# Patient Record
Sex: MALE | Race: WHITE | NOT HISPANIC OR LATINO | Employment: OTHER | URBAN - METROPOLITAN AREA
[De-identification: names, ages, dates, MRNs, and addresses within clinical notes are randomized per-mention and may not be internally consistent; named-entity substitution may affect disease eponyms.]

---

## 2017-02-04 ENCOUNTER — HOSPITAL ENCOUNTER (EMERGENCY)
Facility: HOSPITAL | Age: 47
Discharge: HOME | End: 2017-02-04
Attending: EMERGENCY MEDICINE
Payer: MEDICARE

## 2017-02-04 VITALS
SYSTOLIC BLOOD PRESSURE: 145 MMHG | BODY MASS INDEX: 23.91 KG/M2 | HEART RATE: 97 BPM | HEIGHT: 70 IN | WEIGHT: 167 LBS | TEMPERATURE: 99 F | OXYGEN SATURATION: 99 % | RESPIRATION RATE: 18 BRPM | DIASTOLIC BLOOD PRESSURE: 84 MMHG

## 2017-02-04 DIAGNOSIS — R06.02 SHORTNESS OF BREATH: Primary | ICD-10-CM

## 2017-02-04 DIAGNOSIS — R05.9 COUGH: ICD-10-CM

## 2017-02-04 LAB
ALBUMIN SERPL BCP-MCNC: 4.3 G/DL
ALP SERPL-CCNC: 92 U/L
ALT SERPL W/O P-5'-P-CCNC: 56 U/L
ANION GAP SERPL CALC-SCNC: 11 MMOL/L
AST SERPL-CCNC: 37 U/L
BASOPHILS # BLD AUTO: 0.03 K/UL
BASOPHILS NFR BLD: 0.3 %
BILIRUB SERPL-MCNC: 0.5 MG/DL
BILIRUB UR QL STRIP: NEGATIVE
BUN SERPL-MCNC: 14 MG/DL
CALCIUM SERPL-MCNC: 9.7 MG/DL
CHLORIDE SERPL-SCNC: 106 MMOL/L
CLARITY UR REFRACT.AUTO: CLEAR
CO2 SERPL-SCNC: 21 MMOL/L
COLOR UR AUTO: YELLOW
CREAT SERPL-MCNC: 0.9 MG/DL
DIFFERENTIAL METHOD: ABNORMAL
EOSINOPHIL # BLD AUTO: 0.3 K/UL
EOSINOPHIL NFR BLD: 3.1 %
ERYTHROCYTE [DISTWIDTH] IN BLOOD BY AUTOMATED COUNT: 13 %
EST. GFR  (AFRICAN AMERICAN): >60 ML/MIN/1.73 M^2
EST. GFR  (NON AFRICAN AMERICAN): >60 ML/MIN/1.73 M^2
GLUCOSE SERPL-MCNC: 154 MG/DL
GLUCOSE UR QL STRIP: NEGATIVE
HCT VFR BLD AUTO: 42 %
HGB BLD-MCNC: 13.6 G/DL
HGB UR QL STRIP: NEGATIVE
KETONES UR QL STRIP: NEGATIVE
LEUKOCYTE ESTERASE UR QL STRIP: NEGATIVE
LIPASE SERPL-CCNC: 64 U/L
LYMPHOCYTES # BLD AUTO: 1.4 K/UL
LYMPHOCYTES NFR BLD: 14.9 %
MCH RBC QN AUTO: 29.6 PG
MCHC RBC AUTO-ENTMCNC: 32.4 %
MCV RBC AUTO: 91 FL
MONOCYTES # BLD AUTO: 0.5 K/UL
MONOCYTES NFR BLD: 5.7 %
NEUTROPHILS # BLD AUTO: 6.9 K/UL
NEUTROPHILS NFR BLD: 75.7 %
NITRITE UR QL STRIP: NEGATIVE
PH UR STRIP: 5 [PH] (ref 5–8)
PLATELET # BLD AUTO: 257 K/UL
PMV BLD AUTO: 10.7 FL
POTASSIUM SERPL-SCNC: 4.6 MMOL/L
PROT SERPL-MCNC: 8.1 G/DL
PROT UR QL STRIP: NEGATIVE
RBC # BLD AUTO: 4.6 M/UL
SODIUM SERPL-SCNC: 138 MMOL/L
SP GR UR STRIP: 1.01 (ref 1–1.03)
URN SPEC COLLECT METH UR: NORMAL
UROBILINOGEN UR STRIP-ACNC: NEGATIVE EU/DL
WBC # BLD AUTO: 9.07 K/UL

## 2017-02-04 PROCEDURE — 80053 COMPREHEN METABOLIC PANEL: CPT

## 2017-02-04 PROCEDURE — 93010 ELECTROCARDIOGRAM REPORT: CPT | Mod: ,,, | Performed by: INTERNAL MEDICINE

## 2017-02-04 PROCEDURE — 85025 COMPLETE CBC W/AUTO DIFF WBC: CPT

## 2017-02-04 PROCEDURE — 99284 EMERGENCY DEPT VISIT MOD MDM: CPT | Mod: 25

## 2017-02-04 PROCEDURE — 93005 ELECTROCARDIOGRAM TRACING: CPT

## 2017-02-04 PROCEDURE — 81003 URINALYSIS AUTO W/O SCOPE: CPT

## 2017-02-04 PROCEDURE — 99285 EMERGENCY DEPT VISIT HI MDM: CPT | Mod: GC,,, | Performed by: EMERGENCY MEDICINE

## 2017-02-04 PROCEDURE — 83690 ASSAY OF LIPASE: CPT

## 2017-02-04 NOTE — ED TRIAGE NOTES
Pt to ed from [facility] with caretaker who states she noted sob and pt holding l side, pt is non-verbal @ baseline

## 2017-02-04 NOTE — ED AVS SNAPSHOT
[facility]  [address] [address]               Bernard Olvera   2017  9:59 AM   ED    Description:  Male : 1970   Department:  [facility]           Your Care was Coordinated By:     Provider Role From To    Nehemias Graves MD Attending Provider 17 1002 --    Elisabeth Kim MD Resident 17 1001 --      Reason for Visit     Shortness of Breath           Diagnoses this Visit        Comments    Shortness of breath    -  Primary     Cough           ED Disposition     ED Disposition Condition Comment    Discharge             To Do List           Follow-up Information     Call Elaina Patterson MD.    Specialty:  Internal Medicine    Contact information:    [address]  [place] [address]  [phone]          Follow up with [facility].    Specialty:  Emergency Medicine    Why:  ASAP for new or worsening symptoms    Contact information:    [address] [facility] [id number] [phone]      [facility]     [facility] Care [facility] -  Assistance  Registered nurses in the [facility] provide clinical advisement, health education, appointment booking, and other advisory services.  Call for this free service at [phone].             Medications           Message regarding Medications     Verify the changes and/or additions to your medication regime listed below are the same as discussed with your clinician today.  If any of these changes or additions are incorrect, please notify your healthcare provider.             Verify that the below list of medications is an accurate representation of the medications you are currently taking.  If none reported, the list may be blank. If incorrect, please contact your healthcare provider. Carry this list with you in case of emergency.           Current Medications     atorvastatin (LIPITOR) 10 MG tablet  "Take 1 tablet by mouth once daily.    diazepam (VALIUM) 10 MG Tab     hydrOXYzine HCl (ATARAX) 25 MG tablet Take 1 tablet (25 mg total) by mouth every 6 (six) hours as needed for Itching or Anxiety.    INTUNIV ER 3 mg Tb24 Take 1 tablet by mouth every morning.    ketoconazole (NIZORAL) 2 % shampoo Apply topically twice a week.    lamotrigine (LAMICTAL) 100 MG tablet Take 1 tablet (100 mg total) by mouth 2 (two) times daily.    levothyroxine (SYNTHROID) 50 MCG tablet Take 50 mcg by mouth Daily. 1 Tablet Oral Every day    lorazepam (ATIVAN) 1 MG tablet 1 tablet po every 6 hours as needed for agitation; may give with Seroquel 50 mg prn    metformin (GLUCOPHAGE) 500 MG tablet 500 mg twice daily    omega-3 fatty acids-vitamin E (FISH OIL) 1,000 mg Cap     paliperidone (INVEGA) 9 MG TR24 TAKE 1 TABLET BY MOUTH daily at 8am    propranolol (INDERAL LA) 60 MG 24 hr capsule Take 1 capsule by mouth once daily.    quetiapine (SEROQUEL) 200 MG Tab Take 1 tablet (200 mg total) by mouth nightly.    quetiapine (SEROQUEL) 50 MG tablet Take 1 tablet (50 mg total) by mouth 3 (three) times daily.    quetiapine (SEROQUEL) 50 MG tablet Take 1 tablet (50 mg total) by mouth every 6 (six) hours as needed.    ranitidine (ZANTAC) 300 MG tablet Take 300 mg by mouth once daily.     triamcinolone acetonide 0.1% (KENALOG) 0.1 % cream Apply topically 2 (two) times daily.    vitamin D 185 MG Tab Take 1 tablet by mouth Daily.           Clinical Reference Information           Your Vitals Were     BP Pulse Temp Resp Height Weight    151/88 (BP Location: Right arm, Patient Position: Sitting) 90 98.9 °F (37.2 °C) (Oral) 16 5' 10" (1.778 m) 75.8 kg (167 lb)    SpO2 BMI             99% 23.96 kg/m2         Allergies as of 2/4/2017     No Known Allergies      Immunizations Administered on Date of Encounter - 2/4/2017     None      ED Micro, Lab, POCT     Start Ordered       Status Ordering Provider    02/04/17 1016 02/04/17 1016  CBC auto differential  " STAT      Final result     02/04/17 1016 02/04/17 1016  Comprehensive metabolic panel  STAT      Final result     02/04/17 1016 02/04/17 1016  Lipase  STAT      Final result     02/04/17 1016 02/04/17 1016  Urinalysis  STAT      Final result       ED Imaging Orders     Start Ordered       Status Ordering Provider    02/04/17 1016 02/04/17 1016  X-Ray Chest PA And Lateral  1 time imaging      Final result       Discharge References/Attachments     SHORTNESS OF BREATH (DYSPNEA) (ENGLISH)       [facility] complies with applicable Federal civil rights laws and does not discriminate on the basis of race, color, national origin, age, disability, or sex.        Language Assistance Services     ATTENTION: Language assistance services are available, free of charge. Please call [phone].      ATENCIÓN: Si habla sebas, tiene a palafox disposición servicios gratuitos de asistencia lingüística. Llame al [phone].     CHÚ Ý: N?u b?n nói Ti?ng Vi?t, có các d?ch v? h? tr? ngôn ng? mi?n phí dành cho b?n. G?i s? [phone].

## 2017-02-04 NOTE — ED PROVIDER NOTES
Encounter Date: 2/4/2017    SCRIBE #1 NOTE: I, Christiano Ann, am scribing for, and in the presence of,  Dr. Graves. I have scribed the following portions of the note - the Resident attestation. Other sections scribed: CXR.       History     Chief Complaint   Patient presents with    Shortness of Breath     Review of patient's allergies indicates:  No Known Allergies  HPI Comments: 46 y.o. M with severe developmental delay, seizure disorder, HLD who presents with caregiver for respiratory difficulty. Patient presents today with his caregiver, who has known Bernard for five years. She was concerned this morning because he appeared to be breathing heavier and noisier than usual. He was also holding his left lower anterior chest wall. When asked to point to his pain, pt grabs his mid abdomen and then his genital area. No further information obtained. Per his caregiver, he now looks back to his baseline but she is concerned as she has never seen these symptoms before. Pt nonverbal and unable to provide any further history.     The history is provided by a caregiver. The history is limited by a developmental delay.     Past Medical History   Diagnosis Date    Behavioral problem     Glucose intolerance (impaired glucose tolerance) 11/20/2013    History of psychiatric care     Hyperlipidemia 11/28/2012    Hypothyroidism 11/28/2012    Insulin resistance 3/27/2014    Liver disease      fatty infiltration, prior Hepatitis B exposure    Mental retardation     Moderate mental retardation 11/28/2012    Personal history of seizure disorder     Psychiatric problem     Seizure disorder 11/28/2012    Self-injurious behavior     Therapy      Past Medical History Pertinent Negatives   Diagnosis Date Noted    Yasmeen 7/21/2012    Suicide attempt 7/21/2012     History reviewed. No pertinent past surgical history.  History reviewed. No pertinent family history.  Social History   Substance Use Topics    Smoking status: Never  Smoker    Smokeless tobacco: Never Used    Alcohol use No     Review of Systems   Unable to perform ROS: Patient nonverbal       Physical Exam   Initial Vitals   BP Pulse Resp Temp SpO2   02/04/17 0957 02/04/17 0957 02/04/17 0957 02/04/17 0957 02/04/17 0957   151/88 90 16 98.9 °F (37.2 °C) 99 %     Physical Exam    Nursing note and vitals reviewed.  Constitutional: He appears well-developed and well-nourished. He is not diaphoretic. No distress.   HENT:   Head: Normocephalic and atraumatic.   Eyes: Conjunctivae and EOM are normal.   Neck: Normal range of motion. Neck supple.   Cardiovascular: Normal rate, regular rhythm, normal heart sounds and intact distal pulses. Exam reveals no friction rub.    No murmur heard.  Pulmonary/Chest: No respiratory distress. He has no wheezes. He has no rhonchi. He has no rales.   Normal RR, not in respiratory distress   Abdominal: Soft. Bowel sounds are normal. He exhibits no distension. There is no tenderness. There is no rebound and no guarding.   Genitourinary:   Genitourinary Comments: Normal male genitalia without tenderness, redness, or swelling. No testicular masses or tenderness palpated. Caregiver present to chaperone  exam   Musculoskeletal: Normal range of motion. He exhibits no edema.   Neurological: He is alert. He has normal strength.   Skin: Skin is warm and dry. No rash noted.         ED Course   Procedures  Labs Reviewed   CBC W/ AUTO DIFFERENTIAL - Abnormal; Notable for the following:        Result Value    Hemoglobin 13.6 (*)     Gran% 75.7 (*)     Lymph% 14.9 (*)     All other components within normal limits   COMPREHENSIVE METABOLIC PANEL - Abnormal; Notable for the following:     CO2 21 (*)     Glucose 154 (*)     ALT 56 (*)     All other components within normal limits   LIPASE - Abnormal; Notable for the following:     Lipase 64 (*)     All other components within normal limits   URINALYSIS     EKG Readings: (Independently Interpreted)   Initial Reading:  No STEMI. Rhythm: Normal Sinus Rhythm. Ectopy: No Ectopy. Conduction: Normal.   NSR at 92       X-Rays:   Independently Interpreted Readings:   Chest X-Ray: No acute processes.            APC / Resident Notes:   46 y.o. Nonverbal M brought in by caregiver for episode of breathing difficulty, now resolved.  Afebrile, normal vitals here, and pt very well appearing on exam.  Ddx pneumonia, early viral syndrome, UTI. Abdomen soft and nontender on my exam, making acute abdominal pathology such as pancreatitis, colitis, appendicitis, cholecystitis unlikely.  Plan for labs, lipase, UA, EKG and CXR.   Dispo pending.  Elisabeth Kim MD    Labs reviewed and reassuring - no leukocytosis, normal lytes,UA not infected. CXR without any suspicious findings concerning for PNA. EKG NSR without acute ischemic changes .Pt continues to be well appearing without any obvious respiratory difficulty.  Results explained to Bernard's caregiver, and return precautions for recurrent or worsening symptoms were discussed. Discharged in stable condition.  Elisabeth Kim MD  11:48 AM         Scribe Attestation:   Scribe #1: I performed the above scribed service and the documentation accurately describes the services I performed. I attest to the accuracy of the note.    Attending Attestation:   Physician Attestation Statement for Resident:  As the supervising MD   Physician Attestation Statement: I have personally seen and examined this patient.   I agree with the above history. -: 45 yo male presents with brief episodic shortness of breath. Caregiver describes symptoms as very short periods of heaving breathing which lasts less than a minute and resolved spontaneously. No persistence of these symptoms. I reviewed his labs, imaging, and EKG and all are unremarkable.    As the supervising MD I agree with the above PE.   -: Non-toxic, well-appearing, and asymptomatic on reassessment.    As the supervising MD I agree with the above treatment, course, plan,  and disposition.   -: I feel the patient is stable for discharge.          Physician Attestation for Scribe:  Physician Attestation Statement for Scribe #1: I, Dr. Graves, reviewed documentation, as scribed by Christiano Ann in my presence, and it is both accurate and complete.                 ED Course     Clinical Impression:   The primary encounter diagnosis was Shortness of breath. A diagnosis of Cough was also pertinent to this visit.          Elisabeth Kim MD  Resident  02/04/17 1141

## 2017-02-04 NOTE — ED NOTES
Two patient identifiers checked and confirmed.    APPEARANCE: Resting comfortably in no acute distress. Patient has clean hair, skin and nails. Clothing is appropriate and properly fastened.  NEURO: Pt is nonverbal @ baseline per caretaker, pt is alert to baseline, able to follow instructions  HEENT: Head symmetrical. Bilateral eyes without redness or drainage.  CARDIAC: Regular rate and rhythm.  RESPIRATORY: Airway is open and patent. Respirations are spontaneous on room air. Normal respiratory effort and rate noted.  GI/: Abdomen soft and non-distended. Patient is reported to void and stool appropriately for age.  NEUROVASCULAR: All extremities are warm and pink with +2 pulses and capillary refill less than 3 seconds.  MUSCULOSKELETAL: Moves all extremities well; no obvious deformities noted.  SKIN: Warm and dry, adequate turgor, mucus membranes moist and pink

## 2017-06-06 DIAGNOSIS — F29 PSYCHOSIS, UNSPECIFIED PSYCHOSIS TYPE: ICD-10-CM

## 2017-06-06 DIAGNOSIS — F63.9 IMPULSE CONTROL DISORDER: ICD-10-CM

## 2017-06-06 RX ORDER — LAMOTRIGINE 100 MG/1
TABLET ORAL
Qty: 60 TABLET | Refills: 0 | Status: SHIPPED | OUTPATIENT
Start: 2017-06-06 | End: 2017-06-13 | Stop reason: SDUPTHER

## 2017-06-13 ENCOUNTER — OFFICE VISIT (OUTPATIENT)
Dept: PSYCHIATRY | Facility: CLINIC | Age: 47
End: 2017-06-13
Payer: MEDICARE

## 2017-06-13 VITALS
DIASTOLIC BLOOD PRESSURE: 83 MMHG | SYSTOLIC BLOOD PRESSURE: 123 MMHG | WEIGHT: 159.81 LBS | BODY MASS INDEX: 22.88 KG/M2 | HEIGHT: 70 IN | HEART RATE: 84 BPM

## 2017-06-13 DIAGNOSIS — F29 PSYCHOSIS, UNSPECIFIED PSYCHOSIS TYPE: Primary | ICD-10-CM

## 2017-06-13 DIAGNOSIS — R74.8 ELEVATED LIPASE: ICD-10-CM

## 2017-06-13 DIAGNOSIS — F41.1 ANXIETY STATE, UNSPECIFIED: ICD-10-CM

## 2017-06-13 DIAGNOSIS — F63.9 IMPULSE CONTROL DISORDER: ICD-10-CM

## 2017-06-13 DIAGNOSIS — R25.1 TREMOR: ICD-10-CM

## 2017-06-13 DIAGNOSIS — Z79.899 MEDICATION MANAGEMENT: ICD-10-CM

## 2017-06-13 PROCEDURE — 99213 OFFICE O/P EST LOW 20 MIN: CPT | Mod: PBBFAC | Performed by: PSYCHIATRY & NEUROLOGY

## 2017-06-13 PROCEDURE — 99214 OFFICE O/P EST MOD 30 MIN: CPT | Mod: S$PBB,,, | Performed by: PSYCHIATRY & NEUROLOGY

## 2017-06-13 PROCEDURE — 99999 PR PBB SHADOW E&M-EST. PATIENT-LVL III: CPT | Mod: PBBFAC,,, | Performed by: PSYCHIATRY & NEUROLOGY

## 2017-06-13 RX ORDER — QUETIAPINE FUMARATE 25 MG/1
50 TABLET, FILM COATED ORAL EVERY 6 HOURS PRN
Qty: 60 TABLET | Refills: 6 | Status: SHIPPED | OUTPATIENT
Start: 2017-06-13 | End: 2018-03-08 | Stop reason: SDUPTHER

## 2017-06-13 RX ORDER — PALIPERIDONE 9 MG/1
TABLET, EXTENDED RELEASE ORAL
Qty: 30 TABLET | Refills: 6 | Status: SHIPPED | OUTPATIENT
Start: 2017-06-13 | End: 2018-01-02 | Stop reason: SDUPTHER

## 2017-06-13 RX ORDER — QUETIAPINE FUMARATE 200 MG/1
200 TABLET, FILM COATED ORAL NIGHTLY
Qty: 30 TABLET | Refills: 6 | Status: SHIPPED | OUTPATIENT
Start: 2017-06-13 | End: 2018-01-02 | Stop reason: SDUPTHER

## 2017-06-13 RX ORDER — HYDROXYZINE HYDROCHLORIDE 25 MG/1
25 TABLET, FILM COATED ORAL EVERY 6 HOURS PRN
Qty: 120 TABLET | Refills: 6 | Status: SHIPPED | OUTPATIENT
Start: 2017-06-13 | End: 2018-03-08 | Stop reason: SDUPTHER

## 2017-06-13 RX ORDER — LORAZEPAM 1 MG/1
TABLET ORAL
Qty: 60 TABLET | Refills: 5 | Status: SHIPPED | OUTPATIENT
Start: 2017-06-13 | End: 2018-03-08 | Stop reason: SDUPTHER

## 2017-06-13 RX ORDER — LAMOTRIGINE 100 MG/1
TABLET ORAL
Qty: 60 TABLET | Refills: 6 | Status: SHIPPED | OUTPATIENT
Start: 2017-06-13 | End: 2018-01-03 | Stop reason: SDUPTHER

## 2017-06-13 RX ORDER — PROPRANOLOL HYDROCHLORIDE 60 MG/1
60 CAPSULE, EXTENDED RELEASE ORAL DAILY
Qty: 30 CAPSULE | Refills: 6 | Status: SHIPPED | OUTPATIENT
Start: 2017-06-13 | End: 2018-01-02 | Stop reason: SDUPTHER

## 2017-06-13 RX ORDER — QUETIAPINE FUMARATE 50 MG/1
50 TABLET, FILM COATED ORAL 3 TIMES DAILY
Qty: 90 TABLET | Refills: 6 | Status: SHIPPED | OUTPATIENT
Start: 2017-06-13 | End: 2018-01-02 | Stop reason: SDUPTHER

## 2017-06-13 RX ORDER — GUANFACINE 3 MG/1
1 TABLET, EXTENDED RELEASE ORAL EVERY MORNING
Qty: 30 TABLET | Refills: 6 | Status: SHIPPED | OUTPATIENT
Start: 2017-06-13 | End: 2018-01-02 | Stop reason: SDUPTHER

## 2017-06-13 NOTE — PATIENT INSTRUCTIONS
Continue all current medications with refills as noted.  Get fasting labs done as ordered: CMP, Lipid panel, lipase, amylase  Return to clinic in 6 months for follow up.

## 2017-06-13 NOTE — PROGRESS NOTES
"Outpatient Psychiatry Follow-Up Visit (MD/NP)   06/13/2017    Clinical Status of Patient: Outpatient (Ambulatory)     Session Length:  30 minutes (E&M)      Chief Complaint: Bernard Olvera is a 46 y.o. male who presents today for follow-up of impulsivity, irritibility, mental retardation.   Met with patient and RP (Benrard Gregorio), as well as Medhat (care manager).     Interval History and Content of Current Session:   General impression: First appointment with me since 12/22/2016.  Resident of [email or web address].  History of interim events is obtained from [facility], as pt. cannot communicate verbally -- see below.     Target symptoms: Impulsivity, irritibility, voracious appetite, mental retardation.     Interim events:   His  note pt has been doing well overall.  Mr. Gregorio states he has not needed a prn in past 4 months.   He has NOT displayed anger or acting out behaviors.      Behaviors have been under fairly good control.  He has been able to wait in waiting rooms much better lately.  He still tends to wander.    Sleep -- generally sleeps well.  He has been sleeping through the night most nights.  They keep him very structured during the day, as he is more likely to act out when he is bored.    He has an occasional verbal outburst.  He has his own apt.  Rarely, he will lose his temper, bang on the walls.  Most of the time if he acts out, it is more attention seeking behaviors.    "He gets excited, will start banging around, slap side of his head, he's all smiles".      Mr. Gregorio states MCS will be getting out of the supportive independent living (pt lives by self and has an assigned care manager who stays with him).      Appetite is good.  He has 1:1 supervision with meals, as he will still impulsively will snatch food items out of person's possession.  He has grabbed things out of other person's grocery carts or will take things off the shelf.    Staff try to keep only fresh fruits and " vegetables or frozen or other foods that must be cooked first, rather than ready to eat foods, available.   He tends to be more disinhibited when around his family.    Meds appear to be working well overall to control behaviors -- see below.       Still tends to get bored and will start doing repetitious behaviors.  Still tends to pace lot at times (self stimulation).  Some of these behaviors are definitely meant to obtain attention or to provoke a response in the other person.    Still tends to pick at his skin areas frequently;  they keep his fingernails short.  However, less slapping/hitting self; can hit self hard, or head butt objects.     He does not have a roommate, so he does not have anyone to bother.      Will tend to misbehave, but is generally redirectable.  Tends to do better with men rather than women, especially one with a firm presence.   He is not aware of any type of danger.  They must keep a close eye on him to prevent him from running out into the street or taking someone's food at any locale at any time.  They have become much more coordinated in ensuring his diet is properly maintained, despite his tendency to binge.       He is calmer and less impulsive today; for the first time ever, he did NOT go in my bathroom in my office even once during session to urinate.            Review of Systems   · PSYCHIATRIC: Pertinant items are noted in the narrative.  · CONSTITUTIONAL: No recent significant weight changes.   · MUSCULOSKELETAL: No pain or stiffness of the joints.  · NEUROLOGIC: No weakness, sensory changes, seizures, tremor or other abnormal movements.  · ENDOCRINE: No polydipsia or polyuria.  · INTEGUMENTARY: no rash, no lacerations.  · EYES: No exophthalmos, jaundice or blindness.  · ENT: No dizziness, tinnitus or hearing loss.  · RESPIRATORY: No shortness of breath.  · CARDIOVASCULAR: No tachycardia or chest pain.  · GASTROINTESTINAL: No nausea, vomiting, pain, constipation or  diarrhea.  · GENITOURINARY: No frequency, dysuria.       Current Medications:      Current Outpatient Prescriptions:     atorvastatin (LIPITOR) 10 MG tablet, Take 1 tablet by mouth once daily., Disp: , Rfl:     diazepam (VALIUM) 10 MG Tab, , Disp: , Rfl:     guanfacine (INTUNIV ER) 3 mg Tb24, Take 3 mg by mouth every morning., Disp: 30 tablet, Rfl: 6    hydrOXYzine HCl (ATARAX) 25 MG tablet, Take 1 tablet (25 mg total) by mouth every 6 (six) hours as needed for Itching or Anxiety., Disp: 120 tablet, Rfl: 6    ketoconazole (NIZORAL) 2 % shampoo, Apply topically twice a week., Disp: 240 mL, Rfl: 6    lamotrigine (LAMICTAL) 100 MG tablet, TAKE 1 TABLET BY MOUTH twice a day (8am/8pm), Disp: 60 tablet, Rfl: 6    levothyroxine (SYNTHROID) 50 MCG tablet, Take 50 mcg by mouth Daily. 1 Tablet Oral Every day, Disp: , Rfl:     lorazepam (ATIVAN) 1 MG tablet, 1 tablet po every 6 hours as needed for agitation; may give with Seroquel 50 mg prn, Disp: 60 tablet, Rfl: 5    metformin (GLUCOPHAGE) 500 MG tablet, 500 mg twice daily, Disp: 180 tablet, Rfl: 3    omega-3 fatty acids-vitamin E (FISH OIL) 1,000 mg Cap, , Disp: , Rfl:     paliperidone (INVEGA) 9 MG TR24, TAKE 1 TABLET BY MOUTH daily at 8am, Disp: 30 tablet, Rfl: 6    propranolol (INDERAL LA) 60 MG 24 hr capsule, Take 1 capsule (60 mg total) by mouth once daily., Disp: 30 capsule, Rfl: 6    quetiapine (SEROQUEL) 200 MG Tab, Take 1 tablet (200 mg total) by mouth nightly., Disp: 30 tablet, Rfl: 6    quetiapine (SEROQUEL) 25 MG Tab, Take 2 tablets (50 mg total) by mouth every 6 (six) hours as needed., Disp: 60 tablet, Rfl: 6    quetiapine (SEROQUEL) 50 MG tablet, Take 1 tablet (50 mg total) by mouth 3 (three) times daily., Disp: 90 tablet, Rfl: 6    ranitidine (ZANTAC) 300 MG tablet, Take 300 mg by mouth once daily. , Disp: , Rfl:     vitamin D 185 MG Tab, Take 1 tablet by mouth Daily., Disp: , Rfl:        Compliance: Yes     Side effects: None     Risk  "Parameters:   Patient reports no suicidal ideation   Patient reports no homicidal ideation   Patient reports no self-injurious behavior   Patient reports no violent behavior     Patient's Response to Intervention:   The patient's response to intervention is accepting.     Progress Toward Goals and Other Mental Status Changes:   The patient's progress toward goals is limited.     Vitals: Most recent vital signs, dated today just prior to this appointment, were reviewed:     Vitals - 1 value per visit 10/3/2016 12/22/2016 2/4/2017 6/13/2017   SYSTOLIC 116 125 145 123   DIASTOLIC 71 74 84 83   PULSE 78 78 97 84   TEMPERATURE   98.9    RESPIRATIONS   18    SPO2   99    Weight (lb) 167.11 167 167 159.8   Weight (kg) 75.8 75.751 75.751 72.485   HEIGHT 5' 10" 5' 10" 5' 10" 5' 10"   BODY MASS INDEX 23.98 23.96 23.96 22.93   VISIT REPORT       Pain Score  0          Labs:    No visits with results within 2 Month(s) from this visit.   Latest known visit with results is:   Admission on 02/04/2017, Discharged on 02/04/2017   Component Date Value    WBC 02/04/2017 9.07     RBC 02/04/2017 4.60     Hemoglobin 02/04/2017 13.6*    Hematocrit 02/04/2017 42.0     MCV 02/04/2017 91     MCH 02/04/2017 29.6     MCHC 02/04/2017 32.4     RDW 02/04/2017 13.0     Platelets 02/04/2017 257     MPV 02/04/2017 10.7     Gran # 02/04/2017 6.9     Lymph # 02/04/2017 1.4     Mono # 02/04/2017 0.5     Eos # 02/04/2017 0.3     Baso # 02/04/2017 0.03     Gran% 02/04/2017 75.7*    Lymph% 02/04/2017 14.9*    Mono% 02/04/2017 5.7     Eosinophil% 02/04/2017 3.1     Basophil% 02/04/2017 0.3     Differential Method 02/04/2017 Automated     Sodium 02/04/2017 138     Potassium 02/04/2017 4.6     Chloride 02/04/2017 106     CO2 02/04/2017 21*    Glucose 02/04/2017 154*    BUN, Bld 02/04/2017 14     Creatinine 02/04/2017 0.9     Calcium 02/04/2017 9.7     Total Protein 02/04/2017 8.1     Albumin 02/04/2017 4.3     Total Bilirubin " 02/04/2017 0.5     Alkaline Phosphatase 02/04/2017 92     AST 02/04/2017 37     ALT 02/04/2017 56*    Anion Gap 02/04/2017 11     eGFR if African American 02/04/2017 >60.0     eGFR if non  Amer* 02/04/2017 >60.0     Lipase 02/04/2017 64*    Specimen UA 02/04/2017 Urine, Clean Catch     Color, UA 02/04/2017 Yellow     Appearance, UA 02/04/2017 Clear     pH, UA 02/04/2017 5.0     Specific Gravity, UA 02/04/2017 1.010     Protein, UA 02/04/2017 Negative     Glucose, UA 02/04/2017 Negative     Ketones, UA 02/04/2017 Negative     Bilirubin (UA) 02/04/2017 Negative     Occult Blood UA 02/04/2017 Negative     Nitrite, UA 02/04/2017 Negative     Urobilinogen, UA 02/04/2017 Negative     Leukocytes, UA 02/04/2017 Negative        Mental Status Evaluation      Appearance:  casually dressed, disheveled, overweight (abdominal obesity)    Behavior:  Not fully cooperative, some psychomotor agitation, restlessness and fidgety but redirectable, eye contact minimal, mostly verbally redirectable    Speech:  no intelligible speech; sometimes makes noises, grunts, moans    Mood:  Unable to assess; patient cannot verbally communicate    Affect:  Euthymic to mildly irritible at times, blunted, constricted   Thought Process:  Profound poverty of thought    Thought Content:  normal, no suicidality, no homicidality, delusions, or paranoia    Sensorium:  Unable to assess; patient cannot verbally communicate   Attention Span & Concentration  Poor    Cognition:  severely impaired    Insight:  very poor    Judgment:  very poor      AIMS Screening:   Facial and Oral Movements  Muscles of Facial Expression: Mild (blunting)  Lips and Perioral Area: None, normal (did not cooperate with finger to thumb tapping exercise)  Jaw: None, normal  Tongue: None, normal (did not stick tongue out of mouth; saw tongue only in his mouth)    Extremity Movements  Upper (arms, wrists, hands, fingers): None, normal (no cogwheel rigidity or  tremors noted in session; caregiver notes occasional resting tremor)  Lower (legs, knees, ankles, toes): None, normal (nonshuffling; does not  feet well while walking)    Trunk Movements  Neck, shoulders, hips: None, normal    Overall Severity  Severity of abnormal movements (highest score from questions above): 2  Incapacitation due to abnormal movements: None, normal  Patient's awareness of abnormal movements (rate only patient's report): No Awareness    Dental Status  Current problems with teeth and/or dentures?: No  Does patient usually wear dentures?: No     Impression:   Impulse-Control Disorder, unspecified  Intellectual Disability, profound  Unspecified Psychosis -- only for coding purposes for insurance coverage of pt's neuroleptic meds   Seizure Disorder   Hx of Fatty liver with elevated liver enzymes -- most recent labs were unremarkable.    Plan:   Medication management: Continue all current medications with refills as noted.   Limit afternoon naps to no more than 1 hour maximum (total).          Additional Notes:  Get fasting labs done as ordered: CMP, Lipid panel, lipase, amylase         Follow up with Dr. Elaina Patterson for medical issues.     Return to Clinic: 6 months, or sooner prn.    AIMS DONE.  NEXT AIMS IS DUE 12/'17.

## 2017-06-22 ENCOUNTER — LAB VISIT (OUTPATIENT)
Dept: LAB | Facility: HOSPITAL | Age: 47
End: 2017-06-22
Attending: INTERNAL MEDICINE
Payer: MEDICARE

## 2017-06-22 DIAGNOSIS — Z79.899 ENCOUNTER FOR LONG-TERM (CURRENT) USE OF OTHER MEDICATIONS: Primary | ICD-10-CM

## 2017-06-22 LAB
ALBUMIN SERPL BCP-MCNC: 4.1 G/DL
ALP SERPL-CCNC: 81 U/L
ALT SERPL W/O P-5'-P-CCNC: 31 U/L
AMYLASE SERPL-CCNC: 104 U/L
ANION GAP SERPL CALC-SCNC: 10 MMOL/L
AST SERPL-CCNC: 24 U/L
BILIRUB SERPL-MCNC: 0.7 MG/DL
BUN SERPL-MCNC: 14 MG/DL
CALCIUM SERPL-MCNC: 9.6 MG/DL
CHLORIDE SERPL-SCNC: 103 MMOL/L
CHOLEST/HDLC SERPL: 4.9 {RATIO}
CO2 SERPL-SCNC: 25 MMOL/L
CREAT SERPL-MCNC: 0.9 MG/DL
EST. GFR  (AFRICAN AMERICAN): >60 ML/MIN/1.73 M^2
EST. GFR  (NON AFRICAN AMERICAN): >60 ML/MIN/1.73 M^2
GLUCOSE SERPL-MCNC: 102 MG/DL
HDL/CHOLESTEROL RATIO: 20.5 %
HDLC SERPL-MCNC: 151 MG/DL
HDLC SERPL-MCNC: 31 MG/DL
LDLC SERPL CALC-MCNC: 92.8 MG/DL
LIPASE SERPL-CCNC: 44 U/L
NONHDLC SERPL-MCNC: 120 MG/DL
POTASSIUM SERPL-SCNC: 4.4 MMOL/L
PROT SERPL-MCNC: 7.3 G/DL
SODIUM SERPL-SCNC: 138 MMOL/L
TRIGL SERPL-MCNC: 136 MG/DL

## 2017-06-22 PROCEDURE — 82150 ASSAY OF AMYLASE: CPT

## 2017-06-22 PROCEDURE — 80061 LIPID PANEL: CPT

## 2017-06-22 PROCEDURE — 80053 COMPREHEN METABOLIC PANEL: CPT

## 2017-06-22 PROCEDURE — 36415 COLL VENOUS BLD VENIPUNCTURE: CPT

## 2017-06-22 PROCEDURE — 83690 ASSAY OF LIPASE: CPT

## 2017-08-30 ENCOUNTER — HOSPITAL ENCOUNTER (EMERGENCY)
Facility: HOSPITAL | Age: 47
Discharge: HOME | End: 2017-08-31
Attending: EMERGENCY MEDICINE
Payer: MEDICARE

## 2017-08-30 VITALS
RESPIRATION RATE: 18 BRPM | WEIGHT: 170 LBS | TEMPERATURE: 96 F | OXYGEN SATURATION: 98 % | SYSTOLIC BLOOD PRESSURE: 146 MMHG | HEART RATE: 74 BPM | DIASTOLIC BLOOD PRESSURE: 82 MMHG | BODY MASS INDEX: 23.03 KG/M2 | HEIGHT: 72 IN

## 2017-08-30 DIAGNOSIS — R11.11 NON-INTRACTABLE VOMITING WITHOUT NAUSEA, UNSPECIFIED VOMITING TYPE: Primary | ICD-10-CM

## 2017-08-30 PROCEDURE — 83690 ASSAY OF LIPASE: CPT

## 2017-08-30 PROCEDURE — 85025 COMPLETE CBC W/AUTO DIFF WBC: CPT

## 2017-08-30 PROCEDURE — 99284 EMERGENCY DEPT VISIT MOD MDM: CPT | Mod: ,,, | Performed by: EMERGENCY MEDICINE

## 2017-08-30 PROCEDURE — 80053 COMPREHEN METABOLIC PANEL: CPT

## 2017-08-30 PROCEDURE — 96372 THER/PROPH/DIAG INJ SC/IM: CPT

## 2017-08-30 PROCEDURE — 63600175 PHARM REV CODE 636 W HCPCS: Performed by: STUDENT IN AN ORGANIZED HEALTH CARE EDUCATION/TRAINING PROGRAM

## 2017-08-30 PROCEDURE — 84100 ASSAY OF PHOSPHORUS: CPT

## 2017-08-30 PROCEDURE — 83735 ASSAY OF MAGNESIUM: CPT

## 2017-08-30 PROCEDURE — 99284 EMERGENCY DEPT VISIT MOD MDM: CPT | Mod: 25

## 2017-08-30 RX ORDER — HALOPERIDOL 5 MG/ML
5 INJECTION INTRAMUSCULAR
Status: COMPLETED | OUTPATIENT
Start: 2017-08-30 | End: 2017-08-30

## 2017-08-30 RX ADMIN — HALOPERIDOL LACTATE 5 MG: 5 INJECTION, SOLUTION INTRAMUSCULAR at 11:08

## 2017-08-31 LAB
ALBUMIN SERPL BCP-MCNC: 4.1 G/DL
ALP SERPL-CCNC: 93 U/L
ALT SERPL W/O P-5'-P-CCNC: 48 U/L
ANION GAP SERPL CALC-SCNC: 14 MMOL/L
AST SERPL-CCNC: 38 U/L
BASOPHILS # BLD AUTO: 0.03 K/UL
BASOPHILS NFR BLD: 0.3 %
BILIRUB SERPL-MCNC: 0.5 MG/DL
BUN SERPL-MCNC: 12 MG/DL
CALCIUM SERPL-MCNC: 9.6 MG/DL
CHLORIDE SERPL-SCNC: 102 MMOL/L
CO2 SERPL-SCNC: 24 MMOL/L
CREAT SERPL-MCNC: 0.9 MG/DL
DIFFERENTIAL METHOD: ABNORMAL
EOSINOPHIL # BLD AUTO: 0.2 K/UL
EOSINOPHIL NFR BLD: 2 %
ERYTHROCYTE [DISTWIDTH] IN BLOOD BY AUTOMATED COUNT: 12.1 %
EST. GFR  (AFRICAN AMERICAN): >60 ML/MIN/1.73 M^2
EST. GFR  (NON AFRICAN AMERICAN): >60 ML/MIN/1.73 M^2
GLUCOSE SERPL-MCNC: 135 MG/DL
HCT VFR BLD AUTO: 37.3 %
HGB BLD-MCNC: 12.7 G/DL
LIPASE SERPL-CCNC: 41 U/L
LYMPHOCYTES # BLD AUTO: 1.2 K/UL
LYMPHOCYTES NFR BLD: 11 %
MAGNESIUM SERPL-MCNC: 1.9 MG/DL
MCH RBC QN AUTO: 29.3 PG
MCHC RBC AUTO-ENTMCNC: 34 G/DL
MCV RBC AUTO: 86 FL
MONOCYTES # BLD AUTO: 0.6 K/UL
MONOCYTES NFR BLD: 5 %
NEUTROPHILS # BLD AUTO: 9.2 K/UL
NEUTROPHILS NFR BLD: 81.3 %
PHOSPHATE SERPL-MCNC: 3.7 MG/DL
PLATELET # BLD AUTO: 215 K/UL
PMV BLD AUTO: 9.5 FL
POTASSIUM SERPL-SCNC: 4.2 MMOL/L
PROT SERPL-MCNC: 7.5 G/DL
RBC # BLD AUTO: 4.33 M/UL
SODIUM SERPL-SCNC: 140 MMOL/L
WBC # BLD AUTO: 11.28 K/UL

## 2017-08-31 NOTE — ED TRIAGE NOTES
Pt started having emesis while at home tonight, and pointing to his stomach. Sitter reports he was putting his finger down his throat to induce vomiting. Pt is nonverbal and has hx of autism. Sitter reports he is at his baseline behavior at this time. Pt arrives with 20G PIV to left hand. Bilateral hands wrapped by EMS.

## 2017-08-31 NOTE — ED PROVIDER NOTES
Encounter Date: 8/30/2017       History     Chief Complaint   Patient presents with    Abdominal Pain     non verbal, holding stomach. EMS reports pt stuck finger down throat and vomited a couple times.     Denzel Wagner is a 47 y.o. male w/ complex psychiatric/behavior history including severe autism (non-verbal) that presents via EMS after vomiting episode.  His sitter was present and stated that he was lying in bed sleeping, then woke up and entered the room where he was sitting, pointing to his stomach as if in pain.  He then went to the bathroom and put his fingers in the back of his throat -> he vomited stomach contents/food.  He then went back to bed/lay down and within 5 minutes had another bout of emesis.      He is unable to replay to ROS questions, but his sitter said he has been relatively at baseline.  Had a normal day; normal well-formed stool; no f/c.  They are unsure about sick contacts but he does attend adult  during the day.          Review of patient's allergies indicates:  No Known Allergies  Past Medical History:   Diagnosis Date    Behavioral problem     Glucose intolerance (impaired glucose tolerance) 11/20/2013    History of psychiatric care     Hyperlipidemia 11/28/2012    Hypothyroidism 11/28/2012    Insulin resistance 3/27/2014    Liver disease     fatty infiltration, prior Hepatitis B exposure    Mental retardation     Moderate mental retardation 11/28/2012    Personal history of seizure disorder     Psychiatric problem     Seizure disorder 11/28/2012    Self-injurious behavior     Therapy      Past Surgical History:   Procedure Laterality Date    HERNIA REPAIR       No family history on file.  Social History   Substance Use Topics    Smoking status: Never Smoker    Smokeless tobacco: Never Used    Alcohol use No     Review of Systems   Unable to perform ROS: Psychiatric disorder       Physical Exam     Initial Vitals [08/30/17 2251]   BP Pulse Resp Temp  SpO2   (!) 146/82 74 18 96.2 °F (35.7 °C) 98 %      MAP       103.33         Physical Exam    Constitutional: He appears well-developed and well-nourished. He is not diaphoretic. No distress.   HENT:   Head: Normocephalic and atraumatic.   Eyes: EOM are normal. Pupils are equal, round, and reactive to light.   Neck: Normal range of motion. Neck supple.   Cardiovascular: Normal rate, regular rhythm and normal heart sounds.   Pulmonary/Chest: Breath sounds normal. No respiratory distress.   Abdominal: Soft. Bowel sounds are normal. He exhibits no distension. There is no tenderness. There is no rebound and no guarding.   Genitourinary: Penis normal. No discharge found.   Genitourinary Comments: Testicles descended.  Non-tender to palpation.  No masses/hernias observed or palpated.   Musculoskeletal: Normal range of motion. He exhibits no edema.   Neurological: He is alert. He has normal strength.   Skin: Skin is warm and dry. Capillary refill takes less than 2 seconds.   Psychiatric:   Pt w/ bandages over hands to prevent from pulling at IV line.  Becomes agitated, but not directly violent, after exam, pulling at IV line.    Non verbal.  No facial/non-verbal cues of understanding/communication.         ED Course   Procedures  Labs Reviewed   CBC W/ AUTO DIFFERENTIAL - Abnormal; Notable for the following:        Result Value    RBC 4.33 (*)     Hemoglobin 12.7 (*)     Hematocrit 37.3 (*)     Gran # 9.2 (*)     Gran% 81.3 (*)     Lymph% 11.0 (*)     All other components within normal limits   COMPREHENSIVE METABOLIC PANEL - Abnormal; Notable for the following:     Glucose 135 (*)     ALT 48 (*)     All other components within normal limits   LIPASE   MAGNESIUM   PHOSPHORUS                   APC / Resident Notes:   PGY-1 [facility]    Pt is a 47 y.o. male presenting with episode of emesis x 2 today.  No emesis in the Ed.  No reported f/c, diarrhea.  Abdominal exam is benign.    DDx includes but not limited to viral illness, food  poisoning, metaboilc/GI etiology.    Based on the clinical picture, I am most concerned for viral illness.    Based on my assessment, I will conduct work-up for Gi, metabolic etiologies.     Shaun Guevara MD  Emergency Medicine, PGY-1  11:19 PM 8/30/2017    PGY-1 UPDATE:    Patient reassessed and doing well overall.  He has been somewhat behavioral, pulling at his line; was given haldol 5 mg.  Required multiple family members and staff to sit back down.    Results obtained form workup revealed no acute abnormalities.  They were grossly normal.  No evidence of infection or hepatobiliary, renal cause.  Still with benign abdominal exam and has not gotten sick again in the Ed.      Based on this, we will discharge home to his caregivers who supervise him.  I discussed return precautions with his sister and she verbalized understanding.    Shaun Guevara MD  Emergency Medicine, PGY-1  12:38 AM 8/31/2017             Attending Attestation:   Physician Attestation Statement for Resident:  As the supervising MD   Physician Attestation Statement: I have personally seen and examined this patient.   I agree with the above history. -: 48 yo m, h/o MR, s/p episodes n/v this evening at home, no diarrhea, rubbing epigastric area.  On exam, VSS, well-appearing, abd soft/nt/nd.  Unclear etiology of n/v, ddx would include AGE vs SBO.  Surgical pathology seems less likely as sx have resolved PTA and abd nontender.  Tolerated PO in the ED.  Labs unremarkable.  Family very interested in going home without further work-up.  Given return precautions.  Will d/c home   As the supervising MD I agree with the above PE.    As the supervising MD I agree with the above treatment, course, plan, and disposition.  I have reviewed and agree with the residents interpretation of the following: lab data.  I have reviewed the following: old records at this facility.                    ED Course     Clinical Impression:   The encounter diagnosis  was Non-intractable vomiting without nausea, unspecified vomiting type.                           Sangita Fulton MD  08/31/17 2969

## 2017-09-01 ENCOUNTER — TELEPHONE (OUTPATIENT)
Dept: INTERNAL MEDICINE | Facility: CLINIC | Age: 47
End: 2017-09-01

## 2017-09-01 NOTE — TELEPHONE ENCOUNTER
----- Message from Karen Concepcion MA sent at 9/1/2017 12:30 PM CDT -----  Contact: Ana  - [phone]   Type: Orders Request    What orders/ testing are being requested? Labs     Is there a future appointment scheduled for the patient with PCP? Yes     When? 10/31/17    Comments: Please call. Thanks!

## 2017-09-03 NOTE — TELEPHONE ENCOUNTER
No need for blood work prior apt.  Will draw labs day of the visit if needed. He just had blood work

## 2017-09-08 ENCOUNTER — OFFICE VISIT (OUTPATIENT)
Dept: INTERNAL MEDICINE | Facility: CLINIC | Age: 47
End: 2017-09-08
Payer: MEDICARE

## 2017-09-08 VITALS
HEIGHT: 70 IN | OXYGEN SATURATION: 98 % | RESPIRATION RATE: 18 BRPM | BODY MASS INDEX: 23.14 KG/M2 | HEART RATE: 80 BPM | WEIGHT: 161.63 LBS | TEMPERATURE: 97 F | SYSTOLIC BLOOD PRESSURE: 106 MMHG | DIASTOLIC BLOOD PRESSURE: 70 MMHG

## 2017-09-08 DIAGNOSIS — K59.00 CONSTIPATION, UNSPECIFIED CONSTIPATION TYPE: Primary | ICD-10-CM

## 2017-09-08 PROCEDURE — 99999 PR PBB SHADOW E&M-EST. PATIENT-LVL III: CPT | Mod: PBBFAC,,, | Performed by: FAMILY MEDICINE

## 2017-09-08 PROCEDURE — 99213 OFFICE O/P EST LOW 20 MIN: CPT | Mod: PBBFAC,PO | Performed by: FAMILY MEDICINE

## 2017-09-08 PROCEDURE — 99213 OFFICE O/P EST LOW 20 MIN: CPT | Mod: S$PBB,,, | Performed by: FAMILY MEDICINE

## 2017-09-08 RX ORDER — DOCUSATE SODIUM 100 MG/1
100 CAPSULE, LIQUID FILLED ORAL 2 TIMES DAILY
Qty: 60 CAPSULE | Refills: 11 | Status: SHIPPED | OUTPATIENT
Start: 2017-09-08 | End: 2017-10-31 | Stop reason: SDUPTHER

## 2017-09-08 RX ORDER — POLYETHYLENE GLYCOL 3350 17 G/17G
17 POWDER, FOR SOLUTION ORAL DAILY
Qty: 510 G | Refills: 11 | Status: SHIPPED | OUTPATIENT
Start: 2017-09-08 | End: 2018-09-08

## 2017-09-08 NOTE — PROGRESS NOTES
Subjective:       Patient ID: Bernard Olvera is a 47 y.o. male.    Chief Complaint: Constipation    HPI 47-year-old white male who is nonverbal secondary to profound intellectual disability presents to clinic today with his nursing aide secondary to concerns of constipation with no documented bowel movement in approximately 7 days.  Of note the patient was seen in the emergency room on August 30, 2017 secondary to complaints of abdominal pain with vomiting.  At that time workup was negative.  Since that time the nursing aide denies that the patient has complained of any symptoms such as abdominal pain or nausea.  The patient has maintained an adequate appetite and has continued to pass gas but she reports no known bowel movement.  The patient does hydrate but predominantly drinks Olegario-Aid, Hawaiian Punch, and apple juice.  No other medications have been given such as stool softeners or laxatives.  Review of Systems   Constitutional: Negative for appetite change, chills, fatigue and fever.   HENT: Negative for congestion, ear pain, hearing loss, postnasal drip, rhinorrhea, sinus pressure, sore throat and tinnitus.    Eyes: Negative for redness, itching and visual disturbance.   Respiratory: Negative for cough, chest tightness and shortness of breath.    Cardiovascular: Negative for chest pain and palpitations.   Gastrointestinal: Positive for constipation. Negative for abdominal pain, diarrhea, nausea and vomiting.   Genitourinary: Negative for decreased urine volume, difficulty urinating, dysuria, frequency, hematuria and urgency.   Musculoskeletal: Negative for back pain, myalgias, neck pain and neck stiffness.   Skin: Negative for rash.   Neurological: Negative for dizziness, light-headedness and headaches.   Psychiatric/Behavioral: Negative.        Objective:      Physical Exam   Constitutional: He is oriented to person, place, and time. He appears well-developed and well-nourished. No distress.   HENT:    Head: Normocephalic and atraumatic.   Right Ear: External ear normal.   Left Ear: External ear normal.   Nose: Nose normal.   Mouth/Throat: Oropharynx is clear and moist. No oropharyngeal exudate.   Eyes: Conjunctivae and EOM are normal. Pupils are equal, round, and reactive to light. Right eye exhibits no discharge. Left eye exhibits no discharge. No scleral icterus.   Neck: Normal range of motion. Neck supple. No JVD present. No tracheal deviation present. No thyromegaly present.   Cardiovascular: Normal rate, regular rhythm, normal heart sounds and intact distal pulses.  Exam reveals no gallop and no friction rub.    No murmur heard.  Pulmonary/Chest: Effort normal and breath sounds normal. No stridor. No respiratory distress. He has no wheezes. He has no rales.   Abdominal: Soft. Bowel sounds are normal. He exhibits no distension and no mass. There is no tenderness. There is no rebound and no guarding.   Musculoskeletal: Normal range of motion. He exhibits no edema or tenderness.   Lymphadenopathy:     He has no cervical adenopathy.   Neurological: He is alert and oriented to person, place, and time.   Patient is nonverbal   Skin: Skin is warm and dry. No rash noted. He is not diaphoretic. No erythema. No pallor.   Psychiatric: He has a normal mood and affect. His behavior is normal. Judgment and thought content normal.   Nursing note and vitals reviewed.      Assessment:       1. Constipation, unspecified constipation type        Plan:     Constipation, unspecified constipation type    Other orders  -     docusate sodium (COLACE) 100 MG capsule; Take 1 capsule (100 mg total) by mouth 2 (two) times daily.  Dispense: 60 capsule; Refill: 11  -     polyethylene glycol (GLYCOLAX) 17 gram/dose powder; Take 17 g by mouth once daily.  Dispense: 510 g; Refill: 11      Encourage hydration.  Return to clinic as needed if symptoms persist or worsen.

## 2017-09-10 ENCOUNTER — HOSPITAL ENCOUNTER (EMERGENCY)
Facility: HOSPITAL | Age: 47
Discharge: HOME | End: 2017-09-10
Attending: EMERGENCY MEDICINE
Payer: MEDICARE

## 2017-09-10 VITALS
RESPIRATION RATE: 16 BRPM | SYSTOLIC BLOOD PRESSURE: 129 MMHG | OXYGEN SATURATION: 99 % | DIASTOLIC BLOOD PRESSURE: 79 MMHG | WEIGHT: 161 LBS | BODY MASS INDEX: 23.05 KG/M2 | HEIGHT: 70 IN | HEART RATE: 85 BPM | TEMPERATURE: 98 F

## 2017-09-10 DIAGNOSIS — K59.00 CONSTIPATION, UNSPECIFIED CONSTIPATION TYPE: Primary | ICD-10-CM

## 2017-09-10 PROCEDURE — 99284 EMERGENCY DEPT VISIT MOD MDM: CPT | Mod: ,,, | Performed by: EMERGENCY MEDICINE

## 2017-09-10 PROCEDURE — 99283 EMERGENCY DEPT VISIT LOW MDM: CPT

## 2017-09-10 RX ORDER — SENNOSIDES 8.6 MG/1
2 TABLET ORAL DAILY
Qty: 60 TABLET | Refills: 0 | Status: SHIPPED | OUTPATIENT
Start: 2017-09-10 | End: 2017-10-09 | Stop reason: SDUPTHER

## 2017-09-10 NOTE — ED NOTES
Pt's ilana states that he had a BM after moving to bed 19. She described it as thick and paste like.

## 2017-09-10 NOTE — ED TRIAGE NOTES
Profound MR male who is non verbal with complaint of no BM in 10 days per caregiver.  Patient alble to pass gas without any problem.    GENERAL: The patient is well-developed and well-nourished in no apparent distress. Alert and oriented to person and place.                                                HEENT: Head is normocephalic and atraumatic. Extraocular muscles are intact. Pupils are equal, round, and reactive to light and accommodation. Nares appeared normal. Mouth is well hydrated and without lesions. Mucous membranes are moist. Posterior pharynx clear of any exudate or lesions.    NECK: Supple. No carotid bruits. No lymphadenopathy or thyromegaly.    LUNGS: Clear to auscultation.    HEART: Regular rate and rhythm without murmur.     ABDOMEN: Soft, nontender, and nondistended. Hypoactive BS noted all quadrants.  No hepatosplenomegaly was noted.     EXTREMITIES: Without any cyanosis, clubbing, rash, lesions or edema.     NEUROLOGIC: Cranial nerves II through XII are grossly intact.     PSYCHIATRIC: Flat affect, but denies suicidal or homicidal ideations.    SKIN: No ulceration or induration present.

## 2017-09-10 NOTE — ED NOTES
Patient had BM in bathroom.  Caregiver is not sure how large because patient flushed the toilet right away.

## 2017-09-11 RX ORDER — KETOCONAZOLE 20 MG/ML
SHAMPOO, SUSPENSION TOPICAL
Qty: 240 ML | Refills: 4 | Status: SHIPPED | OUTPATIENT
Start: 2017-09-11 | End: 2017-10-31 | Stop reason: SDUPTHER

## 2017-10-31 ENCOUNTER — OFFICE VISIT (OUTPATIENT)
Dept: INTERNAL MEDICINE | Facility: CLINIC | Age: 47
End: 2017-10-31
Payer: MEDICARE

## 2017-10-31 VITALS
BODY MASS INDEX: 23.11 KG/M2 | SYSTOLIC BLOOD PRESSURE: 115 MMHG | HEART RATE: 91 BPM | WEIGHT: 161.06 LBS | DIASTOLIC BLOOD PRESSURE: 81 MMHG

## 2017-10-31 DIAGNOSIS — E55.9 VITAMIN D DEFICIENCY DISEASE: ICD-10-CM

## 2017-10-31 DIAGNOSIS — F63.9 IMPULSE CONTROL DISORDER: ICD-10-CM

## 2017-10-31 DIAGNOSIS — E03.8 OTHER SPECIFIED HYPOTHYROIDISM: ICD-10-CM

## 2017-10-31 DIAGNOSIS — F73 PROFOUND INTELLECTUAL DISABILITY: ICD-10-CM

## 2017-10-31 DIAGNOSIS — R73.02 GLUCOSE INTOLERANCE (IMPAIRED GLUCOSE TOLERANCE): Primary | ICD-10-CM

## 2017-10-31 DIAGNOSIS — E78.49 OTHER HYPERLIPIDEMIA: ICD-10-CM

## 2017-10-31 DIAGNOSIS — G40.909 SEIZURE DISORDER: ICD-10-CM

## 2017-10-31 DIAGNOSIS — R74.8 ELEVATED LIVER ENZYMES: ICD-10-CM

## 2017-10-31 PROCEDURE — 99213 OFFICE O/P EST LOW 20 MIN: CPT | Mod: PBBFAC | Performed by: INTERNAL MEDICINE

## 2017-10-31 PROCEDURE — 99215 OFFICE O/P EST HI 40 MIN: CPT | Mod: S$PBB,,, | Performed by: INTERNAL MEDICINE

## 2017-10-31 PROCEDURE — 99999 PR PBB SHADOW E&M-EST. PATIENT-LVL III: CPT | Mod: PBBFAC,,, | Performed by: INTERNAL MEDICINE

## 2017-10-31 RX ORDER — CHOLECALCIFEROL (VITAMIN D3) 25 MCG
1000 TABLET ORAL DAILY
Qty: 30 TABLET | Refills: 6 | Status: SHIPPED | OUTPATIENT
Start: 2017-10-31 | End: 2018-06-28 | Stop reason: SDUPTHER

## 2017-10-31 RX ORDER — METFORMIN HYDROCHLORIDE 500 MG/1
TABLET ORAL
Qty: 60 TABLET | Refills: 6 | Status: SHIPPED | OUTPATIENT
Start: 2017-10-31 | End: 2018-11-02

## 2017-10-31 RX ORDER — KETOCONAZOLE 20 MG/ML
SHAMPOO, SUSPENSION TOPICAL
Qty: 240 ML | Refills: 4 | Status: SHIPPED | OUTPATIENT
Start: 2017-10-31 | End: 2018-10-31 | Stop reason: SDUPTHER

## 2017-10-31 RX ORDER — DIAZEPAM 10 MG/1
TABLET ORAL
Qty: 2 TABLET | Refills: 0 | Status: SHIPPED | OUTPATIENT
Start: 2017-10-31 | End: 2018-11-02

## 2017-10-31 RX ORDER — ATORVASTATIN CALCIUM 10 MG/1
10 TABLET, FILM COATED ORAL DAILY
Qty: 30 TABLET | Refills: 6 | Status: SHIPPED | OUTPATIENT
Start: 2017-10-31 | End: 2019-02-12

## 2017-10-31 RX ORDER — DOCUSATE SODIUM 100 MG/1
100 CAPSULE, LIQUID FILLED ORAL 2 TIMES DAILY
Qty: 60 CAPSULE | Refills: 11 | Status: SHIPPED | OUTPATIENT
Start: 2017-10-31 | End: 2018-10-31

## 2017-10-31 RX ORDER — LEVOTHYROXINE SODIUM 50 UG/1
50 TABLET ORAL
Qty: 30 TABLET | Refills: 6 | Status: SHIPPED | OUTPATIENT
Start: 2017-10-31 | End: 2018-06-28 | Stop reason: SDUPTHER

## 2017-10-31 NOTE — PROGRESS NOTES
CHIEF COMPLAINT: Follow up seizure disorder, hyperlipidemia, behavior disorder.     HISTORY OF PRESENT ILLNESS: 47-year-old man who presents with his direct care giver, Trish for follow up of above. He is not verbal; all history is obtained from his direct care staff. Bernard is now getting his services from [address]. HE still attends the day program at [facility]. Bernard has been doing well. Behavior is doing well on propranolol La 60 mg daily and Intuniv 3 mg daily. He rarely needs a prn medication for behavior. Mood is good. Sleep has been good. He is living in a quiet location which has helped his sleep. Mood has been happy. HE is currently taking Intuniv 3 mg daily, Propranolol LA 60 mg daily, Invega 9 mg in the morning, Seroquel 50 mg three times a day and Seroquel 200 mg at bedtime for his behavior disorder. He continues to see Dr Vides - last seen 6/2017 and due to see him again 12/2017.  He continues to take lamictal 100 mg twice daily for his seizure disorder - no seizures     Bernard needs constant supervision. He is redirected in his behavior. He will snatch food from others if left unsupervisied. He will eat until he throws up if left unsupervisied     He is on metformin 500 mg twice daily due to fatty liver and insulin resistance and lipitor 10 mg daily due to hyperlipidemia. Liver enzymes, blood sugar and cholesterol continue to gradually improve.    He is  on Synthroid 50 mcg daily for hypothryoidism. No apparent fatigue     He has a scaly rash on the left forearm. He tends to pick at that area.     He is on vitamin D 1000 units daily for vitamin D deficiency     He has recently had 2 ER visits for constipation. He is now taking miralax as needed and bowels have been regular.     PAST MEDICAL HISTORY:   1. Moderate to severe mental handicap.   2. Stereotypical movement disorder.   3. Impulse control disorder.   4. Seizure disorder.   5. Right undescended testis removed at age 1.   6.  Hyperlipidemia - off meds currently.   7. Transaminiitis  8. Hypothyroidism    SOCIAL HISTORY: He does not smoke, does not drink. He is in supported independent living through [employer].     MEDICATIONS and ALLERGIES: Updated on epic     Review of systems: no fever, chills, visual change, hearing issues, sinus congestion, sore throat, shortness of breath, chest pain, abdominal pain, nausea, voimting, constipation, diarrhea, heartburn, urinary frequency, nocturia, joint pain or muscle pain, rashes, headaches, excessive thrist    PHYSICAL EXAMINATION:     /81 (BP Location: Left arm, Patient Position: Sitting, BP Method: Medium (Manual))   Pulse 91   Wt 73.1 kg (161 lb 0.7 oz)   BMI 23.11 kg/m²     General: Alert, oriented. No apparent distress. Affect within normal   limits. Nonverbal.   Conjunctivae anicteric. Tympanic membranes clear. Oropharynx clear.   Neck supple.   Respiratory effort normal. Lungs clear to auscultation.   Heart: Regular rate and rhythm without murmurs, gallops or rubs.   No lower extremity edema.   Abdomen: Soft, nondistended, nontender. Bowel sounds present. No   hepatosplenomegaly.   No axillary lymphadenopathy.     - left testicle descended without masses. Right testicle absent. Penis without lesions  Feet - no erythema or warmth or caluses    ASSESSMENT AND PLAN:   1. Hyperlipidemia -on lipitor 10 mg daily.Lipids June 2017 acceptable  2. Transaminitis - stable  3. Glucose intolerance - on metformin. stable  3. Seizure disorder - stable on Lamictal.   4. Moderately mentally handicapped with impulse control disorder -follow up with Dr Vides 12/2017  5. Hypothyroidism - stable  6. Lichen planus vs psoriasis on arm - stable  7. Vitamin D deficiency - on replacement  8. Constipation - colace 100 mg twice dialy  I'll see him back in 4 months with labs prior, sooner if problems arise.

## 2017-12-27 ENCOUNTER — TELEPHONE (OUTPATIENT)
Dept: INTERNAL MEDICINE | Facility: CLINIC | Age: 47
End: 2017-12-27

## 2017-12-27 RX ORDER — PANTOPRAZOLE SODIUM 40 MG/1
40 TABLET, DELAYED RELEASE ORAL DAILY
Qty: 30 TABLET | Refills: 3 | Status: SHIPPED | OUTPATIENT
Start: 2017-12-27 | End: 2018-04-03 | Stop reason: SDUPTHER

## 2017-12-27 NOTE — TELEPHONE ENCOUNTER
Ana states that he is very particular. If the lunch is packed or if its the prepared food he will not eat it. Ana states that she can tell you what's going on. They try to cook what he likes however he is refusing what is made for him for breakfast and dinner. Ana was notified that he is a picky eater. Bernard will eat cereal and milk daily and be satisfied however she is aware that he can't have this. Pt prefers a hot meal, however there is no place to warm the food. Pt is also eating snacks as well. Ana thinks that what he is being offered isn't what he wants so he doesn't eat the 1 meal that they offer him.

## 2017-12-27 NOTE — TELEPHONE ENCOUNTER
Pt not in [facility] any longer but is int eh day program at [facility]. Day program is reporting that he is not eating lunch  Now cared for by [facility]    Please contact Eddiexiaochava  - [phone]     Let her know that he is not eating lunch int he day program at [facility]. She needs to get more information - what is going on in his apt?    Dr DUCKWORTH has added pantoprazole 40 mg daily incase he has a stomach issue. Rx sent to [facility]

## 2018-01-02 DIAGNOSIS — F29 PSYCHOSIS, UNSPECIFIED PSYCHOSIS TYPE: ICD-10-CM

## 2018-01-02 DIAGNOSIS — F63.9 IMPULSE CONTROL DISORDER: ICD-10-CM

## 2018-01-02 DIAGNOSIS — F41.1 ANXIETY STATE: ICD-10-CM

## 2018-01-02 DIAGNOSIS — R25.1 TREMOR: ICD-10-CM

## 2018-01-03 ENCOUNTER — TELEPHONE (OUTPATIENT)
Dept: INTERNAL MEDICINE | Facility: CLINIC | Age: 48
End: 2018-01-03

## 2018-01-03 DIAGNOSIS — F29 PSYCHOSIS, UNSPECIFIED PSYCHOSIS TYPE: ICD-10-CM

## 2018-01-03 DIAGNOSIS — F63.9 IMPULSE CONTROL DISORDER: ICD-10-CM

## 2018-01-03 RX ORDER — QUETIAPINE FUMARATE 50 MG/1
TABLET, FILM COATED ORAL
Qty: 90 TABLET | Refills: 3 | Status: SHIPPED | OUTPATIENT
Start: 2018-01-03 | End: 2018-03-08 | Stop reason: SDUPTHER

## 2018-01-03 RX ORDER — GUANFACINE 3 MG/1
TABLET, EXTENDED RELEASE ORAL
Qty: 30 TABLET | Refills: 3 | Status: SHIPPED | OUTPATIENT
Start: 2018-01-03 | End: 2018-03-08 | Stop reason: SDUPTHER

## 2018-01-03 RX ORDER — PROPRANOLOL HYDROCHLORIDE 60 MG/1
CAPSULE, EXTENDED RELEASE ORAL
Qty: 30 CAPSULE | Refills: 3 | Status: SHIPPED | OUTPATIENT
Start: 2018-01-03 | End: 2018-03-08 | Stop reason: SDUPTHER

## 2018-01-03 RX ORDER — QUETIAPINE FUMARATE 200 MG/1
TABLET, FILM COATED ORAL
Qty: 30 TABLET | Refills: 3 | Status: SHIPPED | OUTPATIENT
Start: 2018-01-03 | End: 2018-03-08 | Stop reason: SDUPTHER

## 2018-01-03 RX ORDER — PALIPERIDONE 9 MG/1
TABLET, EXTENDED RELEASE ORAL
Qty: 30 TABLET | Refills: 3 | Status: SHIPPED | OUTPATIENT
Start: 2018-01-03 | End: 2018-03-08 | Stop reason: SDUPTHER

## 2018-01-03 RX ORDER — LAMOTRIGINE 100 MG/1
TABLET ORAL
Qty: 60 TABLET | OUTPATIENT
Start: 2018-01-03

## 2018-01-03 NOTE — TELEPHONE ENCOUNTER
----- Message from Fely Dang sent at 1/3/2018 11:02 AM CST -----  Contact: Will/Jhon Rome/[phone] ph  Company is calling because there was a prescription put in for the medication pantoprazole (PROTONIX) 40 MG tablet and pharmacy wants to know if he should still be taking the medication ranitidine (ZANTAC) 300 MG tablet. Please call and advise.    Thank You

## 2018-01-05 RX ORDER — LAMOTRIGINE 100 MG/1
TABLET ORAL
Qty: 60 TABLET | Refills: 3 | Status: SHIPPED | OUTPATIENT
Start: 2018-01-05 | End: 2018-03-08 | Stop reason: SDUPTHER

## 2018-01-08 ENCOUNTER — HOSPITAL ENCOUNTER (EMERGENCY)
Facility: HOSPITAL | Age: 48
Discharge: HOME | End: 2018-01-08
Attending: EMERGENCY MEDICINE
Payer: MEDICARE

## 2018-01-08 ENCOUNTER — TELEPHONE (OUTPATIENT)
Dept: INTERNAL MEDICINE | Facility: CLINIC | Age: 48
End: 2018-01-08

## 2018-01-08 VITALS
OXYGEN SATURATION: 98 % | SYSTOLIC BLOOD PRESSURE: 138 MMHG | HEIGHT: 68 IN | TEMPERATURE: 97 F | WEIGHT: 153.88 LBS | RESPIRATION RATE: 18 BRPM | BODY MASS INDEX: 23.32 KG/M2 | HEART RATE: 84 BPM | DIASTOLIC BLOOD PRESSURE: 71 MMHG

## 2018-01-08 DIAGNOSIS — R82.90 MALODOROUS URINE: Primary | ICD-10-CM

## 2018-01-08 LAB
BILIRUB UR QL STRIP: NEGATIVE
CLARITY UR REFRACT.AUTO: CLEAR
COLOR UR AUTO: YELLOW
GLUCOSE UR QL STRIP: NEGATIVE
HGB UR QL STRIP: NEGATIVE
KETONES UR QL STRIP: NEGATIVE
LEUKOCYTE ESTERASE UR QL STRIP: NEGATIVE
NITRITE UR QL STRIP: NEGATIVE
PH UR STRIP: 5 [PH] (ref 5–8)
PROT UR QL STRIP: NEGATIVE
SP GR UR STRIP: 1.01 (ref 1–1.03)
URN SPEC COLLECT METH UR: NORMAL
UROBILINOGEN UR STRIP-ACNC: NEGATIVE EU/DL

## 2018-01-08 PROCEDURE — 99283 EMERGENCY DEPT VISIT LOW MDM: CPT

## 2018-01-08 PROCEDURE — 81003 URINALYSIS AUTO W/O SCOPE: CPT

## 2018-01-08 PROCEDURE — 99283 EMERGENCY DEPT VISIT LOW MDM: CPT | Mod: ,,, | Performed by: PHYSICIAN ASSISTANT

## 2018-01-08 NOTE — ED PROVIDER NOTES
Encounter Date: 1/8/2018    SCRIBE #1 NOTE: I, Deepthi Lopez, am scribing for, and in the presence of,  Dr. Morris. I have scribed the following portions of the note - the APC attestation.       History     Chief Complaint   Patient presents with    Male  Problem     blood in urine with smell per caregiver     Patient is a 47 year old male with PMHx of profound mental retardation, hypothyroidism, HLD, DM, GERD, and seizures. He presents to the ED for malodorous urine.  reports nighttime caregiver stating patient with malodorous urine with hematuria for two days. Patient has independent living with 24 hour care.       The history is provided by a caregiver. The history is limited by the condition of the patient. No  was used.     Review of patient's allergies indicates:  No Known Allergies  Past Medical History:   Diagnosis Date    Behavioral problem     Glucose intolerance (impaired glucose tolerance) 11/20/2013    History of psychiatric care     Hyperlipidemia 11/28/2012    Hypothyroidism 11/28/2012    Insulin resistance 3/27/2014    Liver disease     fatty infiltration, prior Hepatitis B exposure    Mental retardation     Moderate mental retardation 11/28/2012    Personal history of seizure disorder     Profound mental retardation in adult     Psychiatric problem     Seizure disorder 11/28/2012    Self-injurious behavior     Therapy      Past Surgical History:   Procedure Laterality Date    HERNIA REPAIR       Family History   Problem Relation Age of Onset    Family history unknown: Yes     Social History   Substance Use Topics    Smoking status: Never Smoker    Smokeless tobacco: Never Used    Alcohol use No     Review of Systems   Unable to perform ROS: Patient nonverbal       Physical Exam     Initial Vitals [01/08/18 1108]   BP Pulse Resp Temp SpO2   (!) 140/80 91 18 97.2 °F (36.2 °C) 99 %      MAP       100         Physical Exam    Vitals  reviewed.  Constitutional: He appears well-developed and well-nourished.   Patient resting comfortably in NAD on RA.   HENT:   Head: Normocephalic and atraumatic.   Nose: Nose normal.   Eyes: Conjunctivae and EOM are normal. Pupils are equal, round, and reactive to light.   Neck: Normal range of motion.   Cardiovascular: Normal rate and regular rhythm. Exam reveals no friction rub.    No murmur heard.  Pulmonary/Chest: Breath sounds normal. No respiratory distress. He has no wheezes. He has no rales.   Abdominal: Soft. Bowel sounds are normal. He exhibits no distension. There is no tenderness.   Genitourinary: Penis normal. Circumcised. No penile erythema or penile tenderness. No discharge found.   Genitourinary Comments: Cryptorchidism.    Musculoskeletal: Normal range of motion.   Neurological: He is alert and oriented to person, place, and time. He has normal strength. No sensory deficit.   Skin: Skin is warm and dry. No erythema.   Psychiatric: He has a normal mood and affect. Thought content normal.         ED Course   Procedures  Labs Reviewed   URINALYSIS, REFLEX TO URINE CULTURE    Narrative:     Preferred Collection Type->Urine, Clean Catch             Medical Decision Making:   History:   Old Medical Records: I decided to obtain old medical records.  Clinical Tests:   Lab Tests: Ordered and Reviewed       APC / Resident Notes:   Patient is a 47 year old male with PMHx of profound mental retardation, hypothyroidism, HLD, DM, GERD, and seizures. He presents to the ED for malodorous urine. Patient is in no acute distress. Vitals stable. AAOx3. RRR. Lungs CTA. Abdomen is soft, non tender, non distended. Skin is normal appearance without rashes.     Will order UA. Will continue to monitor.      Differential diagnoses include, but are not limited to: UTI.     UA unremarkable for infectious process. Will discharge home with F/U with PCP. Caregiver verbalizes understanding of plan and agrees. Return precautions  given.     I have discussed and reviewed with my supervising physician.           Scribe Attestation:   Scribe #1: I performed the above scribed service and the documentation accurately describes the services I performed. I attest to the accuracy of the note.    Attending Attestation:     Physician Attestation Statement for NP/PA:   I discussed this assessment and plan of this patient with the NP/PA, but I did not personally examine the patient. The face to face encounter was performed by the NP/PA.    Other NP/PA Attestation Additions:    History of Present Illness: Foul-smelling urine.                    ED Course      Clinical Impression:   The encounter diagnosis was Malodorous urine.    Disposition:   Disposition: Discharged  Condition: Stable                        Trish Kaiser PA-C  01/08/18 2200

## 2018-01-08 NOTE — ED TRIAGE NOTES
Presents to ER with complaint of dysuria and possible hematuria. Caregiver reports urine has a foul odor to it.  Patient is mentally retarded and non verbal.    GENERAL: The patient is a well-nourished male in no apparent distress. Alert and oriented to person and place.                                                HEENT: Head is normocephalic and atraumatic. Extraocular muscles are intact. Pupils are equal, round, and reactive to light and accommodation. Nares appeared normal. Mouth is well hydrated and without lesions. Mucous membranes are moist. Posterior pharynx clear of any exudate or lesions.     LUNGS: Clear to auscultation.    HEART: Regular rate and rhythm.    ABDOMEN: Soft, nontender, and nondistended. Positive bowel sounds. No hepatosplenomegaly was noted.     EXTREMITIES: Without any cyanosis, clubbing, rash, lesions or edema.     NEUROLOGIC: GCS 15     PSYCHIATRIC: Flat affect, but denies suicidal or homicidal ideations.    SKIN: No ulceration or induration present.

## 2018-01-08 NOTE — TELEPHONE ENCOUNTER
Pt has orange redish urine with a foul odor. Hattie would like to bring him to the er for this. She is on the way there now. X this morning.

## 2018-01-08 NOTE — TELEPHONE ENCOUNTER
----- Message from Pastora Russell sent at 1/8/2018  9:37 AM CST -----  Contact: [facility] [phone]  Kinjal would like to speak with the nurse regarding the pt has a odor with his urine and it is orange and red,please call

## 2018-01-08 NOTE — TELEPHONE ENCOUNTER
----- Message from Pastora Russell sent at 1/8/2018  9:37 AM CST -----  Contact: [facility] [phone]  Kinjal would like to speak with the nurse regarding the pt has a odor with his urine and it is orange and red,please call

## 2018-03-08 ENCOUNTER — OFFICE VISIT (OUTPATIENT)
Dept: PSYCHIATRY | Facility: CLINIC | Age: 48
End: 2018-03-08
Payer: MEDICARE

## 2018-03-08 VITALS
WEIGHT: 151.13 LBS | DIASTOLIC BLOOD PRESSURE: 74 MMHG | HEIGHT: 68 IN | SYSTOLIC BLOOD PRESSURE: 121 MMHG | BODY MASS INDEX: 22.9 KG/M2 | HEART RATE: 78 BPM

## 2018-03-08 DIAGNOSIS — R25.1 TREMOR: ICD-10-CM

## 2018-03-08 DIAGNOSIS — F73 PROFOUND INTELLECTUAL DISABILITY: ICD-10-CM

## 2018-03-08 DIAGNOSIS — G40.909 SEIZURE DISORDER: ICD-10-CM

## 2018-03-08 DIAGNOSIS — F29 PSYCHOSIS, UNSPECIFIED PSYCHOSIS TYPE: ICD-10-CM

## 2018-03-08 DIAGNOSIS — F41.1 ANXIETY STATE: ICD-10-CM

## 2018-03-08 DIAGNOSIS — F63.9 IMPULSE CONTROL DISORDER: Primary | ICD-10-CM

## 2018-03-08 PROCEDURE — 99999 PR PBB SHADOW E&M-EST. PATIENT-LVL III: CPT | Mod: PBBFAC,,, | Performed by: PSYCHIATRY & NEUROLOGY

## 2018-03-08 PROCEDURE — 99214 OFFICE O/P EST MOD 30 MIN: CPT | Mod: S$PBB,,, | Performed by: PSYCHIATRY & NEUROLOGY

## 2018-03-08 PROCEDURE — 99213 OFFICE O/P EST LOW 20 MIN: CPT | Mod: PBBFAC | Performed by: PSYCHIATRY & NEUROLOGY

## 2018-03-08 RX ORDER — PROPRANOLOL HYDROCHLORIDE 60 MG/1
CAPSULE, EXTENDED RELEASE ORAL
Qty: 30 CAPSULE | Refills: 6 | Status: SHIPPED | OUTPATIENT
Start: 2018-03-08 | End: 2018-10-08 | Stop reason: SDUPTHER

## 2018-03-08 RX ORDER — HYDROXYZINE HYDROCHLORIDE 25 MG/1
25 TABLET, FILM COATED ORAL EVERY 6 HOURS PRN
Qty: 120 TABLET | Refills: 6 | Status: SHIPPED | OUTPATIENT
Start: 2018-03-08 | End: 2019-11-11

## 2018-03-08 RX ORDER — QUETIAPINE FUMARATE 25 MG/1
50 TABLET, FILM COATED ORAL EVERY 6 HOURS PRN
Qty: 60 TABLET | Refills: 6 | Status: SHIPPED | OUTPATIENT
Start: 2018-03-08 | End: 2019-03-20

## 2018-03-08 RX ORDER — QUETIAPINE FUMARATE 200 MG/1
TABLET, FILM COATED ORAL
Qty: 30 TABLET | Refills: 6 | Status: SHIPPED | OUTPATIENT
Start: 2018-03-08 | End: 2018-10-08 | Stop reason: SDUPTHER

## 2018-03-08 RX ORDER — PALIPERIDONE 9 MG/1
TABLET, EXTENDED RELEASE ORAL
Qty: 30 TABLET | Refills: 6 | Status: SHIPPED | OUTPATIENT
Start: 2018-03-08 | End: 2019-03-20

## 2018-03-08 RX ORDER — GUANFACINE 3 MG/1
TABLET, EXTENDED RELEASE ORAL
Qty: 30 TABLET | Refills: 6 | Status: SHIPPED | OUTPATIENT
Start: 2018-03-08 | End: 2019-11-11

## 2018-03-08 RX ORDER — QUETIAPINE FUMARATE 50 MG/1
TABLET, FILM COATED ORAL
Qty: 90 TABLET | Refills: 6 | Status: SHIPPED | OUTPATIENT
Start: 2018-03-08 | End: 2018-10-08 | Stop reason: SDUPTHER

## 2018-03-08 RX ORDER — LORAZEPAM 1 MG/1
TABLET ORAL
Qty: 60 TABLET | Refills: 5 | OUTPATIENT
Start: 2018-03-08 | End: 2019-01-03

## 2018-03-08 RX ORDER — LAMOTRIGINE 100 MG/1
TABLET ORAL
Qty: 60 TABLET | Refills: 6 | Status: SHIPPED | OUTPATIENT
Start: 2018-03-08 | End: 2018-10-08 | Stop reason: SDUPTHER

## 2018-03-08 NOTE — PROGRESS NOTES
Outpatient Psychiatry Follow-Up Visit (MD/NP)   03/08/2018    Clinical Status of Patient: Outpatient (Ambulatory)     Session Length:  30 minutes (E&M)      Chief Complaint: Bernard Olvera is a 47 y.o. male who presents today for follow-up of impulsivity, irritibility, mental retardation.   Met with patient and RP (Trish).     Interval History and Content of Current Session:   General impression: First appointment with me since 06/13/2017.  Resident of [facility] ([facility]).  History of interim events is obtained from [facility], as pt. cannot communicate verbally -- see below.     Target symptoms: Impulsivity, irritibility, voracious appetite, mental retardation.     Interim events:   He has been doing fairly well overall.    He will have some periods when he will get agitated and will make grunting sounds and pull his fingers.    Once he dropped onto the floor and refused to get up.    He tends to act out more when new people come in to care for him.    Even then, he can generally be redirected and rarely needs a PRN med.   He has NOT displayed anger or acting out behaviors recently.      He has been able to wait in waiting rooms much better lately.  He still tends to wander.    Sleep -- generally sleeps well.  He has been sleeping through the night most nights.  They keep him very structured during the day, as he is more likely to act out when he is bored.    He has an occasional verbal outburst.  He has his own apt.  Rarely, he will lose his temper, bang on the walls.  Most of the time if he acts out, it is more attention seeking behaviors.    He is relatively calm in session today; he is still somewhat impulsive, ie, he did go urinate in the bathroom in my office.      He still goes to the day program at [place].  He stays active during the day.      His appetite is good.  He has 1:1 supervision with meals.  He must be reminded to chew his food, as he often swallows it unchewed, then will  choke and need to cough it up.    He has not been impulsively snatching food items out of person's possession.  In the store he had grabbed things out of another person's grocery cart or had taken things off the shelf -- these behaviors are much better lately.    Staff try to keep only fresh fruits and vegetables or frozen or other foods that must be cooked first, rather than ready to eat foods, available.   He tends to be more disinhibited when around his family.    Meds appear to be working well overall to control behaviors -- see below.       Still tends to get bored and will start doing repetitious behaviors.  Still tends to pace a lot at times (self stimulation).  Some of these behaviors are definitely meant to obtain attention or to provoke a response in the other person.    He still tends to pick at his skin areas frequently;  they keep his fingernails short.  However, less slapping/hitting self; can hit self hard, or head butt objects.     He does not have a roommate, so he does not have anyone to bother.      Will tend to misbehave, but is generally redirectable.  Tends to do better with men rather than women, especially one with a firm presence.        Review of Systems   · PSYCHIATRIC: Pertinant items are noted in the narrative.  · CONSTITUTIONAL: No recent significant weight changes.   · MUSCULOSKELETAL: No pain or stiffness of the joints.  · NEUROLOGIC: No weakness, sensory changes, seizures, tremor or other abnormal movements.  · ENDOCRINE: No polydipsia or polyuria.  · INTEGUMENTARY: no rash, no lacerations.  · EYES: No exophthalmos, jaundice or blindness.  · ENT: No dizziness, tinnitus or hearing loss.  · RESPIRATORY: No shortness of breath.  · CARDIOVASCULAR: No tachycardia or chest pain.  · GASTROINTESTINAL: No nausea, vomiting, pain, constipation or diarrhea.  · GENITOURINARY: No frequency, dysuria.       Current Medications:      Current Outpatient Prescriptions:     atorvastatin (LIPITOR) 10 MG  tablet, Take 1 tablet (10 mg total) by mouth once daily., Disp: 30 tablet, Rfl: 6    diazePAM (VALIUM) 10 MG Tab, One tablet One hour prior dental or doctor apt, Disp: 2 tablet, Rfl: 0    docusate sodium (COLACE) 100 MG capsule, Take 1 capsule (100 mg total) by mouth 2 (two) times daily., Disp: 60 capsule, Rfl: 11    guanFACINE 3 mg Tb24, TAKE 1 TABLET BY MOUTH every morning at 8am, Disp: 30 tablet, Rfl: 3    hydrOXYzine HCl (ATARAX) 25 MG tablet, Take 1 tablet (25 mg total) by mouth every 6 (six) hours as needed for Itching or Anxiety., Disp: 120 tablet, Rfl: 6    INVEGA 9 mg TR24, TAKE 1 TABLET BY MOUTH daily at 8am, Disp: 30 tablet, Rfl: 3    ketoconazole (NIZORAL) 2 % shampoo, USE TO WASH HAIR TWICE A WEEK AS DIRECTED, Disp: 240 mL, Rfl: 4    lamoTRIgine (LAMICTAL) 100 MG tablet, TAKE 1 TABLET BY MOUTH twice a day (8am/8pm), Disp: 60 tablet, Rfl: 3    levothyroxine (SYNTHROID) 50 MCG tablet, Take 1 tablet (50 mcg total) by mouth before breakfast. 1 Tablet Oral Every day, Disp: 30 tablet, Rfl: 6    lorazepam (ATIVAN) 1 MG tablet, 1 tablet po every 6 hours as needed for agitation; may give with Seroquel 50 mg prn, Disp: 60 tablet, Rfl: 5    metFORMIN (GLUCOPHAGE) 500 MG tablet, 500 mg twice daily, Disp: 60 tablet, Rfl: 6    omega-3 fatty acids-vitamin E 1,000 mg Cap, One tablet twic edaily, Disp: 60 each, Rfl: 11    pantoprazole (PROTONIX) 40 MG tablet, Take 1 tablet (40 mg total) by mouth once daily., Disp: 30 tablet, Rfl: 3    polyethylene glycol (GLYCOLAX) 17 gram/dose powder, Take 17 g by mouth once daily. (Patient taking differently: Take 17 g by mouth daily as needed. ), Disp: 510 g, Rfl: 11    propranolol (INDERAL LA) 60 MG 24 hr capsule, TAKE 1 CAPSULE BY MOUTH once daily at 8pm, Disp: 30 capsule, Rfl: 3    QUEtiapine (SEROQUEL) 200 MG Tab, TAKE 1 TABLET BY MOUTH Nightly at 8pm, Disp: 30 tablet, Rfl: 3    quetiapine (SEROQUEL) 25 MG Tab, Take 2 tablets (50 mg total) by mouth every 6 (six)  "hours as needed., Disp: 60 tablet, Rfl: 6    QUEtiapine (SEROQUEL) 50 MG tablet, TAKE 1 TABLET BY MOUTH three times a day (8am/noon/5pm), Disp: 90 tablet, Rfl: 3    ranitidine (ZANTAC) 300 MG tablet, Take 1 tablet (300 mg total) by mouth once daily., Disp: 30 tablet, Rfl: 6    vitamin D 1000 units Tab, Take 1 tablet (1,000 Units total) by mouth once daily., Disp: 30 tablet, Rfl: 6       Compliance: Yes     Side effects: None     Risk Parameters:   Patient reports no suicidal ideation   Patient reports no homicidal ideation   Patient reports no self-injurious behavior   Patient reports no violent behavior     Patient's Response to Intervention:   The patient's response to intervention is accepting.     Progress Toward Goals and Other Mental Status Changes:   The patient's progress toward goals is limited.     Vitals: Most recent vital signs, dated today just prior to this appointment, were reviewed:     Vitals - 1 value per visit 9/10/2017 10/31/2017 1/8/2018 3/8/2018   SYSTOLIC 129 115 138 121   DIASTOLIC 79 81 71 74   PULSE 85 91 84 78   TEMPERATURE 97.6  97.2    RESPIRATIONS 16  18    SPO2 99  98    Weight (lb) 161 161.05 153.88 151.13   Weight (kg) 73.029 73.05 69.8 68.55   HEIGHT 5' 10"  5' 8" 5' 8"   BODY MASS INDEX 23.1 23.11 23.4 22.98   VISIT REPORT       Pain Score   0         Labs:    Admission on 01/08/2018, Discharged on 01/08/2018   Component Date Value Ref Range Status    Specimen UA 01/08/2018 Urine, Clean Catch   Final    Color, UA 01/08/2018 Yellow  Yellow, Straw, Jennifer Final    Appearance, UA 01/08/2018 Clear  Clear Final    pH, UA 01/08/2018 5.0  5.0 - 8.0 Final    Specific Gravity, UA 01/08/2018 1.010  1.005 - 1.030 Final    Protein, UA 01/08/2018 Negative  Negative Final    Glucose, UA 01/08/2018 Negative  Negative Final    Ketones, UA 01/08/2018 Negative  Negative Final    Bilirubin (UA) 01/08/2018 Negative  Negative Final    Occult Blood UA 01/08/2018 Negative  Negative Final    " Nitrite, UA 01/08/2018 Negative  Negative Final    Urobilinogen, UA 01/08/2018 Negative  <2.0 EU/dL Final    Leukocytes, UA 01/08/2018 Negative  Negative Final   Admission on 08/30/2017, Discharged on 08/31/2017   Component Date Value Ref Range Status    WBC 08/30/2017 11.28  3.90 - 12.70 K/uL Final    RBC 08/30/2017 4.33* 4.60 - 6.20 M/uL Final    Hemoglobin 08/30/2017 12.7* 14.0 - 18.0 g/dL Final    Hematocrit 08/30/2017 37.3* 40.0 - 54.0 % Final    MCV 08/30/2017 86  82 - 98 fL Final    MCH 08/30/2017 29.3  27.0 - 31.0 pg Final    MCHC 08/30/2017 34.0  32.0 - 36.0 g/dL Final    RDW 08/30/2017 12.1  11.5 - 14.5 % Final    Platelets 08/30/2017 215  150 - 350 K/uL Final    MPV 08/30/2017 9.5  9.2 - 12.9 fL Final    Gran # (ANC) 08/30/2017 9.2* 1.8 - 7.7 K/uL Final    Lymph # 08/30/2017 1.2  1.0 - 4.8 K/uL Final    Mono # 08/30/2017 0.6  0.3 - 1.0 K/uL Final    Eos # 08/30/2017 0.2  0.0 - 0.5 K/uL Final    Baso # 08/30/2017 0.03  0.00 - 0.20 K/uL Final    Gran% 08/30/2017 81.3* 38.0 - 73.0 % Final    Lymph% 08/30/2017 11.0* 18.0 - 48.0 % Final    Mono% 08/30/2017 5.0  4.0 - 15.0 % Final    Eosinophil% 08/30/2017 2.0  0.0 - 8.0 % Final    Basophil% 08/30/2017 0.3  0.0 - 1.9 % Final    Differential Method 08/30/2017 Automated   Final    Sodium 08/30/2017 140  136 - 145 mmol/L Final    Potassium 08/30/2017 4.2  3.5 - 5.1 mmol/L Final    Chloride 08/30/2017 102  95 - 110 mmol/L Final    CO2 08/30/2017 24  23 - 29 mmol/L Final    Glucose 08/30/2017 135* 70 - 110 mg/dL Final    BUN, Bld 08/30/2017 12  6 - 20 mg/dL Final    Creatinine 08/30/2017 0.9  0.5 - 1.4 mg/dL Final    Calcium 08/30/2017 9.6  8.7 - 10.5 mg/dL Final    Total Protein 08/30/2017 7.5  6.0 - 8.4 g/dL Final    Albumin 08/30/2017 4.1  3.5 - 5.2 g/dL Final    Total Bilirubin 08/30/2017 0.5  0.1 - 1.0 mg/dL Final    Alkaline Phosphatase 08/30/2017 93  55 - 135 U/L Final    AST 08/30/2017 38  10 - 40 U/L Final    ALT  08/30/2017 48* 10 - 44 U/L Final    Anion Gap 08/30/2017 14  8 - 16 mmol/L Final    eGFR if African American 08/30/2017 >60.0  >60 mL/min/1.73 m^2 Final    eGFR if non African American 08/30/2017 >60.0  >60 mL/min/1.73 m^2 Final    Lipase 08/30/2017 41  4 - 60 U/L Final    Magnesium 08/30/2017 1.9  1.6 - 2.6 mg/dL Final    Phosphorus 08/30/2017 3.7  2.7 - 4.5 mg/dL Final         Mental Status Evaluation      Appearance:  casually dressed, disheveled, overweight (abdominal obesity)    Behavior:  Not fully cooperative, some psychomotor agitation, restlessness and fidgety but redirectable, eye contact minimal, mostly verbally redirectable    Speech:  no intelligible speech; sometimes makes noises, grunts, moans    Mood:  Unable to assess; patient cannot verbally communicate    Affect:  Euthymic to mildly irritible at times, blunted, constricted   Thought Process:  Profound poverty of thought    Thought Content:  normal, no suicidality, no homicidality, delusions, or paranoia    Sensorium:  Unable to assess; patient cannot verbally communicate   Attention Span & Concentration  Poor    Cognition:  severely impaired    Insight:  very poor    Judgment:  very poor      AIMS Screening:     Facial and Oral Movements  Muscles of Facial Expression: Mild (blunting)  Lips and Perioral Area: None, normal (not cooperative)  Jaw: None, normal  Tongue: None, normal (not cooperative)    Extremity Movements  Upper (arms, wrists, hands, fingers): None, normal (no cogwheel rigidity noted)  Lower (legs, knees, ankles, toes): None, normal (not shuffling; does not  feet well when walking)    Trunk Movements  Neck, shoulders, hips: None, normal    Overall Severity  Severity of abnormal movements (highest score from questions above): 2  Incapacitation due to abnormal movements: None, normal  Patient's awareness of abnormal movements (rate only patient's report):  (not verbal; unable to state)    Dental Status  Current problems  with teeth and/or dentures?: No  Does patient usually wear dentures?: No       Impression:   Impulse-Control Disorder, unspecified  Intellectual Disability, profound  Unspecified Psychosis -- only for coding purposes for insurance coverage of pt's neuroleptic meds   Seizure Disorder   Hx of Fatty liver with elevated liver enzymes -- most recent labs were unremarkable.    Plan:   Medication management: Continue all current medications with refills as noted.   Limit afternoon naps to no more than 1 hour maximum (total).          Additional Notes:  Get fasting labs done as ordered: CMP, Lipid panel, lipase, amylase         Follow up with Dr. Elaina Patterson for medical issues.     Return to Clinic: 6 months, or sooner prn.    AIMS DONE.  NEXT AIMS IS DUE 09/'18.

## 2018-03-08 NOTE — PATIENT INSTRUCTIONS
Continue all current medications with refills as noted.  Return to clinic in 6 months for follow up appt.

## 2018-04-03 RX ORDER — PANTOPRAZOLE SODIUM 40 MG/1
TABLET, DELAYED RELEASE ORAL
Qty: 30 TABLET | Refills: 1 | Status: SHIPPED | OUTPATIENT
Start: 2018-04-03 | End: 2018-06-05 | Stop reason: SDUPTHER

## 2018-05-14 ENCOUNTER — HOSPITAL ENCOUNTER (EMERGENCY)
Facility: HOSPITAL | Age: 48
Discharge: HOME | End: 2018-05-14
Attending: EMERGENCY MEDICINE
Payer: MEDICAID

## 2018-05-14 VITALS
SYSTOLIC BLOOD PRESSURE: 126 MMHG | DIASTOLIC BLOOD PRESSURE: 84 MMHG | BODY MASS INDEX: 21.97 KG/M2 | OXYGEN SATURATION: 98 % | TEMPERATURE: 98 F | RESPIRATION RATE: 17 BRPM | WEIGHT: 140 LBS | HEIGHT: 67 IN | HEART RATE: 76 BPM

## 2018-05-14 DIAGNOSIS — R10.9 ABDOMINAL PAIN: ICD-10-CM

## 2018-05-14 LAB
CTP QC/QA: YES
FECAL OCCULT BLOOD, POC: NEGATIVE
GLUCOSE SERPL-MCNC: 90 MG/DL (ref 70–110)
POCT GLUCOSE: 97 MG/DL (ref 70–110)

## 2018-05-14 PROCEDURE — 99284 EMERGENCY DEPT VISIT MOD MDM: CPT

## 2018-05-14 PROCEDURE — 25000003 PHARM REV CODE 250: Performed by: EMERGENCY MEDICINE

## 2018-05-14 RX ORDER — DICYCLOMINE HYDROCHLORIDE 20 MG/1
20 TABLET ORAL 2 TIMES DAILY
Qty: 30 TABLET | Refills: 0 | Status: SHIPPED | OUTPATIENT
Start: 2018-05-14 | End: 2018-05-17 | Stop reason: SDUPTHER

## 2018-05-14 RX ORDER — DICYCLOMINE HYDROCHLORIDE 10 MG/1
20 CAPSULE ORAL
Status: COMPLETED | OUTPATIENT
Start: 2018-05-14 | End: 2018-05-14

## 2018-05-14 RX ADMIN — DICYCLOMINE HYDROCHLORIDE 20 MG: 10 CAPSULE ORAL at 09:05

## 2018-05-14 NOTE — ED PROVIDER NOTES
Encounter Date: 5/14/2018       History     Chief Complaint   Patient presents with    Rectal Problem     Sitter reports pt c/o rectal pain x 2 days. Sitter also reports lack of appetite. pt is non-verbal but is cooperative     Chief complaint per caregiver:  Rectal pain  47-year-old with developmental delay brought in by his caregiver secondary to perceives rectal pain.  Patient has been pointing to his rectal area and appears to be uncomfortable.  He has had a decreased appetite.  No vomiting or diarrhea.  It is unknown when his last bowel movement was.  Patient is nonverbal      The history is provided by a caregiver.     Review of patient's allergies indicates:  No Known Allergies  Past Medical History:   Diagnosis Date    Behavioral problem     Glucose intolerance (impaired glucose tolerance) 11/20/2013    History of psychiatric care     Hyperlipidemia 11/28/2012    Hypothyroidism 11/28/2012    Insulin resistance 3/27/2014    Liver disease     fatty infiltration, prior Hepatitis B exposure    Mental retardation     Moderate mental retardation 11/28/2012    Personal history of seizure disorder     Profound mental retardation in adult     Psychiatric problem     Seizure disorder 11/28/2012    Self-injurious behavior     Therapy      Past Surgical History:   Procedure Laterality Date    HERNIA REPAIR       Family History   Problem Relation Age of Onset    Family history unknown: Yes     Social History   Substance Use Topics    Smoking status: Never Smoker    Smokeless tobacco: Never Used    Alcohol use No     Review of Systems   Unable to perform ROS: Patient nonverbal       Physical Exam     Initial Vitals [05/14/18 0857]   BP Pulse Resp Temp SpO2   126/84 76 17 98.1 °F (36.7 °C) 98 %      MAP       98         Physical Exam    Constitutional: He appears well-developed and well-nourished.   HENT:   Head: Normocephalic and atraumatic.   Eyes: EOM are normal. Pupils are equal, round, and  reactive to light.   Neck: Neck supple.   Cardiovascular: Normal rate, regular rhythm and normal heart sounds.   Pulmonary/Chest: Breath sounds normal.   Abdominal: Soft. There is no tenderness. There is no rebound and no guarding.   Genitourinary: Rectal exam shows guaiac negative stool. Guaiac negative stool.   Genitourinary Comments: No hemorrhoids, rectum appears normal   Musculoskeletal: Normal range of motion.   Neurological: He is alert. No cranial nerve deficit.   Skin: Skin is warm and dry.         ED Course   Procedures  Labs Reviewed   POCT GLUCOSE   POCT OCCULT BLOOD STOOL             Medical Decision Making:   Initial Assessment:   47-year-old brought in by his caregiver secondary to perceived rectal pain. On my exam patient is abdomen is soft.  There are no abnormality seen on rectal exam.  Stool is Hemoccult negative  ED Management:  Patient will be given Bentyl by mouth.  Abdominal x-ray will be done.  There is a large amount of gas seen on abdominal x-ray.  On repeat exam patient is smiling.  He is in no acute distress. He will be prescribed Bentyl and caregiver was instructed on a diet that is low and gas producing foods.  She was given strict return precautions.                      Clinical Impression:   The encounter diagnosis was Abdominal pain.                           Allie Schulte MD  05/14/18 4928

## 2018-05-14 NOTE — ED NOTES
Neuro: AAOx2  Resp: Airway patent, respirations even/unlabored  Cardiac: Skin pink/warm/dry, pulses intact  Abdomen: Soft, non-tender to palpation, denies N/V/D  Musculoskeletal: Moves all extremities equally, ROM intact  Rectal pain

## 2018-05-17 ENCOUNTER — OFFICE VISIT (OUTPATIENT)
Dept: INTERNAL MEDICINE | Facility: CLINIC | Age: 48
End: 2018-05-17
Payer: MEDICAID

## 2018-05-17 VITALS
HEART RATE: 83 BPM | BODY MASS INDEX: 23.36 KG/M2 | TEMPERATURE: 99 F | SYSTOLIC BLOOD PRESSURE: 98 MMHG | HEIGHT: 67 IN | OXYGEN SATURATION: 97 % | WEIGHT: 148.81 LBS | DIASTOLIC BLOOD PRESSURE: 66 MMHG

## 2018-05-17 DIAGNOSIS — K59.00 CONSTIPATION, UNSPECIFIED CONSTIPATION TYPE: ICD-10-CM

## 2018-05-17 DIAGNOSIS — Z09 HOSPITAL DISCHARGE FOLLOW-UP: Primary | ICD-10-CM

## 2018-05-17 PROCEDURE — 99213 OFFICE O/P EST LOW 20 MIN: CPT | Mod: S$PBB,,, | Performed by: NURSE PRACTITIONER

## 2018-05-17 PROCEDURE — 99999 PR PBB SHADOW E&M-EST. PATIENT-LVL V: CPT | Mod: PBBFAC,,, | Performed by: NURSE PRACTITIONER

## 2018-05-17 PROCEDURE — 99215 OFFICE O/P EST HI 40 MIN: CPT | Mod: PBBFAC | Performed by: NURSE PRACTITIONER

## 2018-05-17 RX ORDER — DICYCLOMINE HYDROCHLORIDE 20 MG/1
20 TABLET ORAL 2 TIMES DAILY
Qty: 60 TABLET | Refills: 1 | Status: SHIPPED | OUTPATIENT
Start: 2018-05-17 | End: 2018-06-28 | Stop reason: SDUPTHER

## 2018-06-05 RX ORDER — PANTOPRAZOLE SODIUM 40 MG/1
TABLET, DELAYED RELEASE ORAL
Qty: 30 TABLET | Refills: 0 | Status: SHIPPED | OUTPATIENT
Start: 2018-06-05 | End: 2018-06-28 | Stop reason: SDUPTHER

## 2018-06-28 ENCOUNTER — TELEPHONE (OUTPATIENT)
Dept: INTERNAL MEDICINE | Facility: CLINIC | Age: 48
End: 2018-06-28

## 2018-06-28 DIAGNOSIS — E03.9 HYPOTHYROIDISM, UNSPECIFIED TYPE: ICD-10-CM

## 2018-06-28 DIAGNOSIS — E53.8 VITAMIN B12 DEFICIENCY: ICD-10-CM

## 2018-06-28 DIAGNOSIS — R79.9 ABNORMAL BLOOD CHEMISTRY: Primary | ICD-10-CM

## 2018-06-28 DIAGNOSIS — Z12.5 SCREENING PSA (PROSTATE SPECIFIC ANTIGEN): ICD-10-CM

## 2018-06-28 DIAGNOSIS — E55.9 VITAMIN D DEFICIENCY DISEASE: ICD-10-CM

## 2018-06-28 RX ORDER — CHOLECALCIFEROL (VITAMIN D3) 25 MCG
TABLET ORAL
Qty: 30 TABLET | Refills: 2 | Status: SHIPPED | OUTPATIENT
Start: 2018-06-28 | End: 2023-09-24

## 2018-06-28 RX ORDER — DICYCLOMINE HYDROCHLORIDE 20 MG/1
TABLET ORAL
Qty: 60 TABLET | Refills: 1 | Status: SHIPPED | OUTPATIENT
Start: 2018-06-28 | End: 2018-10-08 | Stop reason: SDUPTHER

## 2018-06-28 RX ORDER — LEVOTHYROXINE SODIUM 50 UG/1
TABLET ORAL
Qty: 30 TABLET | Refills: 2 | Status: SHIPPED | OUTPATIENT
Start: 2018-06-28 | End: 2018-10-08 | Stop reason: SDUPTHER

## 2018-06-28 RX ORDER — METFORMIN HYDROCHLORIDE 500 MG/1
TABLET ORAL
Qty: 60 TABLET | Refills: 2 | Status: SHIPPED | OUTPATIENT
Start: 2018-06-28 | End: 2018-10-08 | Stop reason: SDUPTHER

## 2018-06-28 RX ORDER — PANTOPRAZOLE SODIUM 40 MG/1
TABLET, DELAYED RELEASE ORAL
Qty: 30 TABLET | Refills: 2 | Status: SHIPPED | OUTPATIENT
Start: 2018-06-28 | End: 2018-10-08 | Stop reason: SDUPTHER

## 2018-06-28 RX ORDER — ATORVASTATIN CALCIUM 10 MG/1
TABLET, FILM COATED ORAL
Qty: 30 TABLET | Refills: 2 | Status: SHIPPED | OUTPATIENT
Start: 2018-06-28 | End: 2018-10-03 | Stop reason: SDUPTHER

## 2018-06-28 NOTE — LETTER
July 2, 2018    Bernard Olvera  [address]  [place] [address]             [facility]  [address]  [place] [address]  Phone: [phone]  Fax: [phone] Dear Mr. Olvera:    You have not been seen in clinic since October 2017.    My office has been trying to reach you to schedule an appointment.    Please contact my office [phone] to schedule an appointment.    I will not be able to refill your medications if you are not seen in the next several months     Sincerely,        Elaina Patterson MD

## 2018-06-28 NOTE — TELEPHONE ENCOUNTER
Refilled pts meds  Have not seen since 10/2017  NO longer a amelia bettie Cabral  Pt needs an apt to be seen in follow up  - book in august    He needs fasting labs soon - next 1-2 weeks -    Call  to book apts

## 2018-09-11 ENCOUNTER — TELEPHONE (OUTPATIENT)
Dept: INTERNAL MEDICINE | Facility: CLINIC | Age: 48
End: 2018-09-11

## 2018-09-11 NOTE — TELEPHONE ENCOUNTER
----- Message from Fer Escobedo sent at 9/10/2018  3:06 PM CDT -----  Contact: Care Taker  11/02/18 Annual Physical need lab orders placed and linked  10/23/18 Labs  Thank you

## 2018-10-03 RX ORDER — ATORVASTATIN CALCIUM 10 MG/1
TABLET, FILM COATED ORAL
Qty: 30 TABLET | Refills: 11 | Status: SHIPPED | OUTPATIENT
Start: 2018-10-03 | End: 2018-11-02

## 2018-10-08 DIAGNOSIS — R25.1 TREMOR: ICD-10-CM

## 2018-10-08 DIAGNOSIS — F63.9 IMPULSE CONTROL DISORDER: ICD-10-CM

## 2018-10-08 DIAGNOSIS — F29 PSYCHOSIS, UNSPECIFIED PSYCHOSIS TYPE: ICD-10-CM

## 2018-10-08 RX ORDER — METFORMIN HYDROCHLORIDE 500 MG/1
TABLET ORAL
Qty: 180 TABLET | Refills: 3 | Status: SHIPPED | OUTPATIENT
Start: 2018-10-08 | End: 2018-11-02

## 2018-10-08 RX ORDER — QUETIAPINE FUMARATE 200 MG/1
TABLET, FILM COATED ORAL
Qty: 90 TABLET | Refills: 3 | Status: SHIPPED | OUTPATIENT
Start: 2018-10-08 | End: 2019-01-03 | Stop reason: SDUPTHER

## 2018-10-08 RX ORDER — PROPRANOLOL HYDROCHLORIDE 60 MG/1
CAPSULE, EXTENDED RELEASE ORAL
Qty: 90 CAPSULE | Refills: 3 | Status: SHIPPED | OUTPATIENT
Start: 2018-10-08 | End: 2019-11-11 | Stop reason: SDUPTHER

## 2018-10-08 RX ORDER — LAMOTRIGINE 100 MG/1
TABLET ORAL
Qty: 180 TABLET | Refills: 3 | Status: SHIPPED | OUTPATIENT
Start: 2018-10-08 | End: 2019-11-11 | Stop reason: SDUPTHER

## 2018-10-08 RX ORDER — LEVOTHYROXINE SODIUM 50 UG/1
TABLET ORAL
Qty: 90 TABLET | Refills: 3 | Status: SHIPPED | OUTPATIENT
Start: 2018-10-08 | End: 2019-11-05 | Stop reason: SDUPTHER

## 2018-10-08 RX ORDER — DICYCLOMINE HYDROCHLORIDE 20 MG/1
20 TABLET ORAL 2 TIMES DAILY
Qty: 180 TABLET | Refills: 3 | Status: SHIPPED | OUTPATIENT
Start: 2018-10-08 | End: 2018-11-02

## 2018-10-08 RX ORDER — QUETIAPINE FUMARATE 50 MG/1
TABLET, FILM COATED ORAL
Qty: 270 TABLET | Refills: 3 | Status: SHIPPED | OUTPATIENT
Start: 2018-10-08 | End: 2019-03-20 | Stop reason: SDUPTHER

## 2018-10-08 RX ORDER — PANTOPRAZOLE SODIUM 40 MG/1
TABLET, DELAYED RELEASE ORAL
Qty: 90 TABLET | Refills: 3 | Status: SHIPPED | OUTPATIENT
Start: 2018-10-08 | End: 2019-11-05 | Stop reason: SDUPTHER

## 2018-10-23 ENCOUNTER — TELEPHONE (OUTPATIENT)
Dept: ADMINISTRATIVE | Facility: OTHER | Age: 48
End: 2018-10-23

## 2018-10-23 ENCOUNTER — LAB VISIT (OUTPATIENT)
Dept: LAB | Facility: HOSPITAL | Age: 48
End: 2018-10-23
Attending: INTERNAL MEDICINE
Payer: MEDICAID

## 2018-10-23 DIAGNOSIS — E55.9 VITAMIN D DEFICIENCY DISEASE: ICD-10-CM

## 2018-10-23 DIAGNOSIS — R79.9 ABNORMAL BLOOD CHEMISTRY: ICD-10-CM

## 2018-10-23 DIAGNOSIS — E53.8 VITAMIN B12 DEFICIENCY: ICD-10-CM

## 2018-10-23 DIAGNOSIS — E03.9 HYPOTHYROIDISM, UNSPECIFIED TYPE: ICD-10-CM

## 2018-10-23 LAB
25(OH)D3+25(OH)D2 SERPL-MCNC: 47 NG/ML
ALBUMIN SERPL BCP-MCNC: 4.3 G/DL
ALP SERPL-CCNC: 109 U/L
ALT SERPL W/O P-5'-P-CCNC: 13 U/L
ANION GAP SERPL CALC-SCNC: 8 MMOL/L
AST SERPL-CCNC: 18 U/L
BASOPHILS # BLD AUTO: 0.04 K/UL
BASOPHILS NFR BLD: 0.7 %
BILIRUB SERPL-MCNC: 0.6 MG/DL
BUN SERPL-MCNC: 16 MG/DL
CALCIUM SERPL-MCNC: 9.8 MG/DL
CHLORIDE SERPL-SCNC: 103 MMOL/L
CHOLEST SERPL-MCNC: 169 MG/DL
CHOLEST/HDLC SERPL: 5 {RATIO}
CO2 SERPL-SCNC: 27 MMOL/L
CREAT SERPL-MCNC: 0.8 MG/DL
DIFFERENTIAL METHOD: ABNORMAL
EOSINOPHIL # BLD AUTO: 0.4 K/UL
EOSINOPHIL NFR BLD: 7.4 %
ERYTHROCYTE [DISTWIDTH] IN BLOOD BY AUTOMATED COUNT: 12.8 %
EST. GFR  (AFRICAN AMERICAN): >60 ML/MIN/1.73 M^2
EST. GFR  (NON AFRICAN AMERICAN): >60 ML/MIN/1.73 M^2
ESTIMATED AVG GLUCOSE: 100 MG/DL
GLUCOSE SERPL-MCNC: 100 MG/DL
HBA1C MFR BLD HPLC: 5.1 %
HCT VFR BLD AUTO: 40.5 %
HDLC SERPL-MCNC: 34 MG/DL
HDLC SERPL: 20.1 %
HGB BLD-MCNC: 12.6 G/DL
LDLC SERPL CALC-MCNC: 113 MG/DL
LYMPHOCYTES # BLD AUTO: 1.5 K/UL
LYMPHOCYTES NFR BLD: 24.7 %
MCH RBC QN AUTO: 27.3 PG
MCHC RBC AUTO-ENTMCNC: 31.1 G/DL
MCV RBC AUTO: 88 FL
MONOCYTES # BLD AUTO: 0.5 K/UL
MONOCYTES NFR BLD: 8.9 %
NEUTROPHILS # BLD AUTO: 3.5 K/UL
NEUTROPHILS NFR BLD: 58.1 %
NONHDLC SERPL-MCNC: 135 MG/DL
PLATELET # BLD AUTO: 269 K/UL
PMV BLD AUTO: 10.2 FL
POTASSIUM SERPL-SCNC: 4.4 MMOL/L
PROT SERPL-MCNC: 7.8 G/DL
RBC # BLD AUTO: 4.62 M/UL
SODIUM SERPL-SCNC: 138 MMOL/L
TRIGL SERPL-MCNC: 110 MG/DL
TSH SERPL DL<=0.005 MIU/L-ACNC: 3.1 UIU/ML
VIT B12 SERPL-MCNC: 753 PG/ML
WBC # BLD AUTO: 5.94 K/UL

## 2018-10-23 PROCEDURE — 84443 ASSAY THYROID STIM HORMONE: CPT

## 2018-10-23 PROCEDURE — 82306 VITAMIN D 25 HYDROXY: CPT

## 2018-10-23 PROCEDURE — 82607 VITAMIN B-12: CPT

## 2018-10-23 PROCEDURE — 83036 HEMOGLOBIN GLYCOSYLATED A1C: CPT

## 2018-10-23 PROCEDURE — 80061 LIPID PANEL: CPT

## 2018-10-23 PROCEDURE — 85025 COMPLETE CBC W/AUTO DIFF WBC: CPT

## 2018-10-23 PROCEDURE — 80053 COMPREHEN METABOLIC PANEL: CPT

## 2018-10-23 PROCEDURE — 36415 COLL VENOUS BLD VENIPUNCTURE: CPT

## 2018-10-23 RX ORDER — POLYETHYLENE GLYCOL 3350 17 G/17G
17 POWDER, FOR SOLUTION ORAL DAILY
Qty: 595 G | Refills: 3 | OUTPATIENT
Start: 2018-10-23 | End: 2019-11-05 | Stop reason: SDUPTHER

## 2018-10-23 NOTE — TELEPHONE ENCOUNTER
----- Message from Zora Ferrera sent at 10/23/2018  8:54 AM CDT -----  Contact: [facility] [phone]  Prescription Request:     Name of medication: polyethylene glycol (GLYCOLAX) 17 gram/dose powder    Reason for request: Refill    Pharmacy: [facility] [place] [facility] - [address] - [address]    Please advise.    Thank You

## 2018-10-31 RX ORDER — KETOCONAZOLE 20 MG/ML
SHAMPOO, SUSPENSION TOPICAL
Qty: 240 ML | Refills: 4 | Status: SHIPPED | OUTPATIENT
Start: 2018-10-31 | End: 2021-07-20 | Stop reason: SDUPTHER

## 2018-11-02 ENCOUNTER — OFFICE VISIT (OUTPATIENT)
Dept: INTERNAL MEDICINE | Facility: CLINIC | Age: 48
End: 2018-11-02
Payer: MEDICARE

## 2018-11-02 VITALS
WEIGHT: 145.69 LBS | BODY MASS INDEX: 22.87 KG/M2 | HEART RATE: 89 BPM | DIASTOLIC BLOOD PRESSURE: 70 MMHG | SYSTOLIC BLOOD PRESSURE: 122 MMHG | HEIGHT: 67 IN | OXYGEN SATURATION: 97 %

## 2018-11-02 DIAGNOSIS — R74.8 ELEVATED LIVER ENZYMES: ICD-10-CM

## 2018-11-02 DIAGNOSIS — E88.819 INSULIN RESISTANCE: Primary | ICD-10-CM

## 2018-11-02 DIAGNOSIS — R79.9 ABNORMAL BLOOD CHEMISTRY: ICD-10-CM

## 2018-11-02 DIAGNOSIS — G40.909 SEIZURE DISORDER: ICD-10-CM

## 2018-11-02 DIAGNOSIS — E03.9 HYPOTHYROIDISM, UNSPECIFIED TYPE: ICD-10-CM

## 2018-11-02 DIAGNOSIS — F63.9 IMPULSE CONTROL DISORDER: ICD-10-CM

## 2018-11-02 PROCEDURE — 99999 PR PBB SHADOW E&M-EST. PATIENT-LVL III: CPT | Mod: PBBFAC,,, | Performed by: INTERNAL MEDICINE

## 2018-11-02 PROCEDURE — 99214 OFFICE O/P EST MOD 30 MIN: CPT | Mod: S$PBB,,, | Performed by: INTERNAL MEDICINE

## 2018-11-02 PROCEDURE — 99213 OFFICE O/P EST LOW 20 MIN: CPT | Mod: PBBFAC,25 | Performed by: INTERNAL MEDICINE

## 2018-11-02 RX ORDER — HYDROXYZINE HYDROCHLORIDE 25 MG/1
TABLET, FILM COATED ORAL
Qty: 30 TABLET | Refills: 3 | Status: SHIPPED | OUTPATIENT
Start: 2018-11-02 | End: 2019-05-10 | Stop reason: SDUPTHER

## 2018-11-02 RX ORDER — DOCUSATE SODIUM 100 MG/1
100 CAPSULE, LIQUID FILLED ORAL 2 TIMES DAILY
COMMUNITY
End: 2023-09-24

## 2018-11-02 RX ORDER — SENNOSIDES 8.6 MG/1
2 TABLET ORAL DAILY
COMMUNITY
End: 2021-07-20

## 2018-11-02 NOTE — PROGRESS NOTES
CHIEF COMPLAINT: Follow up seizure disorder, hyperlipidemia, behavior disorder.     HISTORY OF PRESENT ILLNESS: 48-year-old man who presents with his direct care giver Garima, and Neha, his  through [address].  He is in the day program at [address] and does an outing once a week.    Bernard had a seizure a month ago. He had gone to the [employer]. When he got back from the [employer], he froze still, had a blank stare and urinated on himself. No tonic clonic activity. HE came out of it ok. He did not need to go to the ED.  EMS was called and he was fine.  Nurse checked on him. He continues to take lamictal 100 mg twice daily for his seizure disorder     Bernard has been doing well. Behavior is doing well on propranolol La 60 mg daily and Intuniv 3 mg daily. He rarely needs a prn medication for behavior. Mood is good. Sleep has been good. He is living in a quiet location which has helped his sleep. Mood has been happy. HE is currently taking Intuniv 3 mg daily, Propranolol LA 60 mg daily, Invega 9 mg in the morning, Seroquel 50 mg three times a day and Seroquel 200 mg at bedtime for his behavior disorder. He continues to see Dr Vides - last seen 6/2017 and due to see him again 12/2017.       Bernard needs constant supervision. He is redirected in his behavior. He will snatch food from others if left unsupervisied. He will eat until he throws up if left unsupervisied     He is on metformin 500 mg twice daily due to fatty liver and insulin resistance and lipitor 10 mg daily due to hyperlipidemia. Liver enzymes, blood sugar and cholesterol continue to gradually improve.    He is  on Synthroid 50 mcg daily for hypothryoidism. No apparent fatigue      He has a scaly rash on the left forearm. He tends to pick at that area.      He is on vitamin D 1000 units daily for vitamin D deficiency      He has recently had 2 ER visits for constipation. He is now taking miralax as needed and bowels have been regular.  "    PAST MEDICAL HISTORY:   1. Moderate to severe mental handicap.   2. Stereotypical movement disorder.   3. Impulse control disorder.   4. Seizure disorder.   5. Right undescended testis removed at age 1.   6. Hyperlipidemia - off meds currently.   7. Transaminiitis  8. Hypothyroidism    SOCIAL HISTORY: He does not smoke, does not drink. He is in supported independent living through [email or web address].     MEDICATIONS and ALLERGIES: Updated on epic     Review of systems: no fever, chills, visual change, hearing issues, sinus congestion, sore throat, shortness of breath, chest pain, abdominal pain, nausea, voimting, constipation, diarrhea, heartburn, urinary frequency, nocturia, joint pain or muscle pain, rashes, headaches, excessive thrist    PHYSICAL EXAMINATION:      /70   Pulse 89   Ht 5' 7" (1.702 m)   Wt 66.1 kg (145 lb 11.2 oz)   SpO2 97%   BMI 22.82 kg/m²     General: Alert, oriented. No apparent distress. Affect within normal   limits. Nonverbal.   Conjunctivae anicteric. Tympanic membranes clear. Oropharynx clear.   Neck supple.   Respiratory effort normal. Lungs clear to auscultation.   Heart: Regular rate and rhythm without murmurs, gallops or rubs.   No lower extremity edema.   Abdomen: Soft, nondistended, nontender. Bowel sounds present. No   hepatosplenomegaly.   No axillary lymphadenopathy.     - left testicle descended without masses. Right testicle absent. Penis without lesions  Feet - no erythema or warmth or caluses    ASSESSMENT AND PLAN:     1. Hyperlipidemia -stop lipitor since he has lost weight.  2. Transaminitis - stop metformin since he has lost weight  3. Glucose intolerance - stop metformin. since he has lost weight.   3. Seizure disorder - stable on Lamictal.   4. Moderately mentally handicapped with impulse control disorder -follow up with Dr Vides   5. Hypothyroidism - stable  6. Lichen planus vs psoriasis on arm - stable  7. Vitamin D deficiency - on replacement  8. " Constipation - stable. Discussed colonoscopy. Bambi will discuss with sister  I'll see him back in 4 months with labs prior, sooner if problems arise.

## 2019-01-03 ENCOUNTER — HOSPITAL ENCOUNTER (EMERGENCY)
Facility: HOSPITAL | Age: 49
Discharge: HOME | End: 2019-01-03
Attending: EMERGENCY MEDICINE
Payer: MEDICAID

## 2019-01-03 ENCOUNTER — TELEPHONE (OUTPATIENT)
Dept: INTERNAL MEDICINE | Facility: CLINIC | Age: 49
End: 2019-01-03

## 2019-01-03 VITALS
TEMPERATURE: 98 F | SYSTOLIC BLOOD PRESSURE: 121 MMHG | RESPIRATION RATE: 16 BRPM | DIASTOLIC BLOOD PRESSURE: 79 MMHG | HEART RATE: 84 BPM | WEIGHT: 150 LBS | BODY MASS INDEX: 21 KG/M2 | OXYGEN SATURATION: 100 % | HEIGHT: 71 IN

## 2019-01-03 DIAGNOSIS — F41.9 ANXIETY: ICD-10-CM

## 2019-01-03 DIAGNOSIS — G40.909 SEIZURE DISORDER: Primary | ICD-10-CM

## 2019-01-03 DIAGNOSIS — F29 PSYCHOSIS, UNSPECIFIED PSYCHOSIS TYPE: ICD-10-CM

## 2019-01-03 DIAGNOSIS — F63.9 IMPULSE CONTROL DISORDER: ICD-10-CM

## 2019-01-03 DIAGNOSIS — R46.89 AGGRESSIVE BEHAVIOR: ICD-10-CM

## 2019-01-03 LAB
ALBUMIN SERPL-MCNC: 4.2 G/DL (ref 3.3–5.5)
ALP SERPL-CCNC: 82 U/L (ref 42–141)
BILIRUB SERPL-MCNC: 0.8 MG/DL (ref 0.2–1.6)
BILIRUBIN, POC UA: ABNORMAL
BLOOD, POC UA: NEGATIVE
BUN SERPL-MCNC: 12 MG/DL (ref 7–22)
CALCIUM SERPL-MCNC: 9.7 MG/DL (ref 8–10.3)
CHLORIDE SERPL-SCNC: 101 MMOL/L (ref 98–108)
CLARITY, POC UA: CLEAR
COLOR, POC UA: ABNORMAL
CREAT SERPL-MCNC: 0.8 MG/DL (ref 0.6–1.2)
CTP QC/QA: YES
FLUAV AG NPH QL: NEGATIVE
FLUBV AG NPH QL: NEGATIVE
GLUCOSE SERPL-MCNC: 127 MG/DL (ref 73–118)
GLUCOSE, POC UA: NEGATIVE
KETONES, POC UA: ABNORMAL
LEUKOCYTE EST, POC UA: NEGATIVE
NITRITE, POC UA: NEGATIVE
PH UR STRIP: 5.5 [PH]
POC ALT (SGPT): 13 U/L (ref 10–47)
POC AST (SGOT): 23 U/L (ref 11–38)
POC B-TYPE NATRIURETIC PEPTIDE: 17 PG/ML (ref 0–100)
POC CARDIAC TROPONIN I: 0 NG/ML
POC TCO2: 26 MMOL/L (ref 18–33)
POTASSIUM BLD-SCNC: 3.4 MMOL/L (ref 3.6–5.1)
PROTEIN, POC UA: ABNORMAL
PROTEIN, POC: 7.4 G/DL (ref 6.4–8.1)
SAMPLE: NORMAL
SODIUM BLD-SCNC: 143 MMOL/L (ref 128–145)
SPECIFIC GRAVITY, POC UA: >=1.03
UROBILINOGEN, POC UA: 1 E.U./DL

## 2019-01-03 PROCEDURE — 99282 PR EMERGENCY DEPT VISIT,LEVEL II: ICD-10-PCS | Mod: AF,HB,, | Performed by: PSYCHIATRY & NEUROLOGY

## 2019-01-03 PROCEDURE — 63600175 PHARM REV CODE 636 W HCPCS: Mod: ER | Performed by: EMERGENCY MEDICINE

## 2019-01-03 PROCEDURE — 87804 INFLUENZA ASSAY W/OPTIC: CPT | Mod: ER

## 2019-01-03 PROCEDURE — 96372 THER/PROPH/DIAG INJ SC/IM: CPT | Mod: ER

## 2019-01-03 PROCEDURE — 25000003 PHARM REV CODE 250: Mod: ER | Performed by: NURSE PRACTITIONER

## 2019-01-03 PROCEDURE — 85025 COMPLETE CBC W/AUTO DIFF WBC: CPT | Mod: ER

## 2019-01-03 PROCEDURE — 84484 ASSAY OF TROPONIN QUANT: CPT | Mod: ER

## 2019-01-03 PROCEDURE — 99284 EMERGENCY DEPT VISIT MOD MDM: CPT | Mod: 25,ER

## 2019-01-03 PROCEDURE — 83880 ASSAY OF NATRIURETIC PEPTIDE: CPT | Mod: ER

## 2019-01-03 PROCEDURE — 80053 COMPREHEN METABOLIC PANEL: CPT | Mod: ER

## 2019-01-03 PROCEDURE — 99282 EMERGENCY DEPT VISIT SF MDM: CPT | Mod: AF,HB,, | Performed by: PSYCHIATRY & NEUROLOGY

## 2019-01-03 PROCEDURE — 81003 URINALYSIS AUTO W/O SCOPE: CPT | Mod: ER

## 2019-01-03 RX ORDER — QUETIAPINE FUMARATE 50 MG/1
TABLET, FILM COATED ORAL
Qty: 35 TABLET | Refills: 0 | Status: SHIPPED | OUTPATIENT
Start: 2019-01-03 | End: 2019-03-20

## 2019-01-03 RX ORDER — LORAZEPAM 1 MG/1
1 TABLET ORAL 2 TIMES DAILY
Qty: 10 TABLET | Refills: 0 | Status: SHIPPED | OUTPATIENT
Start: 2019-01-03 | End: 2019-03-20

## 2019-01-03 RX ORDER — POTASSIUM CHLORIDE 20 MEQ/1
20 TABLET, EXTENDED RELEASE ORAL
Status: COMPLETED | OUTPATIENT
Start: 2019-01-03 | End: 2019-01-03

## 2019-01-03 RX ADMIN — LORAZEPAM 2 MG: 2 INJECTION, SOLUTION INTRAMUSCULAR; INTRAVENOUS at 12:01

## 2019-01-03 RX ADMIN — POTASSIUM CHLORIDE 20 MEQ: 1500 TABLET, EXTENDED RELEASE ORAL at 01:01

## 2019-01-03 NOTE — ED NOTES
Patient quiet in the chair, waiting for Dr Harris to return call on telepsyc evaluation for possible need to admit for anxiety issues.

## 2019-01-03 NOTE — ED PROVIDER NOTES
Encounter Date: 1/3/2019       History     Chief Complaint   Patient presents with    Aggressive Behavior     caregiver says he's been acting out and they want to check to make sure everything is okay       A 48-year-old male who presents to the ED with his  for aggressive behavior.  His  states he has been having and aggressive behavior for the past 2 months.   She states this really worsened over the past week.  She reports increased agitation, insomnia, decreased appetite and weight loss.  Patient was recently taken off his metformin, blood pressure medicine and B/P medications two months ago.  Patient is currently taking Depakote and Seroquel which is prescribed by his psychiatrist.  His next appointment with the psychiatrist is not until March 2019.  His  states he has been on the same dosage of medications for years.  Patient has never been placed in a psychiatric facility because he is unable to comprehend and persisted patient and group therapy.  Patient lives in his own home and has 24 hr sitters.  Patient attends a day program during the day.  His  states she would just like to have him checked out make sure that everything is okay.      The history is provided by the patient.   General Illness    The current episode started several weeks ago. The problem has been gradually worsening. Pertinent negatives include no abdominal pain, no nausea, no vomiting, no congestion, no shortness of breath and no discharge. He has received no recent medical care.     Review of patient's allergies indicates:  No Known Allergies  Past Medical History:   Diagnosis Date    Behavioral problem     Glucose intolerance (impaired glucose tolerance) 11/20/2013    History of psychiatric care     Hyperlipidemia 11/28/2012    Hypothyroidism 11/28/2012    Insulin resistance 3/27/2014    Liver disease     fatty infiltration, prior Hepatitis B exposure    Mental retardation      Moderate mental retardation 11/28/2012    Personal history of seizure disorder     Profound mental retardation in adult     Psychiatric problem     Seizure disorder 11/28/2012    Self-injurious behavior     Therapy      Past Surgical History:   Procedure Laterality Date    HERNIA REPAIR       Family History   Family history unknown: Yes     Social History     Tobacco Use    Smoking status: Never Smoker    Smokeless tobacco: Never Used   Substance Use Topics    Alcohol use: No    Drug use: No     Review of Systems   Constitutional: Negative.  Negative for activity change.   HENT: Negative.  Negative for congestion.    Eyes: Negative.  Negative for discharge.   Respiratory: Negative.  Negative for shortness of breath.    Cardiovascular: Negative.  Negative for chest pain.   Gastrointestinal: Negative.  Negative for abdominal pain, nausea and vomiting.   Genitourinary: Negative.  Negative for dysuria.   Musculoskeletal: Negative.    Skin: Negative.    Neurological: Negative.  Negative for weakness.   Hematological: Does not bruise/bleed easily.   Psychiatric/Behavioral: Positive for agitation and behavioral problems.   All other systems reviewed and are negative.      Physical Exam     Initial Vitals [01/03/19 1114]   BP Pulse Resp Temp SpO2   118/77 90 16 98 °F (36.7 °C) 100 %      MAP       --         Physical Exam    Nursing note and vitals reviewed.  Constitutional: Vital signs are normal. He appears well-developed.   HENT:   Head: Normocephalic.   Right Ear: External ear normal.   Left Ear: External ear normal.   Nose: Nose normal.   Mouth/Throat: Oropharynx is clear and moist.   Eyes: Conjunctivae, EOM and lids are normal. Pupils are equal, round, and reactive to light.   Neck: Normal range of motion. Neck supple.   Cardiovascular: Normal rate, regular rhythm, S1 normal, S2 normal and normal heart sounds.   Pulmonary/Chest: Effort normal and breath sounds normal.   Abdominal: Soft. Normal  appearance. There is no tenderness.   Musculoskeletal: Normal range of motion.   FROM of all extremities   Neurological: He is alert.     Patient nonverbal.   Skin: Skin is warm, dry and intact.   Psychiatric: He has a normal mood and affect. His speech is normal.         ED Course   Critical Care  Date/Time: 1/5/2019 9:20 AM  Performed by: Terese Figueroa DO  Authorized by: Terese Figueroa DO   Direct patient critical care time: 10 minutes  Additional history critical care time: 10 minutes  Ordering / reviewing critical care time: 10 minutes  Documentation critical care time: 10 minutes  Consulting other physicians critical care time: 10 minutes  Total critical care time (exclusive of procedural time) : 50 minutes  Critical care was time spent personally by me on the following activities: evaluation of patient's response to treatment, examination of patient, obtaining history from patient or surrogate, ordering and performing treatments and interventions, ordering and review of laboratory studies, ordering and review of radiographic studies, pulse oximetry, re-evaluation of patient's condition and review of old charts.  Comments:  Psych consult ordered and completed in the ED.  Psychiatrist recommends increasing Ativan and Seroquel.  Patient is to follow up with his psychiatrist in 1 day.  Return to the ED if symptoms worsen or do not improve      I have reviewed the notes, assessments, and/or procedures performed by NP/PA, I concur with her/his documentation of Bernard Olvera.  Attending:   Physician Attestation Statement: I have reviewed this case with my non-physician provider.   Physician Attestation Statement: I have provided a face to face evaluation of this patient at the request of my non-physician provider.  I agree with the HPI, review of systems, and physical exam, as documented.  The patient's condition warranted physician involvement.  Other Attend Additions:   Physical Exam: Awake alert oriented ×4  speaking clearly in full sentences.    Regular rate and rhythm no murmur gallop or rub.   Clear to auscultation bilateral without wheeze crackles or Rales   Abdomen soft, nontender, nondistended. Bowel sounds ×4.   Neurological: He is alert. Patient is agitated    Patient nonverbal.     Medical Decision Making:     I reviewed radiology and Lab Data.    The treatment regimen was reviewed by me.    Patient behavior improved after Ativan given in the ER  Prescriptions and outpatient management per Psychiatry consult   Patient is to return to the Emergency department if symptoms worsen or do not resolve.              Labs Reviewed   POCT URINALYSIS W/O SCOPE - Abnormal; Notable for the following components:       Result Value    Glucose, UA Negative (*)     Bilirubin, UA 2+ (*)     Ketones, UA Trace (*)     Spec Grav UA >=1.030 (*)     Blood, UA Negative (*)     Protein, UA 1+ (*)     Nitrite, UA Negative (*)     Leukocytes, UA Negative (*)     All other components within normal limits   POCT CMP - Abnormal; Notable for the following components:    POC Glucose 127 (*)     POC Potassium 3.4 (*)     All other components within normal limits   TROPONIN ISTAT   TSH   POCT CBC   POCT INFLUENZA A/B   POCT URINALYSIS W/O SCOPE   POCT CMP   POCT TROPONIN   POCT B-TYPE NATRIURETIC PEPTIDE (BNP)   POCT B-TYPE NATRIURETIC PEPTIDE (BNP)          Imaging Results          X-Ray Chest AP Portable (Final result)  Result time 01/03/19 12:20:40    Final result by Angelito Teixeira MD (01/03/19 12:20:40)                 Impression:      No radiographic acute intrathoracic process seen.      Electronically signed by: Angelito Teixeira MD  Date:    01/03/2019  Time:    12:20             Narrative:    EXAMINATION:  XR CHEST AP PORTABLE    CLINICAL HISTORY:  Other symptoms and signs involving appearance and behavior    TECHNIQUE:  Single frontal view of the chest was performed.    COMPARISON:  Chest radiograph 02/04/2017    FINDINGS:  Bilateral lung  apices are partially obscured by overlying chin soft tissues.  Cardiomediastinal silhouette is midline and within normal limits.  Pulmonary vasculature and hilar regions are within normal limits.  The lungs are symmetrically well expanded and clear.  No pleural effusion or pneumothorax.  No acute osseous process seen.  PA and lateral views can be obtained.                                 Medical Decision Making:   Initial Assessment:   A 48-year-old male who presents to the ED with his  for aggressive behavior.  His  states he has been having and aggressive behavior for the past 2 months.   She states this really worsened over the past week.  She reports increased agitation, insomnia, decreased appetite and weight loss.  Patient was recently taken off his metformin, blood pressure medicine and B/P medications two months ago.  Patient is currently taking Depakote and Seroquel which is prescribed by his psychiatrist.  His next appointment with the psychiatrist is not until March 20, 2019.  His  states he has been on the same dosage of medications for years.     Differential Diagnosis:    Urinary tract infection,  Influenza,  pneumonia  Clinical Tests:   Lab Tests: Ordered and Reviewed  Radiological Study: Ordered and Reviewed  ED Management:  Labs ordered.  1310 Dr. Leonardo natarajan. 1318 Dr. Mahogany natarajan. Spoked with Krupa in office. Next appt 3/20/2019. Will place on cancellation list.   1442. [facility] called. Dr. Harris will call back.   1642   Tele psych completed. Spoked with Dr. Harris. She recommended  Seroquel 250 mg daily at 8 pm and Ativan 1 mg BID po, continue other meds.   Follow-up with PCP in 1-5 days.   Return to ED for worsening of symptoms.                       Clinical Impression:   1) Aggressive behavior  2) Anxiety                              MICAH Healy  01/03/19 3677       Terese Figueroa DO  01/05/19 8175

## 2019-01-03 NOTE — CONSULTS
Tele-Consultation to Emergency Department from Psychiatry   FULL note to follow    HPI  Bernard Manoj Olvera is a 48 y.o. male with past psych h/o impulse control d/o and intellectual disability presenting with his  after weeks of worsening behavioral issues. Pt has not had any acute issues with his physical health. He medical doctor stopped Lipitor and Metformin. Pt is non verbal and  discussed most of his problems and behaviors.    Impression  impulse control d/o   intellectual disability    RECS  Dispo- Once medically cleared, pt may be discharged home with case bubba Solomon as he is psychiatrically stable and doesn't warrant inpt psych admission.  Pt to follow up with outpt psychiatrist soon, coordinating an early appointment.    LEGAL- Pt NOT in any imminent danger of hurting self or others and not gravely disabled. Pt currently does not meet criteria nor benefit from from involuntary inpatient psychiatric admission.       Psych meds- PLEASE PROVIDE THIS LIST TO PTS   · INCREASE QUEtiapine (SEROQUEL) 250 MG Tab, TAKE 1 TABLET BY MOUTH Nightly at 8pm  · SCHEDULE   LORazepam (ATIVAN) 1 MG tablet BID    CONTINUE THE FOLLOWING    guanFACINE 3 mg Tb24, TAKE 1 TABLET BY MOUTH every morning at 8am, Disp: 30 tablet, Rfl: 6    hydrOXYzine HCl (ATARAX) 25 MG tablet, Take 1 tablet (25 mg total) by mouth every 6 (six) hours as needed for Itching or Anxiety., Disp: 120 tablet, Rfl: 6    hydrOXYzine HCl (ATARAX) 25 MG tablet, One tablet mid day, Disp: 30 tablet, Rfl: 3    lamoTRIgine (LAMICTAL) 100 MG tablet, TAKE 1 TABLET BY MOUTH twice a day (8am/8pm), Disp: 180 tablet, Rfl: 3    LORazepam (ATIVAN) 1 MG tablet, 1 tablet po every 6 hours as needed for agitation; may give with Seroquel 50 mg prn, Disp: 60 tablet, Rfl: 5    paliperidone (INVEGA) 9 MG TR24, TAKE 1 TABLET BY MOUTH daily at 8am, Disp: 30 tablet, Rfl: 6    propranolol (INDERAL LA) 60 MG 24 hr capsule, TAKE 1 CAPSULE BY  MOUTH once daily at 8pm, Disp: 90 capsule, Rfl: 3    QUEtiapine (SEROQUEL) 25 MG Tab, Take 2 tablets (50 mg total) by mouth every 6 (six) hours as needed.,    QUEtiapine (SEROQUEL) 50 MG tablet, TAKE 1 TABLET BY MOUTH three times a day (8am/noon/5pm), Disp: 270 tablet, Rfl: 3    -Please contact ON CALL psychiatry resident for any acute issues that may arise.    MISAEL MCKEON MD   Department of Psychiatry   [facility]  1/3/2019 4:45 PM

## 2019-01-03 NOTE — CONSULTS
Tele-Consultation to Emergency Department from Psychiatry    Please see previous notes:    Patient agreeable to consultation via telepsychiatry.    Consultation started: 1/3/2019 at 3:53pm  The chief complaint leading to psychiatric consultation is: agitation/ aggression    This consultation was requested by MICAH Healy, the Emergency Department attending physician.  The location of the consulting psychiatrist is [address] [place] [address].  The patient location is [facility].  The patient arrived at the ED at: 11:45am    Also present with the patient at the time of the consultation:     Patient Identification:  Bernard Olvera is a 48 y.o. male.    Patient information was obtained from patient, caregiver / friend and past medical records.  Patient presented voluntarily to the Emergency Department ambulatory.    History of Present Illness:  Bernard Olvera is a 48 y.o. male with past psych h/o impulse control d/o and severe intellectual disability presenting with his  after weeks of worsening behavioral issues. Pt has not had any acute issues with his physical health. He medical doctor stopped Lipitor and Metformin. Pt is non verbal and  discussed most of his problems and behaviors.    Today,  Unable to talk to pt due to being non verbal. Pt with limited response when calling name. Pt was busy eating. Towards the end of the interview pt gets up from chair and blows kisses to the camera when this writer waived bye in the camera.    Spoke to pts Case lakisha Enriquezy Ibarra   She reports his behaviors have been worsening over the past couple of months. Last 2 weeks has not been sleeping and exhibiting hyperactive behaviors and agitated behaviors. She reports that seroquel is not working. Today banging his head in the day program and was not redirectable. She was concerned something else was going on and this prompted her to bring him to the ED.    Denied any changes his routine or interactions with others other than family visiting for the holidays which may have made him sad when they left.    Psychiatric History:   Hospitalization: Yes  Medication Trials: Yes  Suicide Attempts: no  Violence: none  Depression: none  Yasmeen: none  AH's: none  Delusions:none    Review of Systems:  History obtained from unobtainable from patient due to mental status and language barrier    Past Medical History:   Past Medical History:   Diagnosis Date    Behavioral problem     Glucose intolerance (impaired glucose tolerance) 11/20/2013    History of psychiatric care     Hyperlipidemia 11/28/2012    Hypothyroidism 11/28/2012    Insulin resistance 3/27/2014    Liver disease     fatty infiltration, prior Hepatitis B exposure    Mental retardation     Moderate mental retardation 11/28/2012    Personal history of seizure disorder     Profound mental retardation in adult     Psychiatric problem     Seizure disorder 11/28/2012    Self-injurious behavior     Therapy       Allergies:   Review of patient's allergies indicates:  No Known Allergies    Medications in ER:   Medications   lorazepam (ATIVAN) injection 2 mg (2 mg Intramuscular Given 1/3/19 1239)   potassium chloride SA CR tablet 20 mEq (20 mEq Oral Given 1/3/19 1341)       Medications at home:   Current Outpatient Medications:     atorvastatin (LIPITOR) 10 MG tablet, Take 1 tablet (10 mg total) by mouth once daily., Disp: 30 tablet, Rfl: 6    docusate sodium (COLACE) 100 MG capsule, Take 100 mg by mouth 2 (two) times daily., Disp: , Rfl:     guanFACINE 3 mg Tb24, TAKE 1 TABLET BY MOUTH every morning at 8am, Disp: 30 tablet, Rfl: 6    hydrOXYzine HCl (ATARAX) 25 MG tablet, Take 1 tablet (25 mg total) by mouth every 6 (six) hours as needed for Itching or Anxiety., Disp: 120 tablet, Rfl: 6    hydrOXYzine HCl (ATARAX) 25 MG tablet, One tablet mid day, Disp: 30 tablet, Rfl: 3    ketoconazole (NIZORAL) 2 %  shampoo, USE TO WASH HAIR TWICE A WEEK AS DIRECTED, Disp: 240 mL, Rfl: 4    lamoTRIgine (LAMICTAL) 100 MG tablet, TAKE 1 TABLET BY MOUTH twice a day (8am/8pm), Disp: 180 tablet, Rfl: 3    levothyroxine (SYNTHROID) 50 MCG tablet, TAKE 1 TABLET BY MOUTH before breakfast daily at 8am, Disp: 90 tablet, Rfl: 3    LORazepam (ATIVAN) 1 MG tablet, 1 tablet po every 6 hours as needed for agitation; may give with Seroquel 50 mg prn, Disp: 60 tablet, Rfl: 5    omega-3 fatty acids-vitamin E 1,000 mg Cap, One tablet twic edaily, Disp: 60 each, Rfl: 11    paliperidone (INVEGA) 9 MG TR24, TAKE 1 TABLET BY MOUTH daily at 8am, Disp: 30 tablet, Rfl: 6    pantoprazole (PROTONIX) 40 MG tablet, TAKE 1 TABLET BY MOUTH once daily at 8am, Disp: 90 tablet, Rfl: 3    polyethylene glycol (GLYCOLAX) 17 gram/dose powder, Take 17 g by mouth once daily., Disp: 595 g, Rfl: 3    propranolol (INDERAL LA) 60 MG 24 hr capsule, TAKE 1 CAPSULE BY MOUTH once daily at 8pm, Disp: 90 capsule, Rfl: 3    QUEtiapine (SEROQUEL) 200 MG Tab, TAKE 1 TABLET BY MOUTH Nightly at 8pm, Disp: 90 tablet, Rfl: 3    QUEtiapine (SEROQUEL) 25 MG Tab, Take 2 tablets (50 mg total) by mouth every 6 (six) hours as needed., Disp: 60 tablet, Rfl: 6    QUEtiapine (SEROQUEL) 50 MG tablet, TAKE 1 TABLET BY MOUTH three times a day (8am/noon/5pm), Disp: 270 tablet, Rfl: 3    ranitidine (ZANTAC) 300 MG tablet, TAKE 1 TABLET BY MOUTH once daily at 8pm, Disp: 90 tablet, Rfl: 3    senna (SENOKOT) 8.6 mg tablet, Take 2 tablets by mouth once daily., Disp: , Rfl:     VITAMIN D3 1,000 unit tablet, TAKE 1 TABLET BY MOUTH EVERY DAY, Disp: 30 tablet, Rfl: 2    Substance Abuse History:   Alchohol: none  Drug: none    Legal History:   Past charges/incarcerations: unable to assess due to intellectual disability  Pending charges:  unable to assess due to intellectual disability    Family Psychiatric History:  unable to assess due to intellectual disability    Social History:   History  "of Physical/Sexual Abuse:  unable to assess due to intellectual disability  Education:  unable to assess due to intellectual disability  Employment/Disability: disability  Financial:  unable to assess due to intellectual disability  Relationship Status/Sexual Orientation: none  Children: none  Housing Status: group home  Rastafarian:  unable to assess due to intellectual disability   History: none  Recreational Activities: attend day program  Access to Gun: none    Current Evaluation:     Constitutional  Vitals:  Vitals:    01/03/19 1114   BP: 118/77   Pulse: 90   Resp: 16   Temp: 98 °F (36.7 °C)   TempSrc: Oral   SpO2: 100%   Weight: 68 kg (150 lb)   Height: 5' 11" (1.803 m)      General:  unremarkable, age appropriate     Musculoskeletal  Muscle Strength/Tone:   moving arms normally   Gait & Station:   sitting on stretcher     Psychiatric  Level of Consciousness:awake   Orientation: oriented to person only  Grooming: casually dressed  Psychomotor Behavior: + restlessness and psychomotor agitation of self stimulating behavior lightly  hitting face and arms  Speech:  unable to assess due to intellectual disability  Language: mostly mute  Mood: anxious  Affect: smiled and gestured as if blowing kisses to this writer  Thought Process: limited,  unable to assess due to intellectual disability  Associations:  unable to assess due to intellectual disability  Thought Content:  unable to assess due to intellectual disability  Memory: unable to assess due to intellectual disability  Attention: limited needed freq. redirection  Fund of Knowledge: appears inadequate due to intellectual disability  Insight: impaired / unable to assess likely at baseline per SW  Judgement: limited    Relevant Elements of Neurological Exam: no abnormality of posture noted    Assessment - Diagnosis - Goals:     Diagnosis/Impression:   Impulse control d/o   Severe Intellectual disability     RECS  Dispo- Once medically cleared, pt may be " discharged home with case bubba Solomon as he is psychiatrically stable and doesn't warrant inpt psych admission.  Pt to follow up with outpt psychiatrist soon, coordinating an early appointment.     LEGAL- Pt NOT in any imminent danger of hurting self or others and not gravely disabled. Pt currently does not meet criteria nor benefit from from involuntary inpatient psychiatric admission.         Psych meds- PLEASE PROVIDE THIS LIST TO PTS   · INCREASE QUEtiapine (SEROQUEL) 250 MG Tab, TAKE 1 TABLET BY MOUTH Nightly at 8pm  · SCHEDULE   LORazepam (ATIVAN) 1 MG tablet BID     CONTINUE THE FOLLOWING    guanFACINE 3 mg Tb24, TAKE 1 TABLET BY MOUTH every morning at 8am, Disp: 30 tablet, Rfl: 6    hydrOXYzine HCl (ATARAX) 25 MG tablet, Take 1 tablet (25 mg total) by mouth every 6 (six) hours as needed for Itching or Anxiety., Disp: 120 tablet, Rfl: 6    hydrOXYzine HCl (ATARAX) 25 MG tablet, One tablet mid day, Disp: 30 tablet, Rfl: 3    lamoTRIgine (LAMICTAL) 100 MG tablet, TAKE 1 TABLET BY MOUTH twice a day (8am/8pm), Disp: 180 tablet, Rfl: 3    LORazepam (ATIVAN) 1 MG tablet, 1 tablet po every 6 hours as needed for agitation; may give with Seroquel 50 mg prn, Disp: 60 tablet, Rfl: 5    paliperidone (INVEGA) 9 MG TR24, TAKE 1 TABLET BY MOUTH daily at 8am, Disp: 30 tablet, Rfl: 6    propranolol (INDERAL LA) 60 MG 24 hr capsule, TAKE 1 CAPSULE BY MOUTH once daily at 8pm, Disp: 90 capsule, Rfl: 3    QUEtiapine (SEROQUEL) 25 MG Tab, Take 2 tablets (50 mg total) by mouth every 6 (six) hours as needed.,    QUEtiapine (SEROQUEL) 50 MG tablet, TAKE 1 TABLET BY MOUTH three times a day (8am/noon/5pm), Disp: 270 tablet, Rfl: 3     -Please contact ON CALL psychiatry resident for any acute issues that may arise.        More than 50% of the time was spent counseling/coordinating care    Laboratory Data:   Labs Reviewed   POCT URINALYSIS W/O SCOPE - Abnormal; Notable for the following components:       Result  Value    Glucose, UA Negative (*)     Bilirubin, UA 2+ (*)     Ketones, UA Trace (*)     Spec Grav UA >=1.030 (*)     Blood, UA Negative (*)     Protein, UA 1+ (*)     Nitrite, UA Negative (*)     Leukocytes, UA Negative (*)     All other components within normal limits   POCT CMP - Abnormal; Notable for the following components:    POC Glucose 127 (*)     POC Potassium 3.4 (*)     All other components within normal limits   TROPONIN ISTAT   TSH   POCT CBC   POCT INFLUENZA A/B   POCT URINALYSIS W/O SCOPE   POCT CMP   POCT TROPONIN   POCT B-TYPE NATRIURETIC PEPTIDE (BNP)   POCT B-TYPE NATRIURETIC PEPTIDE (BNP)         Consulting clinician was informed of the encounter and consult note.    Consultation ended: 1/3/2019 at 4:46pm

## 2019-01-03 NOTE — TELEPHONE ENCOUNTER
Pt is at the ER with , Spoke with Tricia Kaba NP, pt is acting out and being combative not allowing the ER to draw any labs.  states the acting out has been going on for months but within the last week has gotten worse. Sesar was hoping PCP could call ER and try and calm pt down. PCP paged.

## 2019-01-03 NOTE — TELEPHONE ENCOUNTER
----- Message from Zora Ferrera sent at 1/3/2019 12:50 PM CST -----  Contact: scarlett with [facility]  [phone]      ----- Message -----  From: Mary Angel  Sent: 1/3/2019  12:43 PM  To: Leonardo LARSEN Staff    Scarlett with [facility]>  They have patient there and needs to speak with you asap

## 2019-01-03 NOTE — ED NOTES
Unable to obtain blood patient, pt. Will be given Ativan for calming so we may be able to draw his blood.

## 2019-01-03 NOTE — DISCHARGE INSTRUCTIONS
Ativan 1 mg twice a day.   Seroquel 250 mg at 8 pm   Follow-up with PCP in 3-5 days.   Follow-up with psychiatrist next week.   Return ED for worsening of symptoms

## 2019-01-03 NOTE — ED NOTES
Dr. Harris called back to get the phone number of the care taker  [phone]  Dr. Scott will call Mrs Neha Ibarra back with an appointment for the patient.  [phone]

## 2019-01-03 NOTE — ED NOTES
Spoke with lab, they did not have a green top for a send off for the TSH,   Tricia Spencer NP made aware she is ok with this lab not collected Do not restick the patient.

## 2019-02-08 ENCOUNTER — LAB VISIT (OUTPATIENT)
Dept: LAB | Facility: HOSPITAL | Age: 49
End: 2019-02-08
Attending: INTERNAL MEDICINE
Payer: MEDICAID

## 2019-02-08 DIAGNOSIS — R79.9 ABNORMAL BLOOD CHEMISTRY: ICD-10-CM

## 2019-02-08 DIAGNOSIS — E03.9 HYPOTHYROIDISM, UNSPECIFIED TYPE: ICD-10-CM

## 2019-02-08 LAB
ALBUMIN SERPL BCP-MCNC: 3.8 G/DL
ALP SERPL-CCNC: 77 U/L
ALT SERPL W/O P-5'-P-CCNC: 9 U/L
ANION GAP SERPL CALC-SCNC: 8 MMOL/L
AST SERPL-CCNC: 13 U/L
BASOPHILS # BLD AUTO: 0.03 K/UL
BASOPHILS NFR BLD: 0.4 %
BILIRUB SERPL-MCNC: 0.4 MG/DL
BUN SERPL-MCNC: 14 MG/DL
CALCIUM SERPL-MCNC: 9.4 MG/DL
CHLORIDE SERPL-SCNC: 104 MMOL/L
CHOLEST SERPL-MCNC: 133 MG/DL
CHOLEST/HDLC SERPL: 3.3 {RATIO}
CO2 SERPL-SCNC: 28 MMOL/L
CREAT SERPL-MCNC: 0.8 MG/DL
DIFFERENTIAL METHOD: ABNORMAL
EOSINOPHIL # BLD AUTO: 0.3 K/UL
EOSINOPHIL NFR BLD: 4.2 %
ERYTHROCYTE [DISTWIDTH] IN BLOOD BY AUTOMATED COUNT: 13 %
EST. GFR  (AFRICAN AMERICAN): >60 ML/MIN/1.73 M^2
EST. GFR  (NON AFRICAN AMERICAN): >60 ML/MIN/1.73 M^2
ESTIMATED AVG GLUCOSE: 100 MG/DL
GLUCOSE SERPL-MCNC: 84 MG/DL
HBA1C MFR BLD HPLC: 5.1 %
HCT VFR BLD AUTO: 38.6 %
HDLC SERPL-MCNC: 40 MG/DL
HDLC SERPL: 30.1 %
HGB BLD-MCNC: 11.8 G/DL
LDLC SERPL CALC-MCNC: 81.8 MG/DL
LYMPHOCYTES # BLD AUTO: 1.2 K/UL
LYMPHOCYTES NFR BLD: 17.2 %
MCH RBC QN AUTO: 27.6 PG
MCHC RBC AUTO-ENTMCNC: 30.6 G/DL
MCV RBC AUTO: 90 FL
MONOCYTES # BLD AUTO: 0.7 K/UL
MONOCYTES NFR BLD: 9.4 %
NEUTROPHILS # BLD AUTO: 4.7 K/UL
NEUTROPHILS NFR BLD: 68.7 %
NONHDLC SERPL-MCNC: 93 MG/DL
PLATELET # BLD AUTO: 269 K/UL
PMV BLD AUTO: 9.8 FL
POTASSIUM SERPL-SCNC: 4 MMOL/L
PROT SERPL-MCNC: 6.9 G/DL
RBC # BLD AUTO: 4.27 M/UL
SODIUM SERPL-SCNC: 140 MMOL/L
TRIGL SERPL-MCNC: 56 MG/DL
TSH SERPL DL<=0.005 MIU/L-ACNC: 2.09 UIU/ML
WBC # BLD AUTO: 6.9 K/UL

## 2019-02-08 PROCEDURE — 83036 HEMOGLOBIN GLYCOSYLATED A1C: CPT

## 2019-02-08 PROCEDURE — 84443 ASSAY THYROID STIM HORMONE: CPT

## 2019-02-08 PROCEDURE — 36415 COLL VENOUS BLD VENIPUNCTURE: CPT

## 2019-02-08 PROCEDURE — 80053 COMPREHEN METABOLIC PANEL: CPT

## 2019-02-08 PROCEDURE — 85025 COMPLETE CBC W/AUTO DIFF WBC: CPT

## 2019-02-08 PROCEDURE — 80061 LIPID PANEL: CPT

## 2019-02-12 ENCOUNTER — OFFICE VISIT (OUTPATIENT)
Dept: INTERNAL MEDICINE | Facility: CLINIC | Age: 49
End: 2019-02-12
Payer: MEDICAID

## 2019-02-12 VITALS
HEIGHT: 72 IN | BODY MASS INDEX: 18.07 KG/M2 | HEART RATE: 60 BPM | WEIGHT: 133.38 LBS | OXYGEN SATURATION: 99 % | DIASTOLIC BLOOD PRESSURE: 62 MMHG | SYSTOLIC BLOOD PRESSURE: 124 MMHG

## 2019-02-12 DIAGNOSIS — E03.8 OTHER SPECIFIED HYPOTHYROIDISM: ICD-10-CM

## 2019-02-12 DIAGNOSIS — F73 PROFOUND INTELLECTUAL DISABILITY: ICD-10-CM

## 2019-02-12 DIAGNOSIS — E78.49 OTHER HYPERLIPIDEMIA: ICD-10-CM

## 2019-02-12 DIAGNOSIS — Z12.11 SCREENING FOR MALIGNANT NEOPLASM OF COLON: ICD-10-CM

## 2019-02-12 DIAGNOSIS — G40.909 SEIZURE DISORDER: ICD-10-CM

## 2019-02-12 DIAGNOSIS — R63.4 WEIGHT LOSS: Primary | ICD-10-CM

## 2019-02-12 DIAGNOSIS — D64.9 ANEMIA, UNSPECIFIED TYPE: ICD-10-CM

## 2019-02-12 DIAGNOSIS — E46 MALNUTRITION, UNSPECIFIED TYPE: ICD-10-CM

## 2019-02-12 DIAGNOSIS — E53.8 VITAMIN B12 DEFICIENCY: ICD-10-CM

## 2019-02-12 DIAGNOSIS — E55.9 VITAMIN D DEFICIENCY DISEASE: ICD-10-CM

## 2019-02-12 DIAGNOSIS — K21.9 GASTROESOPHAGEAL REFLUX DISEASE, ESOPHAGITIS PRESENCE NOT SPECIFIED: ICD-10-CM

## 2019-02-12 PROCEDURE — 99999 PR PBB SHADOW E&M-EST. PATIENT-LVL V: CPT | Mod: PBBFAC,,, | Performed by: INTERNAL MEDICINE

## 2019-02-12 PROCEDURE — 99999 PR PBB SHADOW E&M-EST. PATIENT-LVL V: ICD-10-PCS | Mod: PBBFAC,,, | Performed by: INTERNAL MEDICINE

## 2019-02-12 PROCEDURE — 99214 OFFICE O/P EST MOD 30 MIN: CPT | Mod: S$PBB,,, | Performed by: INTERNAL MEDICINE

## 2019-02-12 PROCEDURE — 99215 OFFICE O/P EST HI 40 MIN: CPT | Mod: PBBFAC | Performed by: INTERNAL MEDICINE

## 2019-02-12 PROCEDURE — 99214 PR OFFICE/OUTPT VISIT, EST, LEVL IV, 30-39 MIN: ICD-10-PCS | Mod: S$PBB,,, | Performed by: INTERNAL MEDICINE

## 2019-02-12 RX ORDER — DICYCLOMINE HYDROCHLORIDE 10 MG/ML
10 INJECTION INTRAMUSCULAR
COMMUNITY
End: 2019-02-12

## 2019-02-12 NOTE — PROGRESS NOTES
CHIEF COMPLAINT: Follow up seizure disorder, hyperlipidemia, behavior disorder.     HISTORY OF PRESENT ILLNESS: 48-year-old man who presents with  Neha, his  through [address].  He is in the day program at [address] and does an outing once a week.    Bernard continues to lose weight. He has lost 12 more pounds since our last visit in November. Neha reports that he is in constant motion. He is not sleeping well at night. He is pacing and banging furniture at night. He is refusing to eat meat.  He will eat certain foods. No apparent swallowing, nausea, vomiting, diarrhea. His bowel movements have not been as regular. He now has a BM every other day with the help of miralax.  No black tarry stools or bloody stools.     HE has not had a seizure since our last visit. He had a seizure this last fall after going to the [facility].  He continues to take lamictal 100 mg twice daily for his seizure disorder      Behavior has been worse. He is in constant motion. He seems happy. He is making more eye contact. When he gets excited, he will bang his head.  No apparent depression. HE is currently taking Intuniv 3 mg daily, Propranolol LA 60 mg daily, Invega 9 mg in the morning, Seroquel 50 mg three times a day and Seroquel 200 mg at bedtime for his behavior disorder. He continues to see Dr Vides - last seen 3/2018. Bernard needs constant supervision.      He is off metformin and atorvastatin due to weigh tloss.     He is  on Synthroid 50 mcg daily for hypothryoidism. No apparent fatigue      He has a scaly rash on the left forearm. He tends to pick at that area.      He is on vitamin D 1000 units daily for vitamin D deficiency     PAST MEDICAL HISTORY:   1. Moderate to severe mental handicap.   2. Stereotypical movement disorder.   3. Impulse control disorder.   4. Seizure disorder.   5. Right undescended testis removed at age 1.   6. Hyperlipidemia - off meds currently.   7. Transaminiitis  8.  Hypothyroidism    SOCIAL HISTORY: He does not smoke, does not drink. He is in supported independent living through [facility].     MEDICATIONS and ALLERGIES: Updated on epic     Review of systems: no fever, chills, visual change, hearing issues, sinus congestion, sore throat, shortness of breath, chest pain, abdominal pain, nausea, voimting, constipation, diarrhea, heartburn, urinary frequency, nocturia, joint pain or muscle pain, rashes, headaches, excessive thrist    PHYSICAL EXAMINATION:     /62 (BP Location: Left arm, Patient Position: Sitting, BP Method: Medium (Manual))   Pulse 60   Ht 6' (1.829 m)   Wt 60.5 kg (133 lb 6.1 oz)   SpO2 99%   BMI 18.09 kg/m²     General: Alert, oriented. No apparent distress. Affect within normal   limits. Nonverbal.   Conjunctivae anicteric. Tympanic membranes clear. Oropharynx clear.   Neck supple.   Respiratory effort normal. Lungs clear to auscultation.   Heart: Regular rate and rhythm without murmurs, gallops or rubs.   No lower extremity edema.   Abdomen: Soft, nondistended, nontender. Bowel sounds present. No   hepatosplenomegaly.   No axillary lymphadenopathy.     - left testicle descended without masses. Right testicle absent. Penis without lesions  Feet - no erythema or warmth or caluses  Rectal - no masses - stool heme negative.     Labs 2/8/19 reviewed - mildly anemic with hemoglobin of 11.8    ASSESSMENT AND PLAN:     1. Weight loss- encouraged high calorie/high protein foods. Nutritional supplement, one bottle three times daily.   2. Anemia - labs today. EGD and colonosocpy  3. Transaminitis - resolved since he has lost weight  4. Glucose intolerance - resolved since he has lost weight.   5. Seizure disorder - stable on Lamictal.   6. Moderately mentally handicapped with impulse control disorder -follow up with Dr Vides - it is possible that his weight loss is due to his increase in activity  7. Hypothyroidism - stable  8. Lichen planus vs  psoriasis on arm - stable  9. Vitamin D deficiency - on replacement  10. Constipation -  colonoscopy.   11. GERD - colonoscopy  I'll see him back in 2 months with labs prior, sooner if problems arise.

## 2019-02-27 DIAGNOSIS — F29 PSYCHOSIS, UNSPECIFIED PSYCHOSIS TYPE: ICD-10-CM

## 2019-02-27 DIAGNOSIS — F63.9 IMPULSE CONTROL DISORDER: ICD-10-CM

## 2019-03-10 ENCOUNTER — TELEPHONE (OUTPATIENT)
Dept: INTERNAL MEDICINE | Facility: CLINIC | Age: 49
End: 2019-03-10

## 2019-03-10 DIAGNOSIS — D64.9 ANEMIA, UNSPECIFIED TYPE: Primary | ICD-10-CM

## 2019-03-10 NOTE — LETTER
March 17, 2019    Bernard Olvera  [address]  [facility] [address]             [facility] - Internal Medicine  [address]  [place] [address]  Phone: [phone]  Fax: [phone] Dear Mr. Olvera:    You are still mildly anemic on blood work  My office has been trying to reach Neha Ibarra to schedule repeat blood work for your anemia  Neha please call Dr Patterson's office to schedule repeat blood work        Sincerely,        Elaina Patterson MD

## 2019-03-11 NOTE — TELEPHONE ENCOUNTER
Please notify his  - Neha Ibarra  His blood work for his anemia is ok.  Bernard is still mildly anemic  He needs a repeat blood count in 2-3 weeks - please book CBC

## 2019-03-20 ENCOUNTER — OFFICE VISIT (OUTPATIENT)
Dept: PSYCHIATRY | Facility: CLINIC | Age: 49
End: 2019-03-20
Payer: MEDICAID

## 2019-03-20 VITALS
BODY MASS INDEX: 17.1 KG/M2 | SYSTOLIC BLOOD PRESSURE: 120 MMHG | HEART RATE: 83 BPM | WEIGHT: 126.13 LBS | DIASTOLIC BLOOD PRESSURE: 75 MMHG

## 2019-03-20 DIAGNOSIS — F63.9 IMPULSE CONTROL DISORDER: Primary | ICD-10-CM

## 2019-03-20 DIAGNOSIS — F29 PSYCHOSIS, UNSPECIFIED PSYCHOSIS TYPE: ICD-10-CM

## 2019-03-20 DIAGNOSIS — F73 PROFOUND INTELLECTUAL DISABILITY: ICD-10-CM

## 2019-03-20 DIAGNOSIS — G40.909 SEIZURE DISORDER: ICD-10-CM

## 2019-03-20 PROCEDURE — 99999 PR PBB SHADOW E&M-EST. PATIENT-LVL III: CPT | Mod: PBBFAC,,, | Performed by: PSYCHIATRY & NEUROLOGY

## 2019-03-20 PROCEDURE — 99999 PR PBB SHADOW E&M-EST. PATIENT-LVL III: ICD-10-PCS | Mod: PBBFAC,,, | Performed by: PSYCHIATRY & NEUROLOGY

## 2019-03-20 PROCEDURE — 99213 OFFICE O/P EST LOW 20 MIN: CPT | Mod: PBBFAC | Performed by: PSYCHIATRY & NEUROLOGY

## 2019-03-20 PROCEDURE — 99214 OFFICE O/P EST MOD 30 MIN: CPT | Mod: AF,HB,S$PBB, | Performed by: PSYCHIATRY & NEUROLOGY

## 2019-03-20 PROCEDURE — 99214 PR OFFICE/OUTPT VISIT, EST, LEVL IV, 30-39 MIN: ICD-10-PCS | Mod: AF,HB,S$PBB, | Performed by: PSYCHIATRY & NEUROLOGY

## 2019-03-20 RX ORDER — LORAZEPAM 2 MG/1
2 TABLET ORAL 2 TIMES DAILY
Qty: 60 TABLET | Refills: 5 | Status: SHIPPED | OUTPATIENT
Start: 2019-03-20 | End: 2020-02-06 | Stop reason: SDUPTHER

## 2019-03-20 RX ORDER — LEVOTHYROXINE SODIUM 50 UG/1
1 TABLET ORAL DAILY
COMMUNITY
End: 2019-11-11

## 2019-03-20 RX ORDER — QUETIAPINE FUMARATE 50 MG/1
TABLET, FILM COATED ORAL
Qty: 270 TABLET | Refills: 3 | Status: SHIPPED | OUTPATIENT
Start: 2019-03-20 | End: 2019-04-16

## 2019-03-20 RX ORDER — QUETIAPINE FUMARATE 25 MG/1
50 TABLET, FILM COATED ORAL EVERY 6 HOURS PRN
Qty: 120 TABLET | Refills: 6 | Status: SHIPPED | OUTPATIENT
Start: 2019-03-20 | End: 2020-02-06 | Stop reason: SDUPTHER

## 2019-03-20 RX ORDER — PALIPERIDONE 6 MG/1
TABLET, EXTENDED RELEASE ORAL
Qty: 30 TABLET | Refills: 3 | Status: SHIPPED | OUTPATIENT
Start: 2019-03-20 | End: 2019-04-16

## 2019-03-20 RX ORDER — OLANZAPINE 10 MG/1
10 TABLET, ORALLY DISINTEGRATING ORAL NIGHTLY
Qty: 30 TABLET | Refills: 6 | Status: SHIPPED | OUTPATIENT
Start: 2019-03-20 | End: 2019-10-25 | Stop reason: SDUPTHER

## 2019-03-20 NOTE — PATIENT INSTRUCTIONS
Decrease Invega to 6 mg (from 9 mg) daily.    Discontinue SCHEDULED Seroquel 250 mg at night.    Add Zyprexa Zydis 10 mg at bedtime nightly.    Add Ativan scheduled 2 mg one tablet at 7 AM and one tablet at noon.    Continue Seroquel 50 mg scheduled AND Seroquel 25 mg PRN dosing as currently ordered.    Return to clinic on 4/5/19 at 11 AM for follow up.

## 2019-03-20 NOTE — PROGRESS NOTES
"Outpatient Psychiatry Follow-Up Visit (MD/NP)   03/20/2019    Clinical Status of Patient: Outpatient (Ambulatory)     Session Length:  30 minutes (E&M)      Chief Complaint: Bernard Olvera is a 48 y.o. male who presents today for follow-up of impulsivity, irritibility, mental retardation.   Met with patient and 2 caregivers.     Interval History and Content of Current Session:   General impression: First appointment with me since 03/08/2018.  Resident of [facility] ([facility]).  History of interim events is obtained from [facility], as pt. cannot communicate verbally -- see below.     Target symptoms: Impulsivity, irritibility, voracious appetite, mental retardation.     Interim events:  Pt has NOT been doing well at all.    He has generally NOT been sleeping.  He only sleeps about 4 hours a night of sleep.    He has been very aggravated at times.  He will slap self, or bang his head vs the wall or window.    He compulsively scratches self.    He is extremely hyperactive, all day long.    "He used to sit" -- he has a very hard time sitting still for any length of time.    He gets up several times to go to my bathroom to urinate and run water in the sink.    He will go in other clients' rooms.    He has required PRN meds everyday to try to control agitation.    He does not speak -- he only makes grunting sounds.    He also has not been eating as much and has been losing wt.   I discussed with his caregivers that these Sx may be akathisia due to the Invega.  Discussed trial to taper Invega and substitute another med that can work overall better for pt.    I noted olanzapine could be more calming for pt, help him sleep better at night.  It can also help with wt gain.     He still goes to the day program at [facility].  He stays active during the day.      Review of Systems   · PSYCHIATRIC: Pertinant items are noted in the narrative.  · CONSTITUTIONAL: + significant wt loss "   · MUSCULOSKELETAL: No pain or stiffness of the joints.  · NEUROLOGIC: No weakness, sensory changes, seizures, tremor or other abnormal movements.  · ENDOCRINE: No polydipsia or polyuria.  · INTEGUMENTARY: no rash, no lacerations.  · EYES: No exophthalmos, jaundice or blindness.  · ENT: No dizziness, tinnitus or hearing loss.  · RESPIRATORY: No shortness of breath.  · CARDIOVASCULAR: No tachycardia or chest pain.  · GASTROINTESTINAL: No nausea, vomiting, pain, constipation or diarrhea.  · GENITOURINARY: No frequency, dysuria.         Current Outpatient Medications:     docusate sodium (COLACE) 100 MG capsule, Take 100 mg by mouth 2 (two) times daily., Disp: , Rfl:     guanFACINE 3 mg Tb24, TAKE 1 TABLET BY MOUTH every morning at 8am, Disp: 30 tablet, Rfl: 6    hydrOXYzine HCl (ATARAX) 25 MG tablet, Take 1 tablet (25 mg total) by mouth every 6 (six) hours as needed for Itching or Anxiety., Disp: 120 tablet, Rfl: 6    hydrOXYzine HCl (ATARAX) 25 MG tablet, One tablet mid day, Disp: 30 tablet, Rfl: 3    ketoconazole (NIZORAL) 2 % shampoo, USE TO WASH HAIR TWICE A WEEK AS DIRECTED, Disp: 240 mL, Rfl: 4    lamoTRIgine (LAMICTAL) 100 MG tablet, TAKE 1 TABLET BY MOUTH twice a day (8am/8pm), Disp: 180 tablet, Rfl: 3    levothyroxine (SYNTHROID) 50 MCG tablet, TAKE 1 TABLET BY MOUTH before breakfast daily at 8am, Disp: 90 tablet, Rfl: 3    LORazepam (ATIVAN) 1 MG tablet, Take 1 tablet (1 mg total) by mouth 2 (two) times daily., Disp: 10 tablet, Rfl: 0    multivitamin capsule, Take 1 capsule by mouth once daily., Disp: 30 capsule, Rfl: 11    omega-3 fatty acids-vitamin E 1,000 mg Cap, One tablet maday dixon, Disp: 60 each, Rfl: 11    paliperidone (INVEGA) 9 MG TR24, TAKE 1 TABLET BY MOUTH daily at 8am, Disp: 30 tablet, Rfl: 6    pantoprazole (PROTONIX) 40 MG tablet, TAKE 1 TABLET BY MOUTH once daily at 8am, Disp: 90 tablet, Rfl: 3    polyethylene glycol (GLYCOLAX) 17 gram/dose powder, Take 17 g by mouth once  "daily., Disp: 595 g, Rfl: 3    propranolol (INDERAL LA) 60 MG 24 hr capsule, TAKE 1 CAPSULE BY MOUTH once daily at 8pm, Disp: 90 capsule, Rfl: 3    QUEtiapine (SEROQUEL) 25 MG Tab, Take 2 tablets (50 mg total) by mouth every 6 (six) hours as needed., Disp: 60 tablet, Rfl: 6    QUEtiapine (SEROQUEL) 50 MG tablet, TAKE 1 TABLET BY MOUTH three times a day (8am/noon/5pm), Disp: 270 tablet, Rfl: 3    QUEtiapine (SEROQUEL) 50 MG tablet, Take 250 mg by mouth every night at 8:00 p.m..   Which is 5of the 50 mg tablets by mouth at 8:00 p.m., Disp: 35 tablet, Rfl: 0    ranitidine (ZANTAC) 300 MG tablet, TAKE 1 TABLET BY MOUTH once daily at 8pm, Disp: 90 tablet, Rfl: 3    senna (SENOKOT) 8.6 mg tablet, Take 2 tablets by mouth once daily., Disp: , Rfl:     VITAMIN D3 1,000 unit tablet, TAKE 1 TABLET BY MOUTH EVERY DAY, Disp: 30 tablet, Rfl: 2       Compliance: Yes     Side effects: None     Risk Parameters:   Patient reports no suicidal ideation   Patient reports no homicidal ideation   Patient reports no self-injurious behavior   Patient reports no violent behavior     Patient's Response to Intervention:   The patient's response to intervention is accepting.     Progress Toward Goals and Other Mental Status Changes:   The patient's progress toward goals is limited.     Vitals: Most recent vital signs, dated today just prior to this appointment, were reviewed:       Vitals - 1 value per visit 11/2/2018 1/3/2019 2/12/2019 3/20/2019   SYSTOLIC 122 121 124 120   DIASTOLIC 70 79 62 75   PULSE 89 84 60 83   TEMPERATURE  98     RESPIRATIONS  16     SPO2 97 100 99    Weight (lb) 145.7 150 133.38 126.1   Weight (kg) 66.089 68.04 60.5 57.2   HEIGHT 5' 7" 5' 11" 6' 0"    BODY MASS INDEX 22.82 20.92 18.09 17.1   VISIT REPORT       Pain Score    0        Vitals - 1 value per visit 9/10/2017 10/31/2017 1/8/2018 3/8/2018   SYSTOLIC 129 115 138 121   DIASTOLIC 79 81 71 74   PULSE 85 91 84 78   TEMPERATURE 97.6  97.2    RESPIRATIONS 16  " "18    SPO2 99  98    Weight (lb) 161 161.05 153.88 151.13   Weight (kg) 73.029 73.05 69.8 68.55   HEIGHT 5' 10"  5' 8" 5' 8"   BODY MASS INDEX 23.1 23.11 23.4 22.98   VISIT REPORT       Pain Score   0         Labs:    Lab Visit on 02/12/2019   Component Date Value Ref Range Status    WBC 02/12/2019 6.26  3.90 - 12.70 K/uL Final    RBC 02/12/2019 4.14* 4.60 - 6.20 M/uL Final    Hemoglobin 02/12/2019 11.6* 14.0 - 18.0 g/dL Final    Hematocrit 02/12/2019 37.3* 40.0 - 54.0 % Final    MCV 02/12/2019 90  82 - 98 fL Final    MCH 02/12/2019 28.0  27.0 - 31.0 pg Final    MCHC 02/12/2019 31.1* 32.0 - 36.0 g/dL Final    RDW 02/12/2019 12.9  11.5 - 14.5 % Final    Platelets 02/12/2019 276  150 - 350 K/uL Final    MPV 02/12/2019 9.2  9.2 - 12.9 fL Final    Gran # (ANC) 02/12/2019 4.2  1.8 - 7.7 K/uL Final    Lymph # 02/12/2019 1.1  1.0 - 4.8 K/uL Final    Mono # 02/12/2019 0.6  0.3 - 1.0 K/uL Final    Eos # 02/12/2019 0.3  0.0 - 0.5 K/uL Final    Baso # 02/12/2019 0.02  0.00 - 0.20 K/uL Final    Gran% 02/12/2019 67.6  38.0 - 73.0 % Final    Lymph% 02/12/2019 17.7* 18.0 - 48.0 % Final    Mono% 02/12/2019 9.6  4.0 - 15.0 % Final    Eosinophil% 02/12/2019 4.6  0.0 - 8.0 % Final    Basophil% 02/12/2019 0.3  0.0 - 1.9 % Final    Differential Method 02/12/2019 Automated   Final    Ferritin 02/12/2019 54  20.0 - 300.0 ng/mL Final    Iron 02/12/2019 29* 45 - 160 ug/dL Final    Transferrin 02/12/2019 278  200 - 375 mg/dL Final    TIBC 02/12/2019 411  250 - 450 ug/dL Final    Saturated Iron 02/12/2019 7* 20 - 50 % Final    Vitamin B-12 02/12/2019 867  210 - 950 pg/mL Final    Protein, Serum 02/12/2019 7.1  6.0 - 8.4 g/dL Final    Albumin grams/dl 02/12/2019 4.08  3.35 - 5.55 g/dL Final    Alpha-1 grams/dl 02/12/2019 0.43* 0.17 - 0.41 g/dL Final    Alpha-2 grams/dl 02/12/2019 0.80  0.43 - 0.99 g/dL Final    Beta grams/dl 02/12/2019 0.80  0.50 - 1.10 g/dL Final    Gamma grams/dl 02/12/2019 0.99  0.67 - 1.58 " g/dL Final    Haptoglobin 02/12/2019 213  30 - 250 mg/dL Final    Folate 02/12/2019 8.1  4.0 - 24.0 ng/mL Final    Pathologist Interpretation SPE 02/12/2019 REVIEWED   Final   Lab Visit on 02/08/2019   Component Date Value Ref Range Status    WBC 02/08/2019 6.90  3.90 - 12.70 K/uL Final    RBC 02/08/2019 4.27* 4.60 - 6.20 M/uL Final    Hemoglobin 02/08/2019 11.8* 14.0 - 18.0 g/dL Final    Hematocrit 02/08/2019 38.6* 40.0 - 54.0 % Final    MCV 02/08/2019 90  82 - 98 fL Final    MCH 02/08/2019 27.6  27.0 - 31.0 pg Final    MCHC 02/08/2019 30.6* 32.0 - 36.0 g/dL Final    RDW 02/08/2019 13.0  11.5 - 14.5 % Final    Platelets 02/08/2019 269  150 - 350 K/uL Final    MPV 02/08/2019 9.8  9.2 - 12.9 fL Final    Gran # (ANC) 02/08/2019 4.7  1.8 - 7.7 K/uL Final    Lymph # 02/08/2019 1.2  1.0 - 4.8 K/uL Final    Mono # 02/08/2019 0.7  0.3 - 1.0 K/uL Final    Eos # 02/08/2019 0.3  0.0 - 0.5 K/uL Final    Baso # 02/08/2019 0.03  0.00 - 0.20 K/uL Final    Gran% 02/08/2019 68.7  38.0 - 73.0 % Final    Lymph% 02/08/2019 17.2* 18.0 - 48.0 % Final    Mono% 02/08/2019 9.4  4.0 - 15.0 % Final    Eosinophil% 02/08/2019 4.2  0.0 - 8.0 % Final    Basophil% 02/08/2019 0.4  0.0 - 1.9 % Final    Differential Method 02/08/2019 Automated   Final    Sodium 02/08/2019 140  136 - 145 mmol/L Final    Potassium 02/08/2019 4.0  3.5 - 5.1 mmol/L Final    Chloride 02/08/2019 104  95 - 110 mmol/L Final    CO2 02/08/2019 28  23 - 29 mmol/L Final    Glucose 02/08/2019 84  70 - 110 mg/dL Final    BUN, Bld 02/08/2019 14  6 - 20 mg/dL Final    Creatinine 02/08/2019 0.8  0.5 - 1.4 mg/dL Final    Calcium 02/08/2019 9.4  8.7 - 10.5 mg/dL Final    Total Protein 02/08/2019 6.9  6.0 - 8.4 g/dL Final    Albumin 02/08/2019 3.8  3.5 - 5.2 g/dL Final    Total Bilirubin 02/08/2019 0.4  0.1 - 1.0 mg/dL Final    Alkaline Phosphatase 02/08/2019 77  55 - 135 U/L Final    AST 02/08/2019 13  10 - 40 U/L Final    ALT 02/08/2019 9* 10 -  44 U/L Final    Anion Gap 02/08/2019 8  8 - 16 mmol/L Final    eGFR if African American 02/08/2019 >60  >60 mL/min/1.73 m^2 Final    eGFR if non African American 02/08/2019 >60  >60 mL/min/1.73 m^2 Final    Cholesterol 02/08/2019 133  120 - 199 mg/dL Final    Triglycerides 02/08/2019 56  30 - 150 mg/dL Final    HDL 02/08/2019 40  40 - 75 mg/dL Final    LDL Cholesterol 02/08/2019 81.8  63.0 - 159.0 mg/dL Final    HDL/Chol Ratio 02/08/2019 30.1  20.0 - 50.0 % Final    Total Cholesterol/HDL Ratio 02/08/2019 3.3  2.0 - 5.0 Final    Non-HDL Cholesterol 02/08/2019 93  mg/dL Final    Hemoglobin A1C 02/08/2019 5.1  4.0 - 5.6 % Final    Estimated Avg Glucose 02/08/2019 100  68 - 131 mg/dL Final    TSH 02/08/2019 2.086  0.400 - 4.000 uIU/mL Final   Admission on 01/03/2019, Discharged on 01/03/2019   Component Date Value Ref Range Status    Rapid Influenza A Ag 01/03/2019 Negative  Negative Final    Rapid Influenza B Ag 01/03/2019 Negative  Negative Final     Acceptable 01/03/2019 Yes   Final    Glucose, UA 01/03/2019 Negative*  Final    Bilirubin, UA 01/03/2019 2+*  Final    Ketones, UA 01/03/2019 Trace*  Final    Spec Grav UA 01/03/2019 >=1.030*  Final    Blood, UA 01/03/2019 Negative*  Final    PH, UA 01/03/2019 5.5   Final    Protein, UA 01/03/2019 1+*  Final    Urobilinogen, UA 01/03/2019 1.0  E.U./dL Final    Nitrite, UA 01/03/2019 Negative*  Final    Leukocytes, UA 01/03/2019 Negative*  Final    Color, UA 01/03/2019 Dark yellow   Final    Clarity, UA 01/03/2019 Clear   Final    Albumin, POC 01/03/2019 4.2  3.3 - 5.5 g/dL Final    Alkaline Phosphatase, POC 01/03/2019 82  42 - 141 U/L Final    ALT (SGPT), POC 01/03/2019 13  10 - 47 U/L Final    AST (SGOT), POC 01/03/2019 23  11 - 38 U/L Final    POC BUN 01/03/2019 12  7 - 22 mg/dL Final    Calcium, POC 01/03/2019 9.7  8.0 - 10.3 mg/dL Final    POC Chloride 01/03/2019 101  98 - 108 mmol/L Final    POC Creatinine 01/03/2019  0.8  0.6 - 1.2 mg/dL Final    POC Glucose 01/03/2019 127* 73 - 118 mg/dL Final    POC Potassium 01/03/2019 3.4* 3.6 - 5.1 mmol/L Final    POC Sodium 01/03/2019 143  128 - 145 mmol/L Final    Bilirubin 01/03/2019 0.8  0.2 - 1.6 mg/dL Final    POC TCO2 01/03/2019 26  18 - 33 mmol/L Final    Protein 01/03/2019 7.4  6.4 - 8.1 g/dL Final    POC Cardiac Troponin I 01/03/2019 0.00  <0.09 ng/mL Final    Sample 01/03/2019 UNK   Final    POC B-Type Natriuretic Peptide 01/03/2019 17.0  0.0 - 100.0 pg/mL Final   Lab Visit on 10/23/2018   Component Date Value Ref Range Status    WBC 10/23/2018 5.94  3.90 - 12.70 K/uL Final    RBC 10/23/2018 4.62  4.60 - 6.20 M/uL Final    Hemoglobin 10/23/2018 12.6* 14.0 - 18.0 g/dL Final    Hematocrit 10/23/2018 40.5  40.0 - 54.0 % Final    MCV 10/23/2018 88  82 - 98 fL Final    MCH 10/23/2018 27.3  27.0 - 31.0 pg Final    MCHC 10/23/2018 31.1* 32.0 - 36.0 g/dL Final    RDW 10/23/2018 12.8  11.5 - 14.5 % Final    Platelets 10/23/2018 269  150 - 350 K/uL Final    MPV 10/23/2018 10.2  9.2 - 12.9 fL Final    Gran # (ANC) 10/23/2018 3.5  1.8 - 7.7 K/uL Final    Lymph # 10/23/2018 1.5  1.0 - 4.8 K/uL Final    Mono # 10/23/2018 0.5  0.3 - 1.0 K/uL Final    Eos # 10/23/2018 0.4  0.0 - 0.5 K/uL Final    Baso # 10/23/2018 0.04  0.00 - 0.20 K/uL Final    Gran% 10/23/2018 58.1  38.0 - 73.0 % Final    Lymph% 10/23/2018 24.7  18.0 - 48.0 % Final    Mono% 10/23/2018 8.9  4.0 - 15.0 % Final    Eosinophil% 10/23/2018 7.4  0.0 - 8.0 % Final    Basophil% 10/23/2018 0.7  0.0 - 1.9 % Final    Differential Method 10/23/2018 Automated   Final    Sodium 10/23/2018 138  136 - 145 mmol/L Final    Potassium 10/23/2018 4.4  3.5 - 5.1 mmol/L Final    Chloride 10/23/2018 103  95 - 110 mmol/L Final    CO2 10/23/2018 27  23 - 29 mmol/L Final    Glucose 10/23/2018 100  70 - 110 mg/dL Final    BUN, Bld 10/23/2018 16  6 - 20 mg/dL Final    Creatinine 10/23/2018 0.8  0.5 - 1.4 mg/dL Final     Calcium 10/23/2018 9.8  8.7 - 10.5 mg/dL Final    Total Protein 10/23/2018 7.8  6.0 - 8.4 g/dL Final    Albumin 10/23/2018 4.3  3.5 - 5.2 g/dL Final    Total Bilirubin 10/23/2018 0.6  0.1 - 1.0 mg/dL Final    Alkaline Phosphatase 10/23/2018 109  55 - 135 U/L Final    AST 10/23/2018 18  10 - 40 U/L Final    ALT 10/23/2018 13  10 - 44 U/L Final    Anion Gap 10/23/2018 8  8 - 16 mmol/L Final    eGFR if African American 10/23/2018 >60  >60 mL/min/1.73 m^2 Final    eGFR if non African American 10/23/2018 >60  >60 mL/min/1.73 m^2 Final    Cholesterol 10/23/2018 169  120 - 199 mg/dL Final    Triglycerides 10/23/2018 110  30 - 150 mg/dL Final    HDL 10/23/2018 34* 40 - 75 mg/dL Final    LDL Cholesterol 10/23/2018 113.0  63.0 - 159.0 mg/dL Final    HDL/Chol Ratio 10/23/2018 20.1  20.0 - 50.0 % Final    Total Cholesterol/HDL Ratio 10/23/2018 5.0  2.0 - 5.0 Final    Non-HDL Cholesterol 10/23/2018 135  mg/dL Final    TSH 10/23/2018 3.103  0.400 - 4.000 uIU/mL Final    Vit D, 25-Hydroxy 10/23/2018 47  30 - 96 ng/mL Final    Hemoglobin A1C 10/23/2018 5.1  4.0 - 5.6 % Final    Estimated Avg Glucose 10/23/2018 100  68 - 131 mg/dL Final    Vitamin B-12 10/23/2018 753  210 - 950 pg/mL Final         Mental Status Evaluation      Appearance:  casually dressed, disheveled, overweight (abdominal obesity)    Behavior:  Not fully cooperative, some psychomotor agitation, restlessness and fidgety but redirectable, eye contact minimal, mostly verbally redirectable    Speech:  no intelligible speech; sometimes makes noises, grunts, moans    Mood:  Unable to assess; patient cannot verbally communicate    Affect:  Euthymic to mildly irritible at times, blunted, constricted   Thought Process:  Profound poverty of thought    Thought Content:  normal, no suicidality, no homicidality, delusions, or paranoia    Sensorium:  Unable to assess; patient cannot verbally communicate   Attention Span & Concentration  Poor    Cognition:   severely impaired    Insight:  very poor    Judgment:  very poor      AIMS Screening:  Not done today.    Impression:   Impulse-Control Disorder, unspecified  Intellectual Disability, profound  Unspecified Psychosis -- only for coding purposes for insurance coverage of pt's neuroleptic meds   Suspect akathisia   Seizure Disorder   Hx of Fatty liver with elevated liver enzymes (NOT CODED)    Plan:   Medication management: Decrease Invega to 6 mg (from 9 mg) daily due to suspected akathisia.    Discontinue SCHEDULED Seroquel 250 mg at night.    Add Zyprexa Zydis 10 mg at bedtime nightly.    Add Ativan scheduled 2 mg one tablet at 7 AM and one tablet at noon.    Continue Seroquel 50 mg scheduled AND Seroquel 25 mg PRN dosing as currently ordered.             Additional Notes:  Get fasting labs done as ordered: CMP, Lipid panel, lipase, amylase         Follow up with Dr. Elaina Patterson for medical issues.     Return to Clinic: 6 months, or sooner prn.    AIMS NOT DONE TODAY DUE TO TIME CONSTRAINTS -- WILL DO ON RTC.

## 2019-04-05 ENCOUNTER — OFFICE VISIT (OUTPATIENT)
Dept: PSYCHIATRY | Facility: CLINIC | Age: 49
End: 2019-04-05
Payer: MEDICAID

## 2019-04-05 VITALS
HEIGHT: 72 IN | BODY MASS INDEX: 17.89 KG/M2 | SYSTOLIC BLOOD PRESSURE: 133 MMHG | WEIGHT: 132.06 LBS | HEART RATE: 86 BPM | DIASTOLIC BLOOD PRESSURE: 78 MMHG

## 2019-04-05 DIAGNOSIS — F29 PSYCHOSIS, UNSPECIFIED PSYCHOSIS TYPE: ICD-10-CM

## 2019-04-05 DIAGNOSIS — F73 PROFOUND INTELLECTUAL DISABILITY: Primary | ICD-10-CM

## 2019-04-05 DIAGNOSIS — G25.71 AKATHISIA: ICD-10-CM

## 2019-04-05 DIAGNOSIS — G40.909 SEIZURE DISORDER: ICD-10-CM

## 2019-04-05 DIAGNOSIS — F63.9 IMPULSE CONTROL DISORDER: ICD-10-CM

## 2019-04-05 DIAGNOSIS — R45.1 AGITATION: ICD-10-CM

## 2019-04-05 PROCEDURE — 99999 PR PBB SHADOW E&M-EST. PATIENT-LVL III: ICD-10-PCS | Mod: PBBFAC,,, | Performed by: PSYCHIATRY & NEUROLOGY

## 2019-04-05 PROCEDURE — 99214 OFFICE O/P EST MOD 30 MIN: CPT | Mod: AF,HB,S$PBB, | Performed by: PSYCHIATRY & NEUROLOGY

## 2019-04-05 PROCEDURE — 99214 PR OFFICE/OUTPT VISIT, EST, LEVL IV, 30-39 MIN: ICD-10-PCS | Mod: AF,HB,S$PBB, | Performed by: PSYCHIATRY & NEUROLOGY

## 2019-04-05 PROCEDURE — 99999 PR PBB SHADOW E&M-EST. PATIENT-LVL III: CPT | Mod: PBBFAC,,, | Performed by: PSYCHIATRY & NEUROLOGY

## 2019-04-05 PROCEDURE — 99213 OFFICE O/P EST LOW 20 MIN: CPT | Mod: PBBFAC | Performed by: PSYCHIATRY & NEUROLOGY

## 2019-04-05 RX ORDER — PALIPERIDONE 3 MG/1
3 TABLET, EXTENDED RELEASE ORAL DAILY
Qty: 30 TABLET | Refills: 3 | Status: SHIPPED | OUTPATIENT
Start: 2019-04-05 | End: 2019-08-27 | Stop reason: SDUPTHER

## 2019-04-05 NOTE — PROGRESS NOTES
"AIMS DUE ON RTC.    Outpatient Psychiatry Follow-Up Visit (MD/NP)   04/05/2019    Clinical Status of Patient: Outpatient (Ambulatory)     Session Length:  30 minutes (E&M)      Chief Complaint: Bernard Olvera is a 48 y.o. male who presents today for follow-up of impulsivity, irritibility, mental retardation.   Met with patient and 1 caregiver.     Interval History and Content of Current Session:   General impression:  First appointment with me since 03/20/2019.  Resident of [facility] ([facility]).  History of interim events is obtained from [facility], as pt. cannot communicate verbally -- see below.     Target symptoms: Impulsivity, irritibility, voracious appetite, mental retardation.     Interim events:    Med plan at last appt:  "Decrease Invega to 6 mg (from 9 mg) daily due to suspected akathisia.    Discontinue SCHEDULED Seroquel 250 mg at night.    Add Zyprexa Zydis 10 mg at bedtime nightly.    Add Ativan scheduled 2 mg one tablet at 7 AM and one tablet at noon.    Continue Seroquel 50 mg scheduled AND Seroquel 25 mg PRN dosing as currently ordered."      Pt is MUCH improved!    He is sleeping better.  "It's not perfect, but it's improving".    His appetite is better as well -- eats M&M candy given by his caregiver.  Now, only problem is pt may take someone else's unattended food (this is how he was at baseline).    He is much calmer, no longer agitated -- he does not slap self, scratch self, bang his head as he was doing before.   He has been tolerating the medication as noted above.     He is at the [address] (on [facility]) -- he is NOT with [facility] as before.      They give him all of his meds at 8:30 PM.      He does not speak -- he only makes grunting sounds.      Review of Systems   · PSYCHIATRIC: Pertinant items are noted in the narrative.  · CONSTITUTIONAL: + significant wt loss   · MUSCULOSKELETAL: No pain or stiffness of the joints.  · NEUROLOGIC: No weakness, sensory changes, " seizures, tremor or other abnormal movements.  · ENDOCRINE: No polydipsia or polyuria.  · INTEGUMENTARY: no rash, no lacerations.  · EYES: No exophthalmos, jaundice or blindness.  · ENT: No dizziness, tinnitus or hearing loss.  · RESPIRATORY: No shortness of breath.  · CARDIOVASCULAR: No tachycardia or chest pain.  · GASTROINTESTINAL: No nausea, vomiting, pain, constipation or diarrhea.  · GENITOURINARY: No frequency, dysuria.         Current Outpatient Medications:     docusate sodium (COLACE) 100 MG capsule, Take 100 mg by mouth 2 (two) times daily., Disp: , Rfl:     guanFACINE 3 mg Tb24, TAKE 1 TABLET BY MOUTH every morning at 8am, Disp: 30 tablet, Rfl: 6    hydrOXYzine HCl (ATARAX) 25 MG tablet, Take 1 tablet (25 mg total) by mouth every 6 (six) hours as needed for Itching or Anxiety., Disp: 120 tablet, Rfl: 6    hydrOXYzine HCl (ATARAX) 25 MG tablet, One tablet mid day, Disp: 30 tablet, Rfl: 3    ketoconazole (NIZORAL) 2 % shampoo, USE TO WASH HAIR TWICE A WEEK AS DIRECTED, Disp: 240 mL, Rfl: 4    lamoTRIgine (LAMICTAL) 100 MG tablet, TAKE 1 TABLET BY MOUTH twice a day (8am/8pm), Disp: 180 tablet, Rfl: 3    levothyroxine (SYNTHROID) 50 MCG tablet, TAKE 1 TABLET BY MOUTH before breakfast daily at 8am, Disp: 90 tablet, Rfl: 3    levothyroxine (SYNTHROID) 50 MCG tablet, Take 1 tablet by mouth once daily., Disp: , Rfl:     LORazepam (ATIVAN) 2 MG Tab, Take 1 tablet (2 mg total) by mouth 2 (two) times daily. Take oral one tablet at 7 AM and noon daily, Disp: 60 tablet, Rfl: 5    multivitamin capsule, Take 1 capsule by mouth once daily., Disp: 30 capsule, Rfl: 11    olanzapine zydis (ZYPREXA) 10 MG TbDL, Take 1 tablet (10 mg total) by mouth every evening., Disp: 30 tablet, Rfl: 6    omega-3 fatty acids-vitamin E 1,000 mg Cap, One tablet maday dixon, Disp: 60 each, Rfl: 11    paliperidone (INVEGA) 6 MG TR24, TAKE 1 TABLET BY MOUTH daily at 8am, Disp: 30 tablet, Rfl: 3    pantoprazole (PROTONIX) 40 MG  "tablet, TAKE 1 TABLET BY MOUTH once daily at 8am, Disp: 90 tablet, Rfl: 3    polyethylene glycol (GLYCOLAX) 17 gram/dose powder, Take 17 g by mouth once daily., Disp: 595 g, Rfl: 3    propranolol (INDERAL LA) 60 MG 24 hr capsule, TAKE 1 CAPSULE BY MOUTH once daily at 8pm, Disp: 90 capsule, Rfl: 3    QUEtiapine (SEROQUEL) 25 MG Tab, Take 2 tablets (50 mg total) by mouth every 6 (six) hours as needed., Disp: 120 tablet, Rfl: 6    QUEtiapine (SEROQUEL) 50 MG tablet, TAKE 1 TABLET BY MOUTH three times a day (8am/noon/5pm), Disp: 270 tablet, Rfl: 3    ranitidine (ZANTAC) 300 MG tablet, TAKE 1 TABLET BY MOUTH once daily at 8pm, Disp: 90 tablet, Rfl: 3    senna (SENOKOT) 8.6 mg tablet, Take 2 tablets by mouth once daily., Disp: , Rfl:     VITAMIN D3 1,000 unit tablet, TAKE 1 TABLET BY MOUTH EVERY DAY, Disp: 30 tablet, Rfl: 2       Compliance: Yes     Side effects: None     Risk Parameters:   Patient reports no suicidal ideation   Patient reports no homicidal ideation   Patient reports no self-injurious behavior   Patient reports no violent behavior     Patient's Response to Intervention:   The patient's response to intervention is accepting.     Progress Toward Goals and Other Mental Status Changes:   The patient's progress toward goals is limited.     Vitals: Most recent vital signs, dated today just prior to this appointment, were reviewed:     Vitals - 1 value per visit 1/3/2019 2/12/2019 3/20/2019 4/5/2019   SYSTOLIC 121 124 120 133   DIASTOLIC 79 62 75 78   PULSE 84 60 83 86   TEMPERATURE 98      RESPIRATIONS 16      SPO2 100 99     Weight (lb) 150 133.38 126.1 132.06   Weight (kg) 68.04 60.5 57.2 59.9   HEIGHT 5' 11" 6' 0"  6' 0"   BODY MASS INDEX 20.92 18.09 17.1 17.91   VISIT REPORT       Pain Score   0         Vitals - 1 value per visit 1/8/2018 3/8/2018 5/14/2018 5/17/2018   SYSTOLIC 138 121 126 98   DIASTOLIC 71 74 84 66   PULSE 84 78 76 83   TEMPERATURE 97.2  98.1 98.9   RESPIRATIONS 18  17    SPO2 98  " "98 97   Weight (lb) 153.88 151.13 140 148.81   Weight (kg) 69.8 68.55 63.504 67.5   HEIGHT 5' 8" 5' 8" 5' 7" 5' 7"   BODY MASS INDEX 23.4 22.98 21.93 23.31   VISIT REPORT       Pain Score     0       Labs:    Lab Visit on 02/12/2019   Component Date Value Ref Range Status    WBC 02/12/2019 6.26  3.90 - 12.70 K/uL Final    RBC 02/12/2019 4.14* 4.60 - 6.20 M/uL Final    Hemoglobin 02/12/2019 11.6* 14.0 - 18.0 g/dL Final    Hematocrit 02/12/2019 37.3* 40.0 - 54.0 % Final    MCV 02/12/2019 90  82 - 98 fL Final    MCH 02/12/2019 28.0  27.0 - 31.0 pg Final    MCHC 02/12/2019 31.1* 32.0 - 36.0 g/dL Final    RDW 02/12/2019 12.9  11.5 - 14.5 % Final    Platelets 02/12/2019 276  150 - 350 K/uL Final    MPV 02/12/2019 9.2  9.2 - 12.9 fL Final    Gran # (ANC) 02/12/2019 4.2  1.8 - 7.7 K/uL Final    Lymph # 02/12/2019 1.1  1.0 - 4.8 K/uL Final    Mono # 02/12/2019 0.6  0.3 - 1.0 K/uL Final    Eos # 02/12/2019 0.3  0.0 - 0.5 K/uL Final    Baso # 02/12/2019 0.02  0.00 - 0.20 K/uL Final    Gran% 02/12/2019 67.6  38.0 - 73.0 % Final    Lymph% 02/12/2019 17.7* 18.0 - 48.0 % Final    Mono% 02/12/2019 9.6  4.0 - 15.0 % Final    Eosinophil% 02/12/2019 4.6  0.0 - 8.0 % Final    Basophil% 02/12/2019 0.3  0.0 - 1.9 % Final    Differential Method 02/12/2019 Automated   Final    Ferritin 02/12/2019 54  20.0 - 300.0 ng/mL Final    Iron 02/12/2019 29* 45 - 160 ug/dL Final    Transferrin 02/12/2019 278  200 - 375 mg/dL Final    TIBC 02/12/2019 411  250 - 450 ug/dL Final    Saturated Iron 02/12/2019 7* 20 - 50 % Final    Vitamin B-12 02/12/2019 867  210 - 950 pg/mL Final    Protein, Serum 02/12/2019 7.1  6.0 - 8.4 g/dL Final    Albumin grams/dl 02/12/2019 4.08  3.35 - 5.55 g/dL Final    Alpha-1 grams/dl 02/12/2019 0.43* 0.17 - 0.41 g/dL Final    Alpha-2 grams/dl 02/12/2019 0.80  0.43 - 0.99 g/dL Final    Beta grams/dl 02/12/2019 0.80  0.50 - 1.10 g/dL Final    Gamma grams/dl 02/12/2019 0.99  0.67 - 1.58 g/dL " Final    Haptoglobin 02/12/2019 213  30 - 250 mg/dL Final    Folate 02/12/2019 8.1  4.0 - 24.0 ng/mL Final    Pathologist Interpretation SPE 02/12/2019 REVIEWED   Final   Lab Visit on 02/08/2019   Component Date Value Ref Range Status    WBC 02/08/2019 6.90  3.90 - 12.70 K/uL Final    RBC 02/08/2019 4.27* 4.60 - 6.20 M/uL Final    Hemoglobin 02/08/2019 11.8* 14.0 - 18.0 g/dL Final    Hematocrit 02/08/2019 38.6* 40.0 - 54.0 % Final    MCV 02/08/2019 90  82 - 98 fL Final    MCH 02/08/2019 27.6  27.0 - 31.0 pg Final    MCHC 02/08/2019 30.6* 32.0 - 36.0 g/dL Final    RDW 02/08/2019 13.0  11.5 - 14.5 % Final    Platelets 02/08/2019 269  150 - 350 K/uL Final    MPV 02/08/2019 9.8  9.2 - 12.9 fL Final    Gran # (ANC) 02/08/2019 4.7  1.8 - 7.7 K/uL Final    Lymph # 02/08/2019 1.2  1.0 - 4.8 K/uL Final    Mono # 02/08/2019 0.7  0.3 - 1.0 K/uL Final    Eos # 02/08/2019 0.3  0.0 - 0.5 K/uL Final    Baso # 02/08/2019 0.03  0.00 - 0.20 K/uL Final    Gran% 02/08/2019 68.7  38.0 - 73.0 % Final    Lymph% 02/08/2019 17.2* 18.0 - 48.0 % Final    Mono% 02/08/2019 9.4  4.0 - 15.0 % Final    Eosinophil% 02/08/2019 4.2  0.0 - 8.0 % Final    Basophil% 02/08/2019 0.4  0.0 - 1.9 % Final    Differential Method 02/08/2019 Automated   Final    Sodium 02/08/2019 140  136 - 145 mmol/L Final    Potassium 02/08/2019 4.0  3.5 - 5.1 mmol/L Final    Chloride 02/08/2019 104  95 - 110 mmol/L Final    CO2 02/08/2019 28  23 - 29 mmol/L Final    Glucose 02/08/2019 84  70 - 110 mg/dL Final    BUN, Bld 02/08/2019 14  6 - 20 mg/dL Final    Creatinine 02/08/2019 0.8  0.5 - 1.4 mg/dL Final    Calcium 02/08/2019 9.4  8.7 - 10.5 mg/dL Final    Total Protein 02/08/2019 6.9  6.0 - 8.4 g/dL Final    Albumin 02/08/2019 3.8  3.5 - 5.2 g/dL Final    Total Bilirubin 02/08/2019 0.4  0.1 - 1.0 mg/dL Final    Alkaline Phosphatase 02/08/2019 77  55 - 135 U/L Final    AST 02/08/2019 13  10 - 40 U/L Final    ALT 02/08/2019 9* 10 - 44 U/L  Final    Anion Gap 02/08/2019 8  8 - 16 mmol/L Final    eGFR if African American 02/08/2019 >60  >60 mL/min/1.73 m^2 Final    eGFR if non African American 02/08/2019 >60  >60 mL/min/1.73 m^2 Final    Cholesterol 02/08/2019 133  120 - 199 mg/dL Final    Triglycerides 02/08/2019 56  30 - 150 mg/dL Final    HDL 02/08/2019 40  40 - 75 mg/dL Final    LDL Cholesterol 02/08/2019 81.8  63.0 - 159.0 mg/dL Final    HDL/Chol Ratio 02/08/2019 30.1  20.0 - 50.0 % Final    Total Cholesterol/HDL Ratio 02/08/2019 3.3  2.0 - 5.0 Final    Non-HDL Cholesterol 02/08/2019 93  mg/dL Final    Hemoglobin A1C 02/08/2019 5.1  4.0 - 5.6 % Final    Estimated Avg Glucose 02/08/2019 100  68 - 131 mg/dL Final    TSH 02/08/2019 2.086  0.400 - 4.000 uIU/mL Final   Admission on 01/03/2019, Discharged on 01/03/2019   Component Date Value Ref Range Status    Rapid Influenza A Ag 01/03/2019 Negative  Negative Final    Rapid Influenza B Ag 01/03/2019 Negative  Negative Final     Acceptable 01/03/2019 Yes   Final    Glucose, UA 01/03/2019 Negative*  Final    Bilirubin, UA 01/03/2019 2+*  Final    Ketones, UA 01/03/2019 Trace*  Final    Spec Grav UA 01/03/2019 >=1.030*  Final    Blood, UA 01/03/2019 Negative*  Final    PH, UA 01/03/2019 5.5   Final    Protein, UA 01/03/2019 1+*  Final    Urobilinogen, UA 01/03/2019 1.0  E.U./dL Final    Nitrite, UA 01/03/2019 Negative*  Final    Leukocytes, UA 01/03/2019 Negative*  Final    Color, UA 01/03/2019 Dark yellow   Final    Clarity, UA 01/03/2019 Clear   Final    Albumin, POC 01/03/2019 4.2  3.3 - 5.5 g/dL Final    Alkaline Phosphatase, POC 01/03/2019 82  42 - 141 U/L Final    ALT (SGPT), POC 01/03/2019 13  10 - 47 U/L Final    AST (SGOT), POC 01/03/2019 23  11 - 38 U/L Final    POC BUN 01/03/2019 12  7 - 22 mg/dL Final    Calcium, POC 01/03/2019 9.7  8.0 - 10.3 mg/dL Final    POC Chloride 01/03/2019 101  98 - 108 mmol/L Final    POC Creatinine 01/03/2019 0.8   0.6 - 1.2 mg/dL Final    POC Glucose 01/03/2019 127* 73 - 118 mg/dL Final    POC Potassium 01/03/2019 3.4* 3.6 - 5.1 mmol/L Final    POC Sodium 01/03/2019 143  128 - 145 mmol/L Final    Bilirubin 01/03/2019 0.8  0.2 - 1.6 mg/dL Final    POC TCO2 01/03/2019 26  18 - 33 mmol/L Final    Protein 01/03/2019 7.4  6.4 - 8.1 g/dL Final    POC Cardiac Troponin I 01/03/2019 0.00  <0.09 ng/mL Final    Sample 01/03/2019 UNK   Final    POC B-Type Natriuretic Peptide 01/03/2019 17.0  0.0 - 100.0 pg/mL Final   Lab Visit on 10/23/2018   Component Date Value Ref Range Status    WBC 10/23/2018 5.94  3.90 - 12.70 K/uL Final    RBC 10/23/2018 4.62  4.60 - 6.20 M/uL Final    Hemoglobin 10/23/2018 12.6* 14.0 - 18.0 g/dL Final    Hematocrit 10/23/2018 40.5  40.0 - 54.0 % Final    MCV 10/23/2018 88  82 - 98 fL Final    MCH 10/23/2018 27.3  27.0 - 31.0 pg Final    MCHC 10/23/2018 31.1* 32.0 - 36.0 g/dL Final    RDW 10/23/2018 12.8  11.5 - 14.5 % Final    Platelets 10/23/2018 269  150 - 350 K/uL Final    MPV 10/23/2018 10.2  9.2 - 12.9 fL Final    Gran # (ANC) 10/23/2018 3.5  1.8 - 7.7 K/uL Final    Lymph # 10/23/2018 1.5  1.0 - 4.8 K/uL Final    Mono # 10/23/2018 0.5  0.3 - 1.0 K/uL Final    Eos # 10/23/2018 0.4  0.0 - 0.5 K/uL Final    Baso # 10/23/2018 0.04  0.00 - 0.20 K/uL Final    Gran% 10/23/2018 58.1  38.0 - 73.0 % Final    Lymph% 10/23/2018 24.7  18.0 - 48.0 % Final    Mono% 10/23/2018 8.9  4.0 - 15.0 % Final    Eosinophil% 10/23/2018 7.4  0.0 - 8.0 % Final    Basophil% 10/23/2018 0.7  0.0 - 1.9 % Final    Differential Method 10/23/2018 Automated   Final    Sodium 10/23/2018 138  136 - 145 mmol/L Final    Potassium 10/23/2018 4.4  3.5 - 5.1 mmol/L Final    Chloride 10/23/2018 103  95 - 110 mmol/L Final    CO2 10/23/2018 27  23 - 29 mmol/L Final    Glucose 10/23/2018 100  70 - 110 mg/dL Final    BUN, Bld 10/23/2018 16  6 - 20 mg/dL Final    Creatinine 10/23/2018 0.8  0.5 - 1.4 mg/dL Final     Calcium 10/23/2018 9.8  8.7 - 10.5 mg/dL Final    Total Protein 10/23/2018 7.8  6.0 - 8.4 g/dL Final    Albumin 10/23/2018 4.3  3.5 - 5.2 g/dL Final    Total Bilirubin 10/23/2018 0.6  0.1 - 1.0 mg/dL Final    Alkaline Phosphatase 10/23/2018 109  55 - 135 U/L Final    AST 10/23/2018 18  10 - 40 U/L Final    ALT 10/23/2018 13  10 - 44 U/L Final    Anion Gap 10/23/2018 8  8 - 16 mmol/L Final    eGFR if African American 10/23/2018 >60  >60 mL/min/1.73 m^2 Final    eGFR if non African American 10/23/2018 >60  >60 mL/min/1.73 m^2 Final    Cholesterol 10/23/2018 169  120 - 199 mg/dL Final    Triglycerides 10/23/2018 110  30 - 150 mg/dL Final    HDL 10/23/2018 34* 40 - 75 mg/dL Final    LDL Cholesterol 10/23/2018 113.0  63.0 - 159.0 mg/dL Final    HDL/Chol Ratio 10/23/2018 20.1  20.0 - 50.0 % Final    Total Cholesterol/HDL Ratio 10/23/2018 5.0  2.0 - 5.0 Final    Non-HDL Cholesterol 10/23/2018 135  mg/dL Final    TSH 10/23/2018 3.103  0.400 - 4.000 uIU/mL Final    Vit D, 25-Hydroxy 10/23/2018 47  30 - 96 ng/mL Final    Hemoglobin A1C 10/23/2018 5.1  4.0 - 5.6 % Final    Estimated Avg Glucose 10/23/2018 100  68 - 131 mg/dL Final    Vitamin B-12 10/23/2018 753  210 - 950 pg/mL Final         Mental Status Evaluation      Appearance:  casually dressed, disheveled, overweight (abdominal obesity)    Behavior:  Not fully cooperative, impulsive, but mostly calm and redirectable, eye contact minimal, occasionally grins   Speech:  no intelligible speech; sometimes makes noises: grunts, moans    Mood:  Unable to assess; patient cannot verbally communicate    Affect:  Euthymic to mildly irritible at times, blunted, constricted   Thought Process:  Profound poverty of thought    Thought Content:  normal, no suicidality, no homicidality, delusions, or paranoia    Sensorium:  Unable to assess; patient cannot verbally communicate   Attention Span & Concentration  Poor    Cognition:  severely impaired    Insight:  very  poor    Judgment:  very poor      AIMS Screening:  Not done today.    Impression:   Impulse-Control Disorder, unspecified  Intellectual Disability, profound  Unspecified Psychosis -- only for coding purposes for insurance coverage of pt's neuroleptic meds   Akathisia, improving   Seizure Disorder   Hx of Fatty liver with elevated liver enzymes (NOT CODED)    Plan:  Medication management:   Decrease Invega to 3 mg daily.    Discontinue scheduled Seroquel (50 mg); use Seroquel ONLY PRN as previously ordered.    Continue Zyprexa 10 mg qhs.    Continue all other current medications with refills as noted.           Additional Notes:  Follow up with Dr. Elaina Patterson for medical issues.     Return to Clinic: 6 months, or sooner prn.    AIMS NOT DONE TODAY DUE TO TIME CONSTRAINTS -- WILL DO ON RTC.

## 2019-04-05 NOTE — PATIENT INSTRUCTIONS
Decrease Invega to 3 mg daily.    Discontinue scheduled Seroquel (50 mg); use Seroquel ONLY PRN as previously ordered.    Continue Zyprexa 10 mg qhs.    Continue all other current medications with refills as noted.  Return to clinic on May 10 at 3:30 pm for follow up appt.

## 2019-04-15 PROBLEM — G25.71 AKATHISIA: Status: ACTIVE | Noted: 2019-04-15

## 2019-04-16 ENCOUNTER — OFFICE VISIT (OUTPATIENT)
Dept: INTERNAL MEDICINE | Facility: CLINIC | Age: 49
End: 2019-04-16
Payer: MEDICAID

## 2019-04-16 ENCOUNTER — LAB VISIT (OUTPATIENT)
Dept: LAB | Facility: HOSPITAL | Age: 49
End: 2019-04-16
Attending: INTERNAL MEDICINE
Payer: MEDICAID

## 2019-04-16 VITALS
HEART RATE: 89 BPM | SYSTOLIC BLOOD PRESSURE: 100 MMHG | WEIGHT: 133.19 LBS | HEIGHT: 70 IN | DIASTOLIC BLOOD PRESSURE: 68 MMHG | BODY MASS INDEX: 19.07 KG/M2

## 2019-04-16 DIAGNOSIS — E03.8 OTHER SPECIFIED HYPOTHYROIDISM: ICD-10-CM

## 2019-04-16 DIAGNOSIS — D50.9 IRON DEFICIENCY ANEMIA, UNSPECIFIED IRON DEFICIENCY ANEMIA TYPE: ICD-10-CM

## 2019-04-16 DIAGNOSIS — D64.9 ANEMIA, UNSPECIFIED TYPE: ICD-10-CM

## 2019-04-16 DIAGNOSIS — G40.909 SEIZURE DISORDER: ICD-10-CM

## 2019-04-16 DIAGNOSIS — E78.49 OTHER HYPERLIPIDEMIA: ICD-10-CM

## 2019-04-16 DIAGNOSIS — F73 PROFOUND INTELLECTUAL DISABILITY: Primary | ICD-10-CM

## 2019-04-16 LAB
BASOPHILS # BLD AUTO: 0.06 K/UL (ref 0–0.2)
BASOPHILS NFR BLD: 0.9 % (ref 0–1.9)
DIFFERENTIAL METHOD: ABNORMAL
EOSINOPHIL # BLD AUTO: 0.9 K/UL (ref 0–0.5)
EOSINOPHIL NFR BLD: 13.3 % (ref 0–8)
ERYTHROCYTE [DISTWIDTH] IN BLOOD BY AUTOMATED COUNT: 13.4 % (ref 11.5–14.5)
HCT VFR BLD AUTO: 36.8 % (ref 40–54)
HGB BLD-MCNC: 11.8 G/DL (ref 14–18)
LYMPHOCYTES # BLD AUTO: 1.2 K/UL (ref 1–4.8)
LYMPHOCYTES NFR BLD: 18.6 % (ref 18–48)
MCH RBC QN AUTO: 28.8 PG (ref 27–31)
MCHC RBC AUTO-ENTMCNC: 32.1 G/DL (ref 32–36)
MCV RBC AUTO: 90 FL (ref 82–98)
MONOCYTES # BLD AUTO: 0.9 K/UL (ref 0.3–1)
MONOCYTES NFR BLD: 13 % (ref 4–15)
NEUTROPHILS # BLD AUTO: 3.6 K/UL (ref 1.8–7.7)
NEUTROPHILS NFR BLD: 54 % (ref 38–73)
PLATELET # BLD AUTO: 224 K/UL (ref 150–350)
PMV BLD AUTO: 10 FL (ref 9.2–12.9)
RBC # BLD AUTO: 4.1 M/UL (ref 4.6–6.2)
WBC # BLD AUTO: 6.61 K/UL (ref 3.9–12.7)

## 2019-04-16 PROCEDURE — 99214 OFFICE O/P EST MOD 30 MIN: CPT | Mod: PBBFAC | Performed by: INTERNAL MEDICINE

## 2019-04-16 PROCEDURE — 99214 OFFICE O/P EST MOD 30 MIN: CPT | Mod: S$PBB,,, | Performed by: INTERNAL MEDICINE

## 2019-04-16 PROCEDURE — 99999 PR PBB SHADOW E&M-EST. PATIENT-LVL IV: ICD-10-PCS | Mod: PBBFAC,,, | Performed by: INTERNAL MEDICINE

## 2019-04-16 PROCEDURE — 99214 PR OFFICE/OUTPT VISIT, EST, LEVL IV, 30-39 MIN: ICD-10-PCS | Mod: S$PBB,,, | Performed by: INTERNAL MEDICINE

## 2019-04-16 PROCEDURE — 85025 COMPLETE CBC W/AUTO DIFF WBC: CPT

## 2019-04-16 PROCEDURE — 36415 COLL VENOUS BLD VENIPUNCTURE: CPT

## 2019-04-16 PROCEDURE — 99999 PR PBB SHADOW E&M-EST. PATIENT-LVL IV: CPT | Mod: PBBFAC,,, | Performed by: INTERNAL MEDICINE

## 2019-04-16 NOTE — PROGRESS NOTES
CHIEF COMPLAINT: Follow up seizure disorder, hyperlipidemia, behavior disorder.     HISTORY OF PRESENT ILLNESS: 48-year-old man who presents with  Neha, his  through [address].  He is in the day program at [address] and does an outing once a week.    Since our last visit, Bernard has seen Dr Vides on 3/20/19 and 4/5/19. Invega was decreased to 6 mg daily and  Zyprexa 10 mg at bedtime was added on 3/20/19.  Invega was decreased to 3 mg daily on 4/5/19. His behavior is better. He is no longer in constant motion. He is rarely head banging.  Eating better and has gained 6 pounds.  He sleeps 6-7 hours at night. HE continues to take Intuniv 3 mg daily and  Propranolol LA 60 mg daily,     HE has not had a seizure since our last visit. He had a seizure this last fall after going to the [facility].  He continues to take lamictal 100 mg twice daily for his seizure disorder      He is  on Synthroid 50 mcg daily for hypothryoidism. No apparent fatigue      He has a scaly rash on the left forearm. He tends to pick at that area.      He is on vitamin D 1000 units daily for vitamin D deficiency     He is taking pantoprazole 40 mg in am and ranidtine 300 mg at bedtime.     PAST MEDICAL HISTORY:   1. Moderate to severe mental handicap.   2. Stereotypical movement disorder.   3. Impulse control disorder.   4. Seizure disorder.   5. Right undescended testis removed at age 1.   6. Hyperlipidemia - off meds currently.   7. Transaminiitis  8. Hypothyroidism    SOCIAL HISTORY: He does not smoke, does not drink. He is in supported independent living through [facility].     MEDICATIONS and ALLERGIES: Updated on epic     Review of systems: no fever, chills, visual change, hearing issues, sinus congestion, sore throat, shortness of breath, chest pain, abdominal pain, nausea, voimting, constipation, diarrhea, heartburn, urinary frequency, nocturia, joint pain or muscle pain, rashes, headaches, excessive thrist    PHYSICAL  "EXAMINATION:     /68 (BP Location: Right arm, Patient Position: Sitting, BP Method: Large (Manual))   Pulse (!) 114   Ht 5' 10" (1.778 m)   Wt 60.4 kg (133 lb 2.5 oz)   BMI 19.11 kg/m²     General: Alert, oriented. No apparent distress. Affect within normal   limits. Nonverbal.   Conjunctivae anicteric. Tympanic membranes clear. Oropharynx clear.   Neck supple.   Respiratory effort normal. Lungs clear to auscultation.   Heart: Regular rate and rhythm without murmurs, gallops or rubs.   No lower extremity edema.      ASSESSMENT AND PLAN:     1. WEight loss - getting better with weight change  2. Anemia -cbc today  3. Transaminitis - resolved since he has lost weight  4. Glucose intolerance - resolved since he has lost weight.   5. Seizure disorder - stable on Lamictal.   6. Moderately mentally handicapped with impulse control disorder -follow up with Dr Vides  7. Hypothyroidism - stable  8. Lichen planus vs psoriasis on arm - stable  9. Vitamin D deficiency - on replacement  10. Famiily wants to wait on the colonoscopy  I'll see him back in 3 months, sooner if problems arise.    "

## 2019-04-20 ENCOUNTER — TELEPHONE (OUTPATIENT)
Dept: INTERNAL MEDICINE | Facility: CLINIC | Age: 49
End: 2019-04-20

## 2019-04-20 NOTE — TELEPHONE ENCOUNTER
----- Message from Elaina Patterson MD sent at 4/20/2019  8:36 AM CDT -----  Please notify patricia that his anemia is stable  SF

## 2019-05-10 ENCOUNTER — OFFICE VISIT (OUTPATIENT)
Dept: PSYCHIATRY | Facility: CLINIC | Age: 49
End: 2019-05-10
Payer: MEDICAID

## 2019-05-10 VITALS
HEART RATE: 84 BPM | DIASTOLIC BLOOD PRESSURE: 80 MMHG | WEIGHT: 142.63 LBS | SYSTOLIC BLOOD PRESSURE: 123 MMHG | BODY MASS INDEX: 20.47 KG/M2

## 2019-05-10 DIAGNOSIS — F63.9 IMPULSE CONTROL DISORDER: Primary | ICD-10-CM

## 2019-05-10 DIAGNOSIS — R45.1 AGITATION: ICD-10-CM

## 2019-05-10 DIAGNOSIS — G40.909 SEIZURE DISORDER: ICD-10-CM

## 2019-05-10 DIAGNOSIS — G25.71 AKATHISIA: ICD-10-CM

## 2019-05-10 DIAGNOSIS — F73 PROFOUND INTELLECTUAL DISABILITY: ICD-10-CM

## 2019-05-10 PROCEDURE — 99999 PR PBB SHADOW E&M-EST. PATIENT-LVL III: ICD-10-PCS | Mod: PBBFAC,,, | Performed by: PSYCHIATRY & NEUROLOGY

## 2019-05-10 PROCEDURE — 99213 OFFICE O/P EST LOW 20 MIN: CPT | Mod: PBBFAC | Performed by: PSYCHIATRY & NEUROLOGY

## 2019-05-10 PROCEDURE — 99214 PR OFFICE/OUTPT VISIT, EST, LEVL IV, 30-39 MIN: ICD-10-PCS | Mod: S$PBB,,, | Performed by: PSYCHIATRY & NEUROLOGY

## 2019-05-10 PROCEDURE — 99214 OFFICE O/P EST MOD 30 MIN: CPT | Mod: S$PBB,,, | Performed by: PSYCHIATRY & NEUROLOGY

## 2019-05-10 PROCEDURE — 99999 PR PBB SHADOW E&M-EST. PATIENT-LVL III: CPT | Mod: PBBFAC,,, | Performed by: PSYCHIATRY & NEUROLOGY

## 2019-05-10 RX ORDER — QUETIAPINE FUMARATE 100 MG/1
TABLET, FILM COATED ORAL
Qty: 60 TABLET | Refills: 6 | Status: SHIPPED | OUTPATIENT
Start: 2019-05-10 | End: 2019-11-29 | Stop reason: SDUPTHER

## 2019-05-10 RX ORDER — QUETIAPINE FUMARATE 50 MG/1
TABLET, FILM COATED ORAL
Qty: 30 TABLET | Refills: 6 | Status: SHIPPED | OUTPATIENT
Start: 2019-05-10 | End: 2019-12-30

## 2019-05-10 NOTE — PATIENT INSTRUCTIONS
Increase Seroquel to 100 mg one tablet at 7 am and noon.    Continue Seroquel 50 mg at 7 pm.    Continue all other meds as currently ordered.    Return to clinic on Friday 6/21/19 @ 3:30 pm for follow up appt.

## 2019-05-10 NOTE — PROGRESS NOTES
"AIMS DUE ON RTC.    Outpatient Psychiatry Follow-Up Visit (MD/NP)   05/10/2019    Clinical Status of Patient: Outpatient (Ambulatory)     Session Length:  30 minutes (E&M)      Chief Complaint: Bernard Olvera is a 48 y.o. male who presents today for follow-up of impulsivity, irritibility, mental retardation.   Met with patient and 1 caregiver.     Interval History and Content of Current Session:   General impression:  First appointment with me since 04/05/2019.  Resident of [facility]).  History of interim events is obtained from [facility], as pt. cannot communicate verbally -- see below.     Target symptoms: Impulsivity, irritibility, voracious appetite, mental retardation.     Interim events:    Med plan at last appt:  "Decrease Invega to 3 mg daily.    Discontinue scheduled Seroquel (50 mg); use Seroquel ONLY PRN as previously ordered.    Continue Zyprexa 10 mg qhs.    Continue all other current medications with refills as noted."    He has been hitting his hand against his mouth again.    He also has been screaming out more.      These behaviors ONLY occur during the day, not at night.  They also occur more often when he is around a group of people or when scared (such as a thunderstorm).       RP wonders if he is overly activated by events in the day program.      He again spontaneously gets up off the couch and goes into my bathroom.  He takes some food and opens the package before the [facility] can take it from him.      He has been sleeping well.    His appetite has been good as well; he has gained back some wt.        Pt is still getting the Seroquel 50 mg one tablet TID (these pills are still in the respective wells in his med bubble packs), even though I had recommended the scheduled doses of this med be discontinued at last appt.    He is also currently on Invega 3 mg daily (dose had been reduced from 6 mg daily).    See rest of meds as noted below.         He is at the [facility] [place] (on [address]) -- he is NOT with [facility] as before.      They give him all of his night meds around 8:30 PM.      He does not speak -- he only makes grunting sounds.      Review of Systems   · PSYCHIATRIC: Pertinant items are noted in the narrative.  · CONSTITUTIONAL: + recent significant wt gain.   · MUSCULOSKELETAL: No pain or stiffness of the joints.  · NEUROLOGIC: No weakness, sensory changes, seizures, tremor or other abnormal movements.  · ENDOCRINE: No polydipsia or polyuria.  · INTEGUMENTARY: no rash, no lacerations.  · EYES: No exophthalmos, jaundice or blindness.  · ENT: No dizziness, tinnitus or hearing loss.  · RESPIRATORY: No shortness of breath.  · CARDIOVASCULAR: No tachycardia or chest pain.  · GASTROINTESTINAL: No nausea, vomiting, pain, constipation or diarrhea.  · GENITOURINARY: No frequency, dysuria.         Current Outpatient Medications:     docusate sodium (COLACE) 100 MG capsule, Take 100 mg by mouth 2 (two) times daily., Disp: , Rfl:     guanFACINE 3 mg Tb24, TAKE 1 TABLET BY MOUTH every morning at 8am, Disp: 30 tablet, Rfl: 6    hydrOXYzine HCl (ATARAX) 25 MG tablet, Take 1 tablet (25 mg total) by mouth every 6 (six) hours as needed for Itching or Anxiety., Disp: 120 tablet, Rfl: 6    ketoconazole (NIZORAL) 2 % shampoo, USE TO WASH HAIR TWICE A WEEK AS DIRECTED, Disp: 240 mL, Rfl: 4    lamoTRIgine (LAMICTAL) 100 MG tablet, TAKE 1 TABLET BY MOUTH twice a day (8am/8pm), Disp: 180 tablet, Rfl: 3    levothyroxine (SYNTHROID) 50 MCG tablet, TAKE 1 TABLET BY MOUTH before breakfast daily at 8am, Disp: 90 tablet, Rfl: 3    levothyroxine (SYNTHROID) 50 MCG tablet, Take 1 tablet by mouth once daily., Disp: , Rfl:     LORazepam (ATIVAN) 2 MG Tab, Take 1 tablet (2 mg total) by mouth 2 (two) times daily. Take oral one tablet at 7 AM and noon daily, Disp: 60 tablet, Rfl: 5    multivitamin capsule, Take 1 capsule by mouth once daily., Disp: 30 capsule, Rfl: 11    olanzapine zydis (ZYPREXA)  "10 MG TbDL, Take 1 tablet (10 mg total) by mouth every evening., Disp: 30 tablet, Rfl: 6    omega-3 fatty acids-vitamin E 1,000 mg Cap, One tablet maday dixon, Disp: 60 each, Rfl: 11    paliperidone (INVEGA) 3 MG TR24, Take 1 tablet (3 mg total) by mouth once daily., Disp: 30 tablet, Rfl: 3    pantoprazole (PROTONIX) 40 MG tablet, TAKE 1 TABLET BY MOUTH once daily at 8am, Disp: 90 tablet, Rfl: 3    polyethylene glycol (GLYCOLAX) 17 gram/dose powder, Take 17 g by mouth once daily., Disp: 595 g, Rfl: 3    propranolol (INDERAL LA) 60 MG 24 hr capsule, TAKE 1 CAPSULE BY MOUTH once daily at 8pm, Disp: 90 capsule, Rfl: 3    QUEtiapine (SEROQUEL) 25 MG Tab, Take 2 tablets (50 mg total) by mouth every 6 (six) hours as needed., Disp: 120 tablet, Rfl: 6    ranitidine (ZANTAC) 300 MG tablet, TAKE 1 TABLET BY MOUTH once daily at 8pm, Disp: 90 tablet, Rfl: 3    senna (SENOKOT) 8.6 mg tablet, Take 2 tablets by mouth once daily., Disp: , Rfl:     VITAMIN D3 1,000 unit tablet, TAKE 1 TABLET BY MOUTH EVERY DAY, Disp: 30 tablet, Rfl: 2    See above -- pt is still receiving Seroquel 50 mg one tablet QID scheduled (7 am/noon/7 pm/qhs).    Compliance: Yes     Side effects: None     Risk Parameters:   Patient reports no suicidal ideation   Patient reports no homicidal ideation   Patient reports no self-injurious behavior   Patient reports no violent behavior     Patient's Response to Intervention:   The patient's response to intervention is accepting.     Progress Toward Goals and Other Mental Status Changes:   The patient's progress toward goals is limited.     Vitals: Most recent vital signs, dated today just prior to this appointment, were reviewed:       Vitals - 1 value per visit 3/20/2019 4/5/2019 4/16/2019 5/10/2019   SYSTOLIC 120 133 100 123   DIASTOLIC 75 78 68 80   PULSE 83 86 89 84   TEMPERATURE       RESPIRATIONS       SPO2       Weight (lb) 126.1 132.06 133.16 142.64   Weight (kg) 57.2 59.9 60.4 64.7   HEIGHT  6' 0" " "5' 10"    BODY MASS INDEX 17.1 17.91 19.11 20.47   VISIT REPORT       Pain Score    0        Vitals - 1 value per visit 1/8/2018 3/8/2018 5/14/2018 5/17/2018   SYSTOLIC 138 121 126 98   DIASTOLIC 71 74 84 66   PULSE 84 78 76 83   TEMPERATURE 97.2  98.1 98.9   RESPIRATIONS 18  17    SPO2 98  98 97   Weight (lb) 153.88 151.13 140 148.81   Weight (kg) 69.8 68.55 63.504 67.5   HEIGHT 5' 8" 5' 8" 5' 7" 5' 7"   BODY MASS INDEX 23.4 22.98 21.93 23.31   VISIT REPORT       Pain Score     0       Labs:    Lab Visit on 04/16/2019   Component Date Value Ref Range Status    WBC 04/16/2019 6.61  3.90 - 12.70 K/uL Final    RBC 04/16/2019 4.10* 4.60 - 6.20 M/uL Final    Hemoglobin 04/16/2019 11.8* 14.0 - 18.0 g/dL Final    Hematocrit 04/16/2019 36.8* 40.0 - 54.0 % Final    Mean Corpuscular Volume 04/16/2019 90  82 - 98 fL Final    Mean Corpuscular Hemoglobin 04/16/2019 28.8  27.0 - 31.0 pg Final    Mean Corpuscular Hemoglobin Conc 04/16/2019 32.1  32.0 - 36.0 g/dL Final    RDW 04/16/2019 13.4  11.5 - 14.5 % Final    Platelets 04/16/2019 224  150 - 350 K/uL Final    MPV 04/16/2019 10.0  9.2 - 12.9 fL Final    Gran # (ANC) 04/16/2019 3.6  1.8 - 7.7 K/uL Final    Lymph # 04/16/2019 1.2  1.0 - 4.8 K/uL Final    Mono # 04/16/2019 0.9  0.3 - 1.0 K/uL Final    Eos # 04/16/2019 0.9* 0.0 - 0.5 K/uL Final    Baso # 04/16/2019 0.06  0.00 - 0.20 K/uL Final    Gran% 04/16/2019 54.0  38.0 - 73.0 % Final    Lymph% 04/16/2019 18.6  18.0 - 48.0 % Final    Mono% 04/16/2019 13.0  4.0 - 15.0 % Final    Eosinophil% 04/16/2019 13.3* 0.0 - 8.0 % Final    Basophil% 04/16/2019 0.9  0.0 - 1.9 % Final    Differential Method 04/16/2019 Automated   Final   Lab Visit on 02/12/2019   Component Date Value Ref Range Status    WBC 02/12/2019 6.26  3.90 - 12.70 K/uL Final    RBC 02/12/2019 4.14* 4.60 - 6.20 M/uL Final    Hemoglobin 02/12/2019 11.6* 14.0 - 18.0 g/dL Final    Hematocrit 02/12/2019 37.3* 40.0 - 54.0 % Final    Mean Corpuscular " Volume 02/12/2019 90  82 - 98 fL Final    Mean Corpuscular Hemoglobin 02/12/2019 28.0  27.0 - 31.0 pg Final    Mean Corpuscular Hemoglobin Conc 02/12/2019 31.1* 32.0 - 36.0 g/dL Final    RDW 02/12/2019 12.9  11.5 - 14.5 % Final    Platelets 02/12/2019 276  150 - 350 K/uL Final    MPV 02/12/2019 9.2  9.2 - 12.9 fL Final    Gran # (ANC) 02/12/2019 4.2  1.8 - 7.7 K/uL Final    Lymph # 02/12/2019 1.1  1.0 - 4.8 K/uL Final    Mono # 02/12/2019 0.6  0.3 - 1.0 K/uL Final    Eos # 02/12/2019 0.3  0.0 - 0.5 K/uL Final    Baso # 02/12/2019 0.02  0.00 - 0.20 K/uL Final    Gran% 02/12/2019 67.6  38.0 - 73.0 % Final    Lymph% 02/12/2019 17.7* 18.0 - 48.0 % Final    Mono% 02/12/2019 9.6  4.0 - 15.0 % Final    Eosinophil% 02/12/2019 4.6  0.0 - 8.0 % Final    Basophil% 02/12/2019 0.3  0.0 - 1.9 % Final    Differential Method 02/12/2019 Automated   Final    Ferritin 02/12/2019 54  20.0 - 300.0 ng/mL Final    Iron 02/12/2019 29* 45 - 160 ug/dL Final    Transferrin 02/12/2019 278  200 - 375 mg/dL Final    TIBC 02/12/2019 411  250 - 450 ug/dL Final    Saturated Iron 02/12/2019 7* 20 - 50 % Final    Vitamin B-12 02/12/2019 867  210 - 950 pg/mL Final    Protein, Serum 02/12/2019 7.1  6.0 - 8.4 g/dL Final    Albumin grams/dl 02/12/2019 4.08  3.35 - 5.55 g/dL Final    Alpha-1 grams/dl 02/12/2019 0.43* 0.17 - 0.41 g/dL Final    Alpha-2 grams/dl 02/12/2019 0.80  0.43 - 0.99 g/dL Final    Beta grams/dl 02/12/2019 0.80  0.50 - 1.10 g/dL Final    Gamma grams/dl 02/12/2019 0.99  0.67 - 1.58 g/dL Final    Haptoglobin 02/12/2019 213  30 - 250 mg/dL Final    Folate 02/12/2019 8.1  4.0 - 24.0 ng/mL Final    Pathologist Interpretation SPE 02/12/2019 REVIEWED   Final   Lab Visit on 02/08/2019   Component Date Value Ref Range Status    WBC 02/08/2019 6.90  3.90 - 12.70 K/uL Final    RBC 02/08/2019 4.27* 4.60 - 6.20 M/uL Final    Hemoglobin 02/08/2019 11.8* 14.0 - 18.0 g/dL Final    Hematocrit 02/08/2019 38.6* 40.0 -  54.0 % Final    Mean Corpuscular Volume 02/08/2019 90  82 - 98 fL Final    Mean Corpuscular Hemoglobin 02/08/2019 27.6  27.0 - 31.0 pg Final    Mean Corpuscular Hemoglobin Conc 02/08/2019 30.6* 32.0 - 36.0 g/dL Final    RDW 02/08/2019 13.0  11.5 - 14.5 % Final    Platelets 02/08/2019 269  150 - 350 K/uL Final    MPV 02/08/2019 9.8  9.2 - 12.9 fL Final    Gran # (ANC) 02/08/2019 4.7  1.8 - 7.7 K/uL Final    Lymph # 02/08/2019 1.2  1.0 - 4.8 K/uL Final    Mono # 02/08/2019 0.7  0.3 - 1.0 K/uL Final    Eos # 02/08/2019 0.3  0.0 - 0.5 K/uL Final    Baso # 02/08/2019 0.03  0.00 - 0.20 K/uL Final    Gran% 02/08/2019 68.7  38.0 - 73.0 % Final    Lymph% 02/08/2019 17.2* 18.0 - 48.0 % Final    Mono% 02/08/2019 9.4  4.0 - 15.0 % Final    Eosinophil% 02/08/2019 4.2  0.0 - 8.0 % Final    Basophil% 02/08/2019 0.4  0.0 - 1.9 % Final    Differential Method 02/08/2019 Automated   Final    Sodium 02/08/2019 140  136 - 145 mmol/L Final    Potassium 02/08/2019 4.0  3.5 - 5.1 mmol/L Final    Chloride 02/08/2019 104  95 - 110 mmol/L Final    CO2 02/08/2019 28  23 - 29 mmol/L Final    Glucose 02/08/2019 84  70 - 110 mg/dL Final    BUN, Bld 02/08/2019 14  6 - 20 mg/dL Final    Creatinine 02/08/2019 0.8  0.5 - 1.4 mg/dL Final    Calcium 02/08/2019 9.4  8.7 - 10.5 mg/dL Final    Total Protein 02/08/2019 6.9  6.0 - 8.4 g/dL Final    Albumin 02/08/2019 3.8  3.5 - 5.2 g/dL Final    Total Bilirubin 02/08/2019 0.4  0.1 - 1.0 mg/dL Final    Alkaline Phosphatase 02/08/2019 77  55 - 135 U/L Final    AST 02/08/2019 13  10 - 40 U/L Final    ALT 02/08/2019 9* 10 - 44 U/L Final    Anion Gap 02/08/2019 8  8 - 16 mmol/L Final    eGFR if African American 02/08/2019 >60  >60 mL/min/1.73 m^2 Final    eGFR if non African American 02/08/2019 >60  >60 mL/min/1.73 m^2 Final    Cholesterol 02/08/2019 133  120 - 199 mg/dL Final    Triglycerides 02/08/2019 56  30 - 150 mg/dL Final    HDL 02/08/2019 40  40 - 75 mg/dL Final     LDL Cholesterol 02/08/2019 81.8  63.0 - 159.0 mg/dL Final    Hdl/Cholesterol Ratio 02/08/2019 30.1  20.0 - 50.0 % Final    Total Cholesterol/HDL Ratio 02/08/2019 3.3  2.0 - 5.0 Final    Non-HDL Cholesterol 02/08/2019 93  mg/dL Final    Hemoglobin A1C 02/08/2019 5.1  4.0 - 5.6 % Final    Estimated Avg Glucose 02/08/2019 100  68 - 131 mg/dL Final    TSH 02/08/2019 2.086  0.400 - 4.000 uIU/mL Final   Admission on 01/03/2019, Discharged on 01/03/2019   Component Date Value Ref Range Status    Rapid Influenza A Ag 01/03/2019 Negative  Negative Final    Rapid Influenza B Ag 01/03/2019 Negative  Negative Final     Acceptable 01/03/2019 Yes   Final    Glucose, UA 01/03/2019 Negative*  Final    Bilirubin, UA 01/03/2019 2+*  Final    Ketones, UA 01/03/2019 Trace*  Final    Spec Grav UA 01/03/2019 >=1.030*  Final    Blood, UA 01/03/2019 Negative*  Final    PH, UA 01/03/2019 5.5   Final    Protein, UA 01/03/2019 1+*  Final    Urobilinogen, UA 01/03/2019 1.0  E.U./dL Final    Nitrite, UA 01/03/2019 Negative*  Final    Leukocytes, UA 01/03/2019 Negative*  Final    Color, UA 01/03/2019 Dark yellow   Final    Clarity, UA 01/03/2019 Clear   Final    Albumin, POC 01/03/2019 4.2  3.3 - 5.5 g/dL Final    Alkaline Phosphatase, POC 01/03/2019 82  42 - 141 U/L Final    ALT (SGPT), POC 01/03/2019 13  10 - 47 U/L Final    AST (SGOT), POC 01/03/2019 23  11 - 38 U/L Final    POC BUN 01/03/2019 12  7 - 22 mg/dL Final    Calcium, POC 01/03/2019 9.7  8.0 - 10.3 mg/dL Final    POC Chloride 01/03/2019 101  98 - 108 mmol/L Final    POC Creatinine 01/03/2019 0.8  0.6 - 1.2 mg/dL Final    POC Glucose 01/03/2019 127* 73 - 118 mg/dL Final    POC Potassium 01/03/2019 3.4* 3.6 - 5.1 mmol/L Final    POC Sodium 01/03/2019 143  128 - 145 mmol/L Final    Bilirubin 01/03/2019 0.8  0.2 - 1.6 mg/dL Final    POC TCO2 01/03/2019 26  18 - 33 mmol/L Final    Protein 01/03/2019 7.4  6.4 - 8.1 g/dL Final    POC  Cardiac Troponin I 01/03/2019 0.00  <0.09 ng/mL Final    Sample 01/03/2019 UNK   Final    POC B-Type Natriuretic Peptide 01/03/2019 17.0  0.0 - 100.0 pg/mL Final   Lab Visit on 10/23/2018   Component Date Value Ref Range Status    WBC 10/23/2018 5.94  3.90 - 12.70 K/uL Final    RBC 10/23/2018 4.62  4.60 - 6.20 M/uL Final    Hemoglobin 10/23/2018 12.6* 14.0 - 18.0 g/dL Final    Hematocrit 10/23/2018 40.5  40.0 - 54.0 % Final    Mean Corpuscular Volume 10/23/2018 88  82 - 98 fL Final    Mean Corpuscular Hemoglobin 10/23/2018 27.3  27.0 - 31.0 pg Final    Mean Corpuscular Hemoglobin Conc 10/23/2018 31.1* 32.0 - 36.0 g/dL Final    RDW 10/23/2018 12.8  11.5 - 14.5 % Final    Platelets 10/23/2018 269  150 - 350 K/uL Final    MPV 10/23/2018 10.2  9.2 - 12.9 fL Final    Gran # (ANC) 10/23/2018 3.5  1.8 - 7.7 K/uL Final    Lymph # 10/23/2018 1.5  1.0 - 4.8 K/uL Final    Mono # 10/23/2018 0.5  0.3 - 1.0 K/uL Final    Eos # 10/23/2018 0.4  0.0 - 0.5 K/uL Final    Baso # 10/23/2018 0.04  0.00 - 0.20 K/uL Final    Gran% 10/23/2018 58.1  38.0 - 73.0 % Final    Lymph% 10/23/2018 24.7  18.0 - 48.0 % Final    Mono% 10/23/2018 8.9  4.0 - 15.0 % Final    Eosinophil% 10/23/2018 7.4  0.0 - 8.0 % Final    Basophil% 10/23/2018 0.7  0.0 - 1.9 % Final    Differential Method 10/23/2018 Automated   Final    Sodium 10/23/2018 138  136 - 145 mmol/L Final    Potassium 10/23/2018 4.4  3.5 - 5.1 mmol/L Final    Chloride 10/23/2018 103  95 - 110 mmol/L Final    CO2 10/23/2018 27  23 - 29 mmol/L Final    Glucose 10/23/2018 100  70 - 110 mg/dL Final    BUN, Bld 10/23/2018 16  6 - 20 mg/dL Final    Creatinine 10/23/2018 0.8  0.5 - 1.4 mg/dL Final    Calcium 10/23/2018 9.8  8.7 - 10.5 mg/dL Final    Total Protein 10/23/2018 7.8  6.0 - 8.4 g/dL Final    Albumin 10/23/2018 4.3  3.5 - 5.2 g/dL Final    Total Bilirubin 10/23/2018 0.6  0.1 - 1.0 mg/dL Final    Alkaline Phosphatase 10/23/2018 109  55 - 135 U/L Final    AST  10/23/2018 18  10 - 40 U/L Final    ALT 10/23/2018 13  10 - 44 U/L Final    Anion Gap 10/23/2018 8  8 - 16 mmol/L Final    eGFR if African American 10/23/2018 >60  >60 mL/min/1.73 m^2 Final    eGFR if non African American 10/23/2018 >60  >60 mL/min/1.73 m^2 Final    Cholesterol 10/23/2018 169  120 - 199 mg/dL Final    Triglycerides 10/23/2018 110  30 - 150 mg/dL Final    HDL 10/23/2018 34* 40 - 75 mg/dL Final    LDL Cholesterol 10/23/2018 113.0  63.0 - 159.0 mg/dL Final    Hdl/Cholesterol Ratio 10/23/2018 20.1  20.0 - 50.0 % Final    Total Cholesterol/HDL Ratio 10/23/2018 5.0  2.0 - 5.0 Final    Non-HDL Cholesterol 10/23/2018 135  mg/dL Final    TSH 10/23/2018 3.103  0.400 - 4.000 uIU/mL Final    Vit D, 25-Hydroxy 10/23/2018 47  30 - 96 ng/mL Final    Hemoglobin A1C 10/23/2018 5.1  4.0 - 5.6 % Final    Estimated Avg Glucose 10/23/2018 100  68 - 131 mg/dL Final    Vitamin B-12 10/23/2018 753  210 - 950 pg/mL Final         Mental Status Evaluation      Appearance:  casually dressed, disheveled   Behavior:  Not fully cooperative, impulsive, but mostly calm and redirectable, eye contact minimal, occasionally grins   Speech:  no intelligible speech; sometimes makes noises: grunts, moans    Mood:  Unable to assess; patient cannot verbally communicate    Affect:  Euthymic to mildly irritible at times, blunted, constricted   Thought Process:  Profound poverty of thought    Thought Content:  normal, no suicidality, no homicidality, delusions, or paranoia    Sensorium:  Unable to assess; patient cannot verbally communicate   Attention Span & Concentration  Poor    Cognition:  severely impaired    Insight:  very poor    Judgment:  very poor      AIMS Screening:  Not done today.    Impression:   Impulse-Control Disorder, unspecified  Intellectual Disability, profound  Unspecified Psychosis -- only for coding purposes for insurance coverage of pt's neuroleptic meds   Akathisia, improving   Seizure Disorder   Hx  of Fatty liver with elevated liver enzymes (NOT CODED)    Plan:  Medication management:  Increase SCHEDULED Seroquel to 100 mg at 7 am and 100 mg at noon; continue 50 mg x 2 doses in evening like current dosing.    Continue Invega 3 mg daily.    Continue Zyprexa 10 mg qhs.    Continue PRN Seroquel as noted above.    Continue all other current medications with refills as noted.           Additional Notes:  Follow up with Dr. Elaina Patterson for medical issues.     Return to Clinic: 6 months, or sooner prn.    AIMS NOT DONE TODAY DUE TO TIME CONSTRAINTS -- WILL DO ON RTC.

## 2019-06-25 ENCOUNTER — HOSPITAL ENCOUNTER (EMERGENCY)
Facility: HOSPITAL | Age: 49
Discharge: HOME | End: 2019-06-25
Attending: EMERGENCY MEDICINE
Payer: MEDICAID

## 2019-06-25 VITALS
OXYGEN SATURATION: 100 % | WEIGHT: 140 LBS | RESPIRATION RATE: 16 BRPM | DIASTOLIC BLOOD PRESSURE: 87 MMHG | SYSTOLIC BLOOD PRESSURE: 138 MMHG | HEIGHT: 70 IN | HEART RATE: 100 BPM | TEMPERATURE: 98 F | BODY MASS INDEX: 20.04 KG/M2

## 2019-06-25 DIAGNOSIS — S02.2XXB OPEN FRACTURE OF NASAL BONE, INITIAL ENCOUNTER: Primary | ICD-10-CM

## 2019-06-25 DIAGNOSIS — R04.0 NOSEBLEED: ICD-10-CM

## 2019-06-25 DIAGNOSIS — S01.21XA LACERATION OF NOSE, INITIAL ENCOUNTER: ICD-10-CM

## 2019-06-25 DIAGNOSIS — R22.0 SWELLING OF NOSE: ICD-10-CM
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
## 2019-06-25 PROCEDURE — 25000003 PHARM REV CODE 250: Mod: ER | Performed by: EMERGENCY MEDICINE

## 2019-06-25 PROCEDURE — 12011 RPR F/E/E/N/L/M 2.5 CM/<: CPT | Mod: ER

## 2019-06-25 PROCEDURE — 96372 THER/PROPH/DIAG INJ SC/IM: CPT | Mod: 59,ER

## 2019-06-25 PROCEDURE — 63600175 PHARM REV CODE 636 W HCPCS: Mod: ER | Performed by: EMERGENCY MEDICINE

## 2019-06-25 PROCEDURE — 99284 EMERGENCY DEPT VISIT MOD MDM: CPT | Mod: 25,ER

## 2019-06-25 RX ORDER — IBUPROFEN 600 MG/1
600 TABLET ORAL EVERY 6 HOURS PRN
Qty: 20 TABLET | Refills: 0 | Status: SHIPPED | OUTPATIENT
Start: 2019-06-25 | End: 2021-07-20

## 2019-06-25 RX ORDER — TRAMADOL HYDROCHLORIDE 50 MG/1
50 TABLET ORAL EVERY 6 HOURS PRN
Qty: 12 TABLET | Refills: 0 | Status: SHIPPED | OUTPATIENT
Start: 2019-06-25 | End: 2019-07-05

## 2019-06-25 RX ORDER — CEPHALEXIN 250 MG/1
250 CAPSULE ORAL 4 TIMES DAILY
Qty: 28 CAPSULE | Refills: 0 | Status: SHIPPED | OUTPATIENT
Start: 2019-06-25 | End: 2019-07-02

## 2019-06-25 RX ORDER — ZIPRASIDONE MESYLATE 20 MG/ML
20 INJECTION, POWDER, LYOPHILIZED, FOR SOLUTION INTRAMUSCULAR
Status: COMPLETED | OUTPATIENT
Start: 2019-06-25 | End: 2019-06-25

## 2019-06-25 RX ORDER — DIAZEPAM 5 MG/1
10 TABLET ORAL
Status: COMPLETED | OUTPATIENT
Start: 2019-06-25 | End: 2019-06-25

## 2019-06-25 RX ORDER — LORAZEPAM 2 MG/ML
2 INJECTION INTRAMUSCULAR
Status: COMPLETED | OUTPATIENT
Start: 2019-06-25 | End: 2019-06-25

## 2019-06-25 RX ADMIN — ZIPRASIDONE MESYLATE 20 MG: 20 INJECTION, POWDER, LYOPHILIZED, FOR SOLUTION INTRAMUSCULAR at 05:06

## 2019-06-25 RX ADMIN — LORAZEPAM 2 MG: 2 INJECTION INTRAMUSCULAR; INTRAVENOUS at 05:06

## 2019-06-25 RX ADMIN — DIAZEPAM 10 MG: 5 TABLET ORAL at 06:06

## 2019-06-25 NOTE — ED PROVIDER NOTES
"Encounter Date: 6/25/2019    SCRIBE #1 NOTE: I, Bobo Briggs, am scribing for, and in the presence of,  Dr. Schulte. I have scribed the following portions of the note - Other sections scribed: HPI, ROS, PE.       History     Chief Complaint   Patient presents with    Fall     Care giver states," He had a fall and we want to have his nose checked out."     Bernard Olvera is a 48 y.o. male with mental retardation presents to the ED with caregiver complaing of nose laceration after fall. The patient fell head first today at 1300 at [facility] . The pt nose was bleeding, but now stopped. The paramedics came and evaluated him but caregiver wanted to have his nose further evaluated. Tetanus vaccination UTD. No confusion, LOC, or vomiting. Per caregiver states he is acting his normal self.    The history is provided by a caregiver. The history is limited by a developmental delay. No  was used.     Review of patient's allergies indicates:   Allergen Reactions    Azithromycin      Past Medical History:   Diagnosis Date    Behavioral problem     Glucose intolerance (impaired glucose tolerance) 11/20/2013    History of psychiatric care     Hyperlipidemia 11/28/2012    Hypothyroidism 11/28/2012    Insulin resistance 3/27/2014    Liver disease     fatty infiltration, prior Hepatitis B exposure    Mental retardation     Moderate mental retardation 11/28/2012    Personal history of seizure disorder     Profound mental retardation in adult     Psychiatric problem     Seizure disorder 11/28/2012    Self-injurious behavior     Therapy      Past Surgical History:   Procedure Laterality Date    HERNIA REPAIR       Family History   Family history unknown: Yes     Social History     Tobacco Use    Smoking status: Never Smoker    Smokeless tobacco: Never Used   Substance Use Topics    Alcohol use: No    Drug use: No     Review of Systems   Unable to perform ROS: Other (Mental " retardation )   Constitutional: Negative for activity change and fever.   HENT: Positive for nosebleeds.    Gastrointestinal: Negative for vomiting.   Skin: Positive for wound.   Neurological: Negative for syncope.        Negative Loss of consciousness.   Psychiatric/Behavioral: Negative for confusion.       Physical Exam     Initial Vitals [06/25/19 1555]   BP Pulse Resp Temp SpO2   117/78 100 16 98 °F (36.7 °C) 100 %      MAP       --         Physical Exam    Nursing note and vitals reviewed.  Constitutional: He appears well-developed and well-nourished.   HENT:   Head: Normocephalic.   Nose: Nose lacerations present. No sinus tenderness or nasal septal hematoma.       1.5 cm superficial laceration. No swelling or tenderness to nose. Dried blood in left nare.    Eyes: Conjunctivae and EOM are normal. Pupils are equal, round, and reactive to light.   Neck: Normal range of motion. Neck supple.   Cardiovascular: Normal rate and intact distal pulses.   Pulmonary/Chest: No respiratory distress.   Musculoskeletal: Normal range of motion.   Neurological: He is alert.   Pt is at neurological base line.   Skin: Skin is warm and dry.         ED Course   Lac Repair  Date/Time: 6/25/2019 7:00 PM  Performed by: Allie Schulte MD  Authorized by: Allie Schulte MD   Body area: head/neck  Location details: nose  Laceration length: 1.5 cm  Foreign bodies: no foreign bodies  Vascular damage: no  Skin closure: glue  Patient tolerance: Patient tolerated the procedure well with no immediate complications        Labs Reviewed - No data to display       Imaging Results          X-Ray Nasal Bones (Final result)  Result time 06/25/19 19:05:13    Final result by Randall Knott MD (06/25/19 19:05:13)                 Impression:      1. Depressed nasal bone fracture.      Electronically signed by: Randall Knott MD  Date:    06/25/2019  Time:    19:05             Narrative:    EXAMINATION:  XR NASAL BONES    CLINICAL  HISTORY:  Localized swelling, mass and lump, head    COMPARISON:  None    FINDINGS:  Three views.    The paranasal sinuses and mastoid air cells are grossly clear.  There is a depressed nasal bone fracture.  The remainder of the maxillofacial ossifications truck is appear grossly intact.                                 Medical Decision Making:   Initial Assessment:   This is a 48 y.o. male who presents to the ED with caregiver complaining of with caregiver complaing of nose laceration after fall.  Caregiver denies confusion and vomiting. Physical exam significant for dry blood to the left Nare- no active bleeding    Clinical Tests:   Radiological Study: Ordered and Reviewed  ED Management:  Will order Maxillofacial Without Contrast and CT Head Without Contrast.   Will treat with Lorazepam and ziprasidone.   1845- patient will be given Valium.  Two attempts have been made to get a CT of his face and neck.  Care will be turned over to Dr. Alfaro at 7:00 p.m.  Will attempt x-ray of the nasal bones. Patient has been at baseline for several hours according to his caregiver.  X-ray did show a depressed nasal fracture.  Patient was discharged home on Keflex and referred to ENT.            Scribe Attestation:   Scribe #1: I performed the above scribed service and the documentation accurately describes the services I performed. I attest to the accuracy of the note.           I, Dr. Allie Schulte, personally performed the services described in this documentation. All medical record entries made by the scribe were at my direction and in my presence.  I have reviewed the chart and agree that the record reflects my personal performance and is accurate and complete. Allie Schulte MD.  6:43 PM 06/29/2019          Clinical Impression:     1. Open fracture of nasal bone, initial encounter    2. Swelling of nose    3. Fall    4. Laceration of nose, initial encounter    5. Nosebleed                                   Allie SINGH  MD [place]  06/25/19 1840       Allie Schulte MD  06/29/19 [id number]

## 2019-06-26 NOTE — ED PROVIDER NOTES
"Encounter Date: 6/25/2019    SCRIBE #1 NOTE: I, Bela Lau, am scribing for, and in the presence of,  Dr. Alfaro. I have scribed the following portions of the note - Other sections scribed: Transition/[place] of care note.       History     Chief Complaint   Patient presents with    Fall     Care giver states," He had a fall and we want to have his nose checked out."     HPI  Review of patient's allergies indicates:   Allergen Reactions    Azithromycin      Past Medical History:   Diagnosis Date    Behavioral problem     Glucose intolerance (impaired glucose tolerance) 11/20/2013    History of psychiatric care     Hyperlipidemia 11/28/2012    Hypothyroidism 11/28/2012    Insulin resistance 3/27/2014    Liver disease     fatty infiltration, prior Hepatitis B exposure    Mental retardation     Moderate mental retardation 11/28/2012    Personal history of seizure disorder     Profound mental retardation in adult     Psychiatric problem     Seizure disorder 11/28/2012    Self-injurious behavior     Therapy      Past Surgical History:   Procedure Laterality Date    HERNIA REPAIR       Family History   Family history unknown: Yes     Social History     Tobacco Use    Smoking status: Never Smoker    Smokeless tobacco: Never Used   Substance Use Topics    Alcohol use: No    Drug use: No     Review of Systems    Physical Exam     Initial Vitals [06/25/19 1555]   BP Pulse Resp Temp SpO2   117/78 100 16 98 °F (36.7 °C) 100 %      MAP       --         Physical Exam    ED Course   Procedures  Labs Reviewed - No data to display       Imaging Results          X-Ray Nasal Bones (Final result)  Result time 06/25/19 19:05:13    Final result by Randall Knott MD (06/25/19 19:05:13)                 Impression:      1. Depressed nasal bone fracture.      Electronically signed by: Randall Knott MD  Date:    06/25/2019  Time:    19:05             Narrative:    EXAMINATION:  XR NASAL BONES    CLINICAL " HISTORY:  Localized swelling, mass and lump, head    COMPARISON:  None    FINDINGS:  Three views.    The paranasal sinuses and mastoid air cells are grossly clear.  There is a depressed nasal bone fracture.  The remainder of the maxillofacial ossifications truck is appear grossly intact.                              Transition/[place] of care note:  I have communicated the patient's history and progression in the ED with Dr. Schulte.  Brief H&P: Patient is a 48 y.o. male with mental retardation presents to the ED with caregiver complaing of nose laceration after fall. The patient fell head first today at 1300 at  [facility] . The pt nose was bleeding, but now stopped. The paramedics came and evaluated him but caregiver wanted to have his nose further evaluated. Tetanus vaccination UTD. No confusion, LOC, or vomiting. Per caregiver states he is acting his normal self.  Significant history and PE findings: Pt has a developmental delay. PE found 1.5 cm superficial laceration to the nose, with no tenderness or swelling to nose. Dried blood in left nare. Pt is at neurological base line.  DDx: ***  Consults/Referrals: ***   Significant lab and diagnostic findings: ***  Pending studies and consultations: ***  Disposition:  Will continue to monitor while final disposition is pending and manage patient expectations for the duration of stay in ED.  Emerald Alfaro MD, Emergency Medicine Staff 7:00 PM 6/25/2019                Scribe Attestation:   Scribe #1: I performed the above scribed service and the documentation accurately describes the services I performed. I attest to the accuracy of the note.    ***           Clinical Impression:   {Add your Clinical Impression here. If you haven't documented one yet, please pend the note, finalize a Clinical Impression, and refresh your note before signing.:[id number]}

## 2019-07-09 ENCOUNTER — OFFICE VISIT (OUTPATIENT)
Dept: OTOLARYNGOLOGY | Facility: CLINIC | Age: 49
End: 2019-07-09
Payer: MEDICAID

## 2019-07-09 VITALS
DIASTOLIC BLOOD PRESSURE: 90 MMHG | HEART RATE: 81 BPM | BODY MASS INDEX: 19.36 KG/M2 | WEIGHT: 134.94 LBS | SYSTOLIC BLOOD PRESSURE: 131 MMHG

## 2019-07-09 DIAGNOSIS — S02.2XXA CLOSED FRACTURE OF NASAL BONE, INITIAL ENCOUNTER: Primary | ICD-10-CM

## 2019-07-09 PROCEDURE — 99204 PR OFFICE/OUTPT VISIT, NEW, LEVL IV, 45-59 MIN: ICD-10-PCS | Mod: S$PBB,,, | Performed by: OTOLARYNGOLOGY

## 2019-07-09 PROCEDURE — 99999 PR PBB SHADOW E&M-EST. PATIENT-LVL III: ICD-10-PCS | Mod: PBBFAC,,, | Performed by: OTOLARYNGOLOGY

## 2019-07-09 PROCEDURE — 99999 PR PBB SHADOW E&M-EST. PATIENT-LVL III: CPT | Mod: PBBFAC,,, | Performed by: OTOLARYNGOLOGY

## 2019-07-09 PROCEDURE — 99204 OFFICE O/P NEW MOD 45 MIN: CPT | Mod: S$PBB,,, | Performed by: OTOLARYNGOLOGY

## 2019-07-09 PROCEDURE — 99213 OFFICE O/P EST LOW 20 MIN: CPT | Mod: PBBFAC | Performed by: OTOLARYNGOLOGY

## 2019-07-09 NOTE — PROGRESS NOTES
Head and Neck Surgery Consult    Seen in consultation from Dr. Alfaro    HPI: Bernard Olvera is a 48 y.o. male presenting with a nasal fracture sustained after a fall. There was epistaxis which was able to be controlled by his caregivers. No LOC or other injuries. He does have a history of self-harm, particularly head-banging and biting of the arms. He is largely nonverbal, but his caregivers have not noticed and significant changes in his behavior to indicate pain or distress caused by his injury. He has been a long-term mouth-breather.     Past Medical History:   Diagnosis Date    Behavioral problem     Glucose intolerance (impaired glucose tolerance) 11/20/2013    History of psychiatric care     Hyperlipidemia 11/28/2012    Hypothyroidism 11/28/2012    Insulin resistance 3/27/2014    Liver disease     fatty infiltration, prior Hepatitis B exposure    Mental retardation     Moderate mental retardation 11/28/2012    Personal history of seizure disorder     Profound mental retardation in adult     Psychiatric problem     Seizure disorder 11/28/2012    Self-injurious behavior     Therapy        Past Surgical History:   Procedure Laterality Date    HERNIA REPAIR           Current Outpatient Medications:     docusate sodium (COLACE) 100 MG capsule, Take 100 mg by mouth 2 (two) times daily., Disp: , Rfl:     guanFACINE 3 mg Tb24, TAKE 1 TABLET BY MOUTH every morning at 8am, Disp: 30 tablet, Rfl: 6    hydrOXYzine HCl (ATARAX) 25 MG tablet, Take 1 tablet (25 mg total) by mouth every 6 (six) hours as needed for Itching or Anxiety., Disp: 120 tablet, Rfl: 6    ibuprofen (ADVIL,MOTRIN) 600 MG tablet, Take 1 tablet (600 mg total) by mouth every 6 (six) hours as needed for Pain., Disp: 20 tablet, Rfl: 0    ketoconazole (NIZORAL) 2 % shampoo, USE TO WASH HAIR TWICE A WEEK AS DIRECTED, Disp: 240 mL, Rfl: 4    lamoTRIgine (LAMICTAL) 100 MG tablet, TAKE 1 TABLET BY MOUTH twice a day (8am/8pm), Disp: 180  tablet, Rfl: 3    levothyroxine (SYNTHROID) 50 MCG tablet, TAKE 1 TABLET BY MOUTH before breakfast daily at 8am, Disp: 90 tablet, Rfl: 3    levothyroxine (SYNTHROID) 50 MCG tablet, Take 1 tablet by mouth once daily., Disp: , Rfl:     LORazepam (ATIVAN) 2 MG Tab, Take 1 tablet (2 mg total) by mouth 2 (two) times daily. Take oral one tablet at 7 AM and noon daily, Disp: 60 tablet, Rfl: 5    multivitamin capsule, Take 1 capsule by mouth once daily., Disp: 30 capsule, Rfl: 11    olanzapine zydis (ZYPREXA) 10 MG TbDL, Take 1 tablet (10 mg total) by mouth every evening., Disp: 30 tablet, Rfl: 6    omega-3 fatty acids-vitamin E 1,000 mg Cap, One tablet twic edaily, Disp: 60 each, Rfl: 11    paliperidone (INVEGA) 3 MG TR24, Take 1 tablet (3 mg total) by mouth once daily., Disp: 30 tablet, Rfl: 3    pantoprazole (PROTONIX) 40 MG tablet, TAKE 1 TABLET BY MOUTH once daily at 8am, Disp: 90 tablet, Rfl: 3    polyethylene glycol (GLYCOLAX) 17 gram/dose powder, Take 17 g by mouth once daily., Disp: 595 g, Rfl: 3    propranolol (INDERAL LA) 60 MG 24 hr capsule, TAKE 1 CAPSULE BY MOUTH once daily at 8pm, Disp: 90 capsule, Rfl: 3    QUEtiapine (SEROQUEL) 100 MG Tab, Take oral one tablet at 7 am and one tablet at noon, Disp: 60 tablet, Rfl: 6    QUEtiapine (SEROQUEL) 25 MG Tab, Take 2 tablets (50 mg total) by mouth every 6 (six) hours as needed., Disp: 120 tablet, Rfl: 6    QUEtiapine (SEROQUEL) 50 MG tablet, Take oral 1 tablet at 7 pm nightly., Disp: 30 tablet, Rfl: 6    ranitidine (ZANTAC) 300 MG tablet, TAKE 1 TABLET BY MOUTH once daily at 8pm, Disp: 90 tablet, Rfl: 3    senna (SENOKOT) 8.6 mg tablet, Take 2 tablets by mouth once daily., Disp: , Rfl:     VITAMIN D3 1,000 unit tablet, TAKE 1 TABLET BY MOUTH EVERY DAY, Disp: 30 tablet, Rfl: 2    Review of patient's allergies indicates:   Allergen Reactions    Azithromycin        Family History   Family history unknown: Yes       Social History     Socioeconomic  History    Marital status: Single     Spouse name: Not on file    Number of children: 0    Years of education: Not on file    Highest education level: Not on file   Occupational History    Not on file   Social Needs    Financial resource strain: Not on file    Food insecurity:     Worry: Not on file     Inability: Not on file    Transportation needs:     Medical: Not on file     Non-medical: Not on file   Tobacco Use    Smoking status: Never Smoker    Smokeless tobacco: Never Used   Substance and Sexual Activity    Alcohol use: No    Drug use: No    Sexual activity: Never   Lifestyle    Physical activity:     Days per week: Not on file     Minutes per session: Not on file    Stress: Not on file   Relationships    Social connections:     Talks on phone: Not on file     Gets together: Not on file     Attends Pentecostalism service: Not on file     Active member of club or organization: Not on file     Attends meetings of clubs or organizations: Not on file     Relationship status: Not on file   Other Topics Concern    Caffeine Use Not Asked    Financial Status: Disabled Not Asked    Legal: Involved in criminal litigation Not Asked    Caffeine Use: Frequent Not Asked    Financial Status: Employed Not Asked    Legal: Other Not Asked    Caffeine Use: Moderate Not Asked    Financial Status: Unemployed Not Asked    Leisure: Exercise Not Asked    Caffeine Use: Substantial Not Asked    Financial Status: Other Not Asked    Leisure: Fishing Not Asked    Childhood History: Adopted Not Asked    Firearms: Does patient have access to a firearm? No    Leisure: Hunting Not Asked    Childhood History: Early trauma Not Asked    Home situation: homeless Not Asked    Leisure: Movie Watching Not Asked    Childhood History: Raised by parents Not Asked    Home situation: lives alone Not Asked    Leisure: Shopping Not Asked    Childhood History: Uneventful Not Asked    Home situation: lives in group home  Yes    Leisure: Sports Not Asked    Childhood History: Other Not Asked    Home situation: lives in nursing home Not Asked    Leisure: Time with family Not Asked    Education: Unfinished High School Not Asked    Home situation: lives in shelter Not Asked     Service Not Asked    Education: High School Graduate Not Asked    Home situation: lives with family Not Asked    Spirituality: Active Participation Not Asked    Education: Unfinished college Not Asked    Home situation: lives with friends Not Asked    Spirituality: Organized Jainism Not Asked    Education: Trade School Not Asked    Home situation: lives with significant other Not Asked    Spirituality: Private Participation Not Asked    Education: Associate's Degree Not Asked    Home situation: lives with spouse Not Asked    Patient feels they ought to cut down on drinking/drug use Not Asked    Education: Bachelor's Degree Not Asked    Legal consequences of chemical use Not Asked    Patient annoyed by others criticizing their drinking/drug use Not Asked    Education: More than one Bachelor's or Professional Not Asked    Legal: Arrest history Not Asked    Patient has felt bad or guilty about drinking/drug use Not Asked    Education: Master's, PhD Not Asked    Legal: Involved in civil litigation Not Asked    Patient has had a drink/used drugs as an eye opener in the AM Not Asked   Social History Narrative    Not on file       Review of Systems -  Constitutional: Denies having night sweats, constant fatigue, loss of appetite or recent substantial weight loss.  Eyes: Denies blurred vision or double vision.  Respiratory: Denies symptoms of shortness of breath, noisy breathing, hoarseness or chronic cough.  GI: Denies symptoms of heartburn, acid regurgitation, or the known presence of a hiatal hernia.  The remainder of a 10-point review of systems is negative    REVIEW OF RADIOLOGICAL FILMS AND RECORDS (PERSONALLY REVIEWED):  X-ray  reviewed, fracture of nasal bones    REVIEW OF LABS (PERSONALLY REVIEWED)  Noncontributory    PHYSICAL EXAM:  Vitals - BP (!) 131/90   Pulse 81   Wt 61.2 kg (134 lb 14.7 oz)   BMI 19.36 kg/m²   Constitutional -      General Appearance: well developed, well nourished, without obvious deformities     Communication: speaks with a normal voice without hoarseness  Head & Face -     Overall: no obvious scars, lesions or masses     Parotid and submandibular glands: no masses or tenderness     Facial strength: normal and equal bilaterally  Eyes -      EOM intact  Ear, Nose, Mouth & Throat -     Ears: both left and right external auditory canals and TM's are normal, no external deformities     Nasal exam: mucosa is pink, septum is R-deviated anteriorly and does disrupt nasal airway. There is a slight R C-shaped deformity. Visible turbinates are normal on anterior rhinoscopy     Mastication: teeth appear present     Oral Cavity and oropharynx: mucosa, hard and soft palates, tongue, posterior pharyngeal wall, lips and gums are withotu lesions. Tonsils appear unseen  Respiratory:     Breathing unlabored, no stridor  Cardiovascular:     No JVD, capillary refill normal  Larynx: using the mirror for indirect laryngoscopy, the epiglottic, false cords, true cords, and pyriform sinuses are without lesions and the true vocal cords move normally  Neck: appears symmetric, and on palpation is without masses   Endocrine:     Thyroid: no asymmetry, thyromegaly, or thyroid nodules on palpation  Lymphatic:     No cervical lymphadenopathy  Cranial Nerves:      II: Pupillary reflexes normal     III, IV, VI: EOM normal     V: 1,2,3: normal sensation     VII: Normal strength in all divisions     IX, X: Normal voice, palatal elevation and sensation     XI: Shoulder strength normal       XII: Tongue mobility normal  Psychiatric:     Appropriate affect    ASSESSMENT: Nasal fracture    PLAN: We discussed the implications of a nasal fracture, and  that the indication to repair one is to improve the nasal breathing. As his nasal breathing is not obviously bothersome to him at this time, I'd recommend watchful waiting. Repair would involve a general anesthetic, nasal casting and internal nasal splints for a week postoperatively, and may cause him more significant distress than he is experiencing now.       Yannick Sevilla

## 2019-07-30 ENCOUNTER — OFFICE VISIT (OUTPATIENT)
Dept: INTERNAL MEDICINE | Facility: CLINIC | Age: 49
End: 2019-07-30
Payer: MEDICARE

## 2019-07-30 VITALS
WEIGHT: 136.56 LBS | DIASTOLIC BLOOD PRESSURE: 60 MMHG | HEIGHT: 70 IN | SYSTOLIC BLOOD PRESSURE: 110 MMHG | BODY MASS INDEX: 19.55 KG/M2 | HEART RATE: 87 BPM | OXYGEN SATURATION: 99 %

## 2019-07-30 DIAGNOSIS — E78.49 OTHER HYPERLIPIDEMIA: ICD-10-CM

## 2019-07-30 DIAGNOSIS — F63.9 IMPULSE CONTROL DISORDER: ICD-10-CM

## 2019-07-30 DIAGNOSIS — G40.909 SEIZURE DISORDER: ICD-10-CM

## 2019-07-30 DIAGNOSIS — F73 PROFOUND INTELLECTUAL DISABILITY: ICD-10-CM

## 2019-07-30 DIAGNOSIS — E03.8 OTHER SPECIFIED HYPOTHYROIDISM: Primary | ICD-10-CM

## 2019-07-30 PROCEDURE — 99214 PR OFFICE/OUTPT VISIT, EST, LEVL IV, 30-39 MIN: ICD-10-PCS | Mod: S$PBB,,, | Performed by: INTERNAL MEDICINE

## 2019-07-30 PROCEDURE — 99999 PR PBB SHADOW E&M-EST. PATIENT-LVL II: ICD-10-PCS | Mod: PBBFAC,,, | Performed by: INTERNAL MEDICINE

## 2019-07-30 PROCEDURE — 99999 PR PBB SHADOW E&M-EST. PATIENT-LVL II: CPT | Mod: PBBFAC,,, | Performed by: INTERNAL MEDICINE

## 2019-07-30 PROCEDURE — 99212 OFFICE O/P EST SF 10 MIN: CPT | Mod: PBBFAC | Performed by: INTERNAL MEDICINE

## 2019-07-30 PROCEDURE — 99214 OFFICE O/P EST MOD 30 MIN: CPT | Mod: S$PBB,,, | Performed by: INTERNAL MEDICINE

## 2019-07-30 NOTE — PROGRESS NOTES
CHIEF COMPLAINT: Follow up seizure disorder, hyperlipidemia, behavior disorder.     HISTORY OF PRESENT ILLNESS: 49-year-old man who presents with a direct care staff, Daniela Hutson.  All history is obtained from Daniela due to his m ental handicapped.   He still gets his care through [address]. Neha Ibarra, [phone] or [phone] is his .  He is in the day program at [address] and does an outing once a week. Spoke with Neha over the phone. Neha feels that he is eating better.  He is hyperactive at times but is redirectable.  He is sleeping at night. He sleeps 6-7 hours at night.  Behavior has been good. He is happy.       HE fell in the end of June and had a nasal fracture. He saw ENT on 7/9/19. No repair was necessary.  No nasal pain. He is breathing out of his nose fine. No nasal drainage.  No apparent headaches.        He is currently taking Intuniv 3 mg daily, lorazepam 2 mg twice daily,   Zyprexa 10 mg at bedtime, Invega 3 mg daily and Propranolol LA 60 mg daily, seroquel 100 mg at 7 am, 100 mg at noon and 50 mg at 7 pm .    HE has not had a seizure since our last visit. He had a seizure this last fall after going to the [facility].  He continues to take lamictal 100 mg twice daily for his seizure disorder      He is  on Synthroid 50 mcg daily for hypothryoidism. No apparent fatigue      He has a scaly rash on the left forearm. He tends to pick at that area.      He is on vitamin D 1000 units daily for vitamin D deficiency      He is taking pantoprazole 40 mg in am and ranidtine 300 mg at bedtime.     PAST MEDICAL HISTORY:   1. Moderate to severe mental handicap.   2. Stereotypical movement disorder.   3. Impulse control disorder.   4. Seizure disorder.   5. Right undescended testis removed at age 1.   6. Hyperlipidemia - off meds currently.   7. Transaminiitis  8. Hypothyroidism    SOCIAL HISTORY: He does not smoke, does not drink. He is in supported independent living through  "[employer].     MEDICATIONS and ALLERGIES: Updated on epic     Review of systems: no fever, chills, visual change, hearing issues, sinus congestion, sore throat, shortness of breath, chest pain, abdominal pain, nausea, voimting, constipation, diarrhea, heartburn, urinary frequency, nocturia, joint pain or muscle pain, rashes, headaches, excessive thrist    PHYSICAL EXAMINATION:     /60   Pulse 87   Ht 5' 10" (1.778 m)   Wt 62 kg (136 lb 9.2 oz)   SpO2 99%   BMI 19.60 kg/m²       General: Alert, oriented. No apparent distress. Affect within normal   limits. Nonverbal.   Conjunctivae anicteric. Tympanic membranes clear. Oropharynx clear.   Neck supple.   Respiratory effort normal. Lungs clear to auscultation.   Heart: Regular rate and rhythm without murmurs, gallops or rubs.   No lower extremity edema.   ABDOMEN: soft, non distended, non tender, bowel sounds present, no hepatosplenomgaly        Labs 2/2019 reviewed     ASSESSMENT AND PLAN:     1. Weight issues - has gained weight since his medications have been adjusted   2. Anemia -cbc stable  3. Transaminitis - resolved since he has lost weight  4. Glucose intolerance - resolved since he has lost weight.   5. Seizure disorder - stable on Lamictal.   6. Moderately mentally handicapped with impulse control disorder -follow up with Dr Vides  7. Hypothyroidism - stable  8. Lichen planus vs psoriasis on arm - stable  9. Vitamin D deficiency - on replacement  10. Famiily wants to wait on the colonoscopy  I'll see him back in 4 months, sooner if problems arise.   "

## 2019-08-27 DIAGNOSIS — F73 PROFOUND INTELLECTUAL DISABILITY: ICD-10-CM

## 2019-08-27 DIAGNOSIS — F29 PSYCHOSIS, UNSPECIFIED PSYCHOSIS TYPE: ICD-10-CM

## 2019-08-27 RX ORDER — PALIPERIDONE 3 MG/1
TABLET, EXTENDED RELEASE ORAL
Qty: 30 TABLET | Refills: 4 | Status: SHIPPED | OUTPATIENT
Start: 2019-08-27 | End: 2020-01-29

## 2019-10-25 DIAGNOSIS — F29 PSYCHOSIS, UNSPECIFIED PSYCHOSIS TYPE: ICD-10-CM

## 2019-10-29 RX ORDER — OLANZAPINE 10 MG/1
TABLET, ORALLY DISINTEGRATING ORAL
Qty: 30 TABLET | Refills: 4 | Status: SHIPPED | OUTPATIENT
Start: 2019-10-29 | End: 2020-02-06 | Stop reason: SDUPTHER

## 2019-10-30 ENCOUNTER — TELEPHONE (OUTPATIENT)
Dept: INTERNAL MEDICINE | Facility: CLINIC | Age: 49
End: 2019-10-30

## 2019-11-04 ENCOUNTER — HOSPITAL ENCOUNTER (EMERGENCY)
Facility: HOSPITAL | Age: 49
Discharge: HOME | End: 2019-11-04
Attending: EMERGENCY MEDICINE
Payer: MEDICAID

## 2019-11-04 VITALS
WEIGHT: 134 LBS | RESPIRATION RATE: 20 BRPM | OXYGEN SATURATION: 98 % | TEMPERATURE: 98 F | DIASTOLIC BLOOD PRESSURE: 88 MMHG | BODY MASS INDEX: 19.23 KG/M2 | HEART RATE: 93 BPM | SYSTOLIC BLOOD PRESSURE: 130 MMHG

## 2019-11-04 DIAGNOSIS — R11.10 NON-INTRACTABLE VOMITING, PRESENCE OF NAUSEA NOT SPECIFIED, UNSPECIFIED VOMITING TYPE: Primary | ICD-10-CM

## 2019-11-04 LAB
CTP QC/QA: YES
POC MOLECULAR INFLUENZA A AGN: NEGATIVE
POC MOLECULAR INFLUENZA B AGN: NEGATIVE

## 2019-11-04 PROCEDURE — 25000003 PHARM REV CODE 250: Mod: ER | Performed by: EMERGENCY MEDICINE

## 2019-11-04 PROCEDURE — 99283 EMERGENCY DEPT VISIT LOW MDM: CPT | Mod: ER

## 2019-11-04 PROCEDURE — 87804 INFLUENZA ASSAY W/OPTIC: CPT | Mod: ER

## 2019-11-04 PROCEDURE — 87502 INFLUENZA DNA AMP PROBE: CPT | Mod: ER

## 2019-11-04 RX ORDER — ONDANSETRON 8 MG/1
8 TABLET, ORALLY DISINTEGRATING ORAL EVERY 6 HOURS PRN
Qty: 10 TABLET | Refills: 0 | Status: SHIPPED | OUTPATIENT
Start: 2019-11-04 | End: 2019-11-06

## 2019-11-04 RX ORDER — ONDANSETRON 4 MG/1
8 TABLET, ORALLY DISINTEGRATING ORAL ONCE
Status: COMPLETED | OUTPATIENT
Start: 2019-11-04 | End: 2019-11-04

## 2019-11-04 RX ADMIN — ONDANSETRON 8 MG: 4 TABLET, ORALLY DISINTEGRATING ORAL at 11:11

## 2019-11-04 NOTE — ED PROVIDER NOTES
Encounter Date: 11/4/2019    SCRIBE #1 NOTE: I, Kellen Muniz, am scribing for, and in the presence of,  Dr. Figueroa. I have scribed the following portions of the note - Other sections scribed: HPI, ROS, PE.       History     Chief Complaint   Patient presents with    Vomiting     onset this am, brought in from adult day care, reports vomiting x 3 since breakfast     Bernard Olvera is a 49 y.o. male with PMHx of mental retardation and is nonverbal who presents to the ED with caretaker complaining of vomiting 3 times after eating breakfast today. Caretaker reports vomiting once is not uncommon after eating. Patient was not given any nausea medications. Patient attends adult day care. Allergic to azithromycin.    The history is provided by a caregiver. No  was used.     Review of patient's allergies indicates:   Allergen Reactions    Azithromycin      Past Medical History:   Diagnosis Date    Behavioral problem     Glucose intolerance (impaired glucose tolerance) 11/20/2013    History of psychiatric care     Hyperlipidemia 11/28/2012    Hypothyroidism 11/28/2012    Insulin resistance 3/27/2014    Liver disease     fatty infiltration, prior Hepatitis B exposure    Mental retardation     Moderate mental retardation 11/28/2012    Personal history of seizure disorder     Profound mental retardation in adult     Psychiatric problem     Seizure disorder 11/28/2012    Self-injurious behavior     Therapy      Past Surgical History:   Procedure Laterality Date    HERNIA REPAIR       Family History   Family history unknown: Yes     Social History     Tobacco Use    Smoking status: Never Smoker    Smokeless tobacco: Never Used   Substance Use Topics    Alcohol use: No    Drug use: No     Review of Systems   Respiratory: Negative for cough.    Gastrointestinal: Positive for vomiting.   All other systems reviewed and are negative.      Physical Exam     Initial Vitals [11/04/19  1014]   BP Pulse Resp Temp SpO2   (!) 158/106 99 20 97.9 °F (36.6 °C) 97 %      MAP       --         Caretaker gave consent for physical exam to be performed.    Physical Exam    Nursing note and vitals reviewed.  Constitutional: He appears well-developed and well-nourished.   HENT:   Head: Normocephalic and atraumatic.   Right Ear: External ear normal.   Left Ear: External ear normal.   Nose: Nose normal.   Mouth/Throat: Oropharynx is clear and moist.   Eyes: Conjunctivae and EOM are normal. Pupils are equal, round, and reactive to light.   Neck: Normal range of motion. Neck supple.   Cardiovascular: Normal rate, regular rhythm, normal heart sounds and intact distal pulses. Exam reveals no gallop and no friction rub.    No murmur heard.  Pulmonary/Chest: Breath sounds normal. No stridor. No respiratory distress. He has no wheezes. He has no rhonchi. He has no rales. He exhibits no tenderness.   Abdominal: Soft. Bowel sounds are normal. He exhibits no distension and no mass. There is no tenderness. There is no rigidity, no rebound and no guarding.   Musculoskeletal: Normal range of motion.   Neurological: He is alert and oriented to person, place, and time. No cranial nerve deficit or sensory deficit. GCS score is 15. GCS eye subscore is 4. GCS verbal subscore is 5. GCS motor subscore is 6.   Skin: Skin is warm and dry. Capillary refill takes less than 2 seconds. No rash noted.   Psychiatric: He has a normal mood and affect. His behavior is normal.         ED Course   Procedures  Labs Reviewed   POCT INFLUENZA A/B MOLECULAR          Imaging Results    None          Medical Decision Making:   History:   Old Medical Records: I decided to obtain old medical records.  Clinical Tests:   Lab Tests: Ordered and Reviewed  Chief complaint: vomiting  Differential diagnosis: URI, viral illness, gastritis, Chewing food too quickly, influenza A/B    Treatment in the ED; PE, ondansetron   Patient reports feeling better after  treatment in the ER.    Patient tolerating p.o. without difficulty.  Ordered Influenza A/B   Discussed treatment, prescriptions, and labs results.  Discharge home with Zofran.   Fill and take prescriptions as directed.  Return to the ED if symptoms worsen or do not resolve.   Answered questions and discussed discharge plan.    Patient feels better and is ready for discharge.  Follow up with PCP/specialist in 1 day.            Scribe Attestation:   Scribe #1: I performed the above scribed service and the documentation accurately describes the services I performed. I attest to the accuracy of the note.     I, Dr. Terese Figueroa, personally performed the services described in this documentation. This document was produced by a scribe under my direction and in my presence. All medical record entries made by the scribe were at my direction and in my presence.  I have reviewed the chart and agree that the record reflects my personal performance and is accurate and complete. Terese Figueroa DO.     11/04/2019 12:21 PM             Clinical Impression:     1. Non-intractable vomiting, presence of nausea not specified, unspecified vomiting type                                   Terese Figueroa DO  11/04/19 1221

## 2019-11-05 DIAGNOSIS — F29 PSYCHOSIS, UNSPECIFIED PSYCHOSIS TYPE: ICD-10-CM

## 2019-11-05 DIAGNOSIS — R45.1 AGITATION: ICD-10-CM

## 2019-11-05 DIAGNOSIS — F63.9 IMPULSE CONTROL DISORDER: ICD-10-CM

## 2019-11-05 DIAGNOSIS — F73 PROFOUND INTELLECTUAL DISABILITY: ICD-10-CM

## 2019-11-05 DIAGNOSIS — R25.1 TREMOR: ICD-10-CM

## 2019-11-05 RX ORDER — HYDROXYZINE HYDROCHLORIDE 25 MG/1
25 TABLET, FILM COATED ORAL EVERY 6 HOURS PRN
Qty: 120 TABLET | Refills: 6 | Status: CANCELLED | OUTPATIENT
Start: 2019-11-05

## 2019-11-05 RX ORDER — LORAZEPAM 2 MG/1
2 TABLET ORAL 2 TIMES DAILY
Qty: 60 TABLET | Refills: 5 | Status: CANCELLED | OUTPATIENT
Start: 2019-11-05

## 2019-11-05 RX ORDER — QUETIAPINE FUMARATE 50 MG/1
TABLET, FILM COATED ORAL
Qty: 30 TABLET | Refills: 6 | Status: CANCELLED | OUTPATIENT
Start: 2019-11-05

## 2019-11-05 RX ORDER — QUETIAPINE FUMARATE 100 MG/1
TABLET, FILM COATED ORAL
Qty: 60 TABLET | Refills: 6 | Status: CANCELLED | OUTPATIENT
Start: 2019-11-05

## 2019-11-05 RX ORDER — PROPRANOLOL HYDROCHLORIDE 60 MG/1
CAPSULE, EXTENDED RELEASE ORAL
Qty: 90 CAPSULE | Refills: 3 | Status: CANCELLED | OUTPATIENT
Start: 2019-11-05

## 2019-11-05 RX ORDER — PANTOPRAZOLE SODIUM 40 MG/1
TABLET, DELAYED RELEASE ORAL
Qty: 90 TABLET | Refills: 3 | Status: SHIPPED | OUTPATIENT
Start: 2019-11-05 | End: 2020-10-14

## 2019-11-05 RX ORDER — OLANZAPINE 10 MG/1
10 TABLET, ORALLY DISINTEGRATING ORAL NIGHTLY
Qty: 30 TABLET | Refills: 4 | Status: CANCELLED | OUTPATIENT
Start: 2019-11-05

## 2019-11-05 RX ORDER — LAMOTRIGINE 100 MG/1
TABLET ORAL
Qty: 180 TABLET | Refills: 3 | Status: CANCELLED | OUTPATIENT
Start: 2019-11-05

## 2019-11-05 RX ORDER — SENNOSIDES 8.6 MG/1
2 TABLET ORAL DAILY
Status: CANCELLED | COMMUNITY
Start: 2019-11-05

## 2019-11-05 RX ORDER — ONDANSETRON 8 MG/1
8 TABLET, ORALLY DISINTEGRATING ORAL EVERY 6 HOURS PRN
Qty: 10 TABLET | Refills: 0 | Status: CANCELLED | OUTPATIENT
Start: 2019-11-05 | End: 2019-11-07

## 2019-11-05 RX ORDER — POLYETHYLENE GLYCOL 3350 17 G/17G
17 POWDER, FOR SOLUTION ORAL DAILY
Qty: 595 G | Refills: 3 | Status: SHIPPED | OUTPATIENT
Start: 2019-11-05

## 2019-11-05 RX ORDER — LEVOTHYROXINE SODIUM 50 UG/1
TABLET ORAL
Qty: 90 TABLET | Refills: 3 | Status: SHIPPED | OUTPATIENT
Start: 2019-11-05 | End: 2020-10-14

## 2019-11-05 NOTE — TELEPHONE ENCOUNTER
----- Message from Rosette Laird sent at 11/5/2019  9:35 AM CST -----  Contact: care giver/zhao gonzalez/[phone] or [phone]  Care giver called in regards to the pt is out of all his Rx and would like the dr to send them into the pharmacy  before his appointment.    [facility]  [phone]   Please advise

## 2019-11-05 NOTE — TELEPHONE ENCOUNTER
Please notify caregiver  I refilled the stomach pill and his thyroid medication  All psychaitric meds need to be refilled by his psychiatrist, Dr Ambrose Vides

## 2019-11-09 ENCOUNTER — HOSPITAL ENCOUNTER (EMERGENCY)
Facility: HOSPITAL | Age: 49
Discharge: HOME | End: 2019-11-09
Attending: EMERGENCY MEDICINE
Payer: MEDICARE

## 2019-11-09 VITALS
SYSTOLIC BLOOD PRESSURE: 126 MMHG | OXYGEN SATURATION: 100 % | HEART RATE: 99 BPM | DIASTOLIC BLOOD PRESSURE: 80 MMHG | TEMPERATURE: 98 F | RESPIRATION RATE: 18 BRPM

## 2019-11-09 DIAGNOSIS — R40.4 TRANSIENT ALTERATION OF AWARENESS: ICD-10-CM

## 2019-11-09 DIAGNOSIS — F79 INTELLECTUAL DISABILITY: Primary | ICD-10-CM

## 2019-11-09 PROCEDURE — 99285 PR EMERGENCY DEPT VISIT,LEVEL V: ICD-10-PCS | Mod: ,,, | Performed by: EMERGENCY MEDICINE

## 2019-11-09 PROCEDURE — 99285 EMERGENCY DEPT VISIT HI MDM: CPT | Mod: ,,, | Performed by: EMERGENCY MEDICINE

## 2019-11-09 PROCEDURE — 99283 EMERGENCY DEPT VISIT LOW MDM: CPT

## 2019-11-09 NOTE — PROGRESS NOTES
CHIEF COMPLAINT:  Follow up seizure disorder, hyperlipidemia, behavior disorder.     HISTORY OF PRESENT ILLNESS: 49-year-old man who presents with a his , Emily  All history is obtained from Emily due to his m ental handicapped.   He still gets his care through [address]. Neha Ibarra, [phone] or [phone] is his .  He is in the day program at [address] and does an outing once a week.   He is hyperactive at times but is redirectable.  He is sleeping at night. He sleeps 6-7 hours at night.  Behavior has been good. He is happy.      He had a seizure on 11/8/19.  EMS came and said he was ok. He was brought to the ED on 11/9/19 to get checked out. This was the first seizure in 20 years according to Emily. He was given a zofran that day for potential nausea (see below). He continues to take lamictal 100 mg twice daily for his seizure disorder       HE was in the ED on 11/4/19 due to vomiting.  He had vomited three times that day after eating breakfast.  He eats fast. He had large chunks of food that came up. IT was felt that he did not chew his food properly. He was discharged with a prescription for ondansetron 8 mg      HE fell in the end of June and had a nasal fracture. He saw ENT on 7/9/19. No repair was necessary.  No nasal pain. He is breathing out of his nose fine. No nasal drainage.  No apparent headaches.        He is currently taking Intuniv 3 mg daily, lorazepam 2 mg twice daily,   Zyprexa 10 mg at bedtime, Invega 3 mg daily and Propranolol LA 60 mg daily, seroquel 100 mg at 7 am, 100 mg at noon and 50 mg at 7 pm .          He is  on Synthroid 50 mcg daily for hypothryoidism. No apparent fatigue      He has a scaly rash on the left forearm. He tends to pick at that area.      He is on vitamin D 1000 units daily for vitamin D deficiency      He is taking pantoprazole 40 mg in am and ranidtine 300 mg at bedtime.     PAST MEDICAL HISTORY:   1. Moderate to severe mental  "handicap.   2. Stereotypical movement disorder.   3. Impulse control disorder.   4. Seizure disorder.   5. Right undescended testis removed at age 1.   6. Hyperlipidemia - off meds currently.   7. Transaminiitis  8. Hypothyroidism    SOCIAL HISTORY: He does not smoke, does not drink. He is in supported independent living through [facility].     MEDICATIONS and ALLERGIES: Updated on epic     Review of systems: no fever, chills, visual change, hearing issues, sinus congestion, sore throat, shortness of breath, chest pain, abdominal pain, nausea, voimting, constipation, diarrhea, heartburn, urinary frequency, nocturia, joint pain or muscle pain, rashes, headaches, excessive thrist    PHYSICAL EXAMINATION:     /86 (BP Location: Right arm, Patient Position: Sitting, BP Method: Large (Manual))   Pulse 97   Temp 98.6 °F (37 °C) (Oral)   Ht 5' 10" (1.778 m)   Wt 61 kg (134 lb 5.9 oz)   SpO2 98%   BMI 19.28 kg/m²      General: Alert, oriented. No apparent distress. Affect within normal   limits. Nonverbal.   Conjunctivae anicteric. Tympanic membranes clear. Oropharynx clear.   Neck supple.   Respiratory effort normal. Lungs clear to auscultation.   Heart: Regular rate and rhythm without murmurs, gallops or rubs.   No lower extremity edema.   ABDOMEN: soft, non distended, non tender, bowel sounds present, no hepatosplenomgaly  BREAST: no abn seen, no nodules palpated, no axillary lad  Feet without calluses, erythema or warmth   Right testicle removed. Left testicle no masses.         Labs 2/2019 reviewed     ASSESSMENT AND PLAN:     1. Vomiting- cut food small and watch im eat   2. Anemia -cbc   3. Transaminitis - resolved since he has lost weight. Labs today  4. Glucose intolerance - resolved since he has lost weight. Labs today  5. Seizure disorder - stable on Lamictal. Stop zofran  6. Moderately mentally handicapped with impulse control disorder -follow up with Dr Vides  7. Hypothyroidism - TSH  8. Lichen " planus vs psoriasis on arm - stable  9. Vitamin D deficiency - on replacement  10. Famiily wants to wait on the colonoscopy  I'll see him back in 4 months, sooner if problems arise.

## 2019-11-10 NOTE — ED NOTES
Patient identifiers for Bernard Olvera 49 y.o. male checked and correct.  Chief Complaint   Patient presents with    Follow-up     began to feel ill on yesterday. Caregiver reported patient needs a check up . Hx of MR     Past Medical History:   Diagnosis Date    Behavioral problem     Glucose intolerance (impaired glucose tolerance) 11/20/2013    History of psychiatric care     Hyperlipidemia 11/28/2012    Hypothyroidism 11/28/2012    Insulin resistance 3/27/2014    Liver disease     fatty infiltration, prior Hepatitis B exposure    Mental retardation     Moderate mental retardation 11/28/2012    Personal history of seizure disorder     Profound mental retardation in adult     Psychiatric problem     Seizure disorder 11/28/2012    Self-injurious behavior     Therapy      Allergies reported:   Review of patient's allergies indicates:   Allergen Reactions    Azithromycin          LOC: Patient is awake, alert, and aware of environment with an appropriate affect. Patient is oriented to self. History of mental retardation. Caregiver reports patient is at baseline, but policy is to get him checked out.  APPEARANCE: Patient resting comfortably and in no acute distress. Patient is clean and well groomed, patient's clothing is properly fastened.  SKIN: The skin is warm and dry. Patient has normal skin turgor and moist mucus membranes.   MUSKULOSKELETAL: Patient is moving all extremities well, no obvious deformities noted. Pulses intact. Ambulatory by self.   RESPIRATORY: Airway is open and patent. Respirations are spontaneous and non-labored with normal effort and rate.  CARDIAC: Patient has a normal rate and rhythm. No peripheral edema noted.   ABDOMEN: No distention noted. Soft and non-tender upon palpation; patient did not wince or move away during palpation. Caregiver reports patient vomited on Thursday. Caregiver reports patient has a good appetite.   NEUROLOGICAL: PERRL. Facial expression is  symmetrical. Hand grasps are equal bilaterally. Normal sensation in all extremities when touched with finger.

## 2019-11-10 NOTE — ED PROVIDER NOTES
Encounter Date: 11/9/2019    SCRIBE #1 NOTE: I, Shaun Osman, am scribing for, and in the presence of,  Dr. Montoya. I have scribed the following portions of the note - Other sections scribed: HPI, ROS, PE.       History     Chief Complaint   Patient presents with    Follow-up     began to feel ill on yesterday. Caregiver reported patient needs a check up . Hx of MR   Mr. Olvera is a 49 y.o. male with hypothyroidism, glucose intolerance, intellectual disability, and h/o seizures here today with a follow-up appointment after EMS visit yesterday. Caregiver stated that patient was feeling lethargic and fatigued yesterday. Called ambulance to check vitals who stated everything was baseline, so she sent the ambulance away. After talking to her boss earlier, she believes she needed to bring him in for evaluation and not send them away, so she brought him today.  He is currently at his baseline and has been acting normal since the event yesterday. No fever, cough, shortness of breath, vomiting, diarrhea or other obvious illness.. Appetite and thirst are normal.  History is limited due to patients intellectual disability.       The history is provided by a caregiver.     Review of patient's allergies indicates:   Allergen Reactions    Azithromycin      Past Medical History:   Diagnosis Date    Behavioral problem     Glucose intolerance (impaired glucose tolerance) 11/20/2013    History of psychiatric care     Hyperlipidemia 11/28/2012    Hypothyroidism 11/28/2012    Insulin resistance 3/27/2014    Liver disease     fatty infiltration, prior Hepatitis B exposure    Mental retardation     Moderate mental retardation 11/28/2012    Personal history of seizure disorder     Profound mental retardation in adult     Psychiatric problem     Seizure disorder 11/28/2012    Self-injurious behavior     Therapy      Past Surgical History:   Procedure Laterality Date    HERNIA REPAIR       Family History   Family  history unknown: Yes     Social History     Tobacco Use    Smoking status: Never Smoker    Smokeless tobacco: Never Used   Substance Use Topics    Alcohol use: No    Drug use: No     Review of Systems   Unable to perform ROS: Psychiatric disorder       Physical Exam     Initial Vitals [11/09/19 1949]   BP Pulse Resp Temp SpO2   126/80 99 18 98.3 °F (36.8 °C) 100 %      MAP       --         Physical Exam    Nursing note and vitals reviewed.  Constitutional: He appears well-developed and well-nourished. He is not diaphoretic. No distress.   HENT:   Head: Atraumatic.   Right Ear: External ear normal.   Left Ear: External ear normal.   Nose: Nose normal.   Mouth/Throat: Oropharynx is clear and moist. No oropharyngeal exudate.   Abnormal facies.   Eyes: Conjunctivae are normal. Right eye exhibits no discharge. Left eye exhibits no discharge.   Neck: Neck supple.   Cardiovascular: Normal rate, regular rhythm, normal heart sounds and intact distal pulses.   Pulmonary/Chest: Breath sounds normal. No respiratory distress. He has no wheezes. He has no rhonchi. He has no rales.   Abdominal: Soft. He exhibits no distension. There is no tenderness. There is no rebound and no guarding.   Lymphadenopathy:     He has no cervical adenopathy.   Neurological: He is alert. He is disoriented. GCS eye subscore is 4. GCS verbal subscore is 2. GCS motor subscore is 6.   Skin: Skin is warm. Capillary refill takes less than 2 seconds. No rash noted.   Psychiatric: He has a normal mood and affect.         ED Course   Procedures  Labs Reviewed - No data to display       Imaging Results    None          Medical Decision Making:   History:   Old Medical Records: I decided to obtain old medical records.  Old Records Summarized: records from clinic visits.       <> Summary of Records: Seen in ED few days ago for vomiting, which has since resolved.  Clinical Tests:   Lab Tests: Reviewed  ED Management:  Vitals normal. Afebrile. Well appearing.  No complaints today as he has been acting normal since event yesterday. Normal appetite and bathroom usage. No lethargy. I suspect pt likely had reaction to medication yesterday or breakthrough seizure resulting in prolonged postictal period resulting in symptoms. As he is at his baseline now, no indication for emergent work up at this time. Advised to f/u w/ PCP for further management. Stable for discharge at this time. Return precautions discussed.             Scribe Attestation:   Scribe #1: I performed the above scribed service and the documentation accurately describes the services I performed. I attest to the accuracy of the note.                          Clinical Impression:       ICD-10-CM ICD-9-CM   1. Intellectual disability F79 319   2. Transient alteration of awareness R40.4 780.02         Disposition:   Disposition: Discharged  Condition: Stable                     Aelssandro Montoya MD  11/11/19 1536

## 2019-11-11 ENCOUNTER — IMMUNIZATION (OUTPATIENT)
Dept: INTERNAL MEDICINE | Facility: CLINIC | Age: 49
End: 2019-11-11
Payer: MEDICARE

## 2019-11-11 ENCOUNTER — OFFICE VISIT (OUTPATIENT)
Dept: INTERNAL MEDICINE | Facility: CLINIC | Age: 49
End: 2019-11-11
Payer: MEDICARE

## 2019-11-11 VITALS
SYSTOLIC BLOOD PRESSURE: 120 MMHG | WEIGHT: 134.38 LBS | TEMPERATURE: 99 F | DIASTOLIC BLOOD PRESSURE: 86 MMHG | HEART RATE: 97 BPM | OXYGEN SATURATION: 98 % | BODY MASS INDEX: 19.24 KG/M2 | HEIGHT: 70 IN

## 2019-11-11 DIAGNOSIS — F73 PROFOUND INTELLECTUAL DISABILITY: ICD-10-CM

## 2019-11-11 DIAGNOSIS — G40.909 SEIZURE DISORDER: ICD-10-CM

## 2019-11-11 DIAGNOSIS — E55.9 VITAMIN D DEFICIENCY DISEASE: ICD-10-CM

## 2019-11-11 DIAGNOSIS — R73.02 GLUCOSE INTOLERANCE (IMPAIRED GLUCOSE TOLERANCE): ICD-10-CM

## 2019-11-11 DIAGNOSIS — F63.9 IMPULSE CONTROL DISORDER: ICD-10-CM

## 2019-11-11 DIAGNOSIS — R25.1 TREMOR: ICD-10-CM

## 2019-11-11 DIAGNOSIS — E53.8 VITAMIN B12 DEFICIENCY: ICD-10-CM

## 2019-11-11 DIAGNOSIS — Z00.00 ROUTINE GENERAL MEDICAL EXAMINATION AT A HEALTH CARE FACILITY: ICD-10-CM

## 2019-11-11 DIAGNOSIS — Z79.899 HIGH RISK MEDICATION USE: ICD-10-CM

## 2019-11-11 DIAGNOSIS — E03.8 OTHER SPECIFIED HYPOTHYROIDISM: ICD-10-CM

## 2019-11-11 DIAGNOSIS — R79.9 ABNORMAL BLOOD CHEMISTRY: ICD-10-CM

## 2019-11-11 DIAGNOSIS — E78.49 OTHER HYPERLIPIDEMIA: Primary | ICD-10-CM

## 2019-11-11 DIAGNOSIS — F29 PSYCHOSIS, UNSPECIFIED PSYCHOSIS TYPE: ICD-10-CM

## 2019-11-11 PROCEDURE — 99214 OFFICE O/P EST MOD 30 MIN: CPT | Mod: S$PBB,,, | Performed by: INTERNAL MEDICINE

## 2019-11-11 PROCEDURE — 99214 PR OFFICE/OUTPT VISIT, EST, LEVL IV, 30-39 MIN: ICD-10-PCS | Mod: S$PBB,,, | Performed by: INTERNAL MEDICINE

## 2019-11-11 PROCEDURE — 99999 PR PBB SHADOW E&M-EST. PATIENT-LVL IV: CPT | Mod: PBBFAC,,, | Performed by: INTERNAL MEDICINE

## 2019-11-11 PROCEDURE — 99999 PR PBB SHADOW E&M-EST. PATIENT-LVL IV: ICD-10-PCS | Mod: PBBFAC,,, | Performed by: INTERNAL MEDICINE

## 2019-11-11 PROCEDURE — 99214 OFFICE O/P EST MOD 30 MIN: CPT | Mod: PBBFAC | Performed by: INTERNAL MEDICINE

## 2019-11-11 PROCEDURE — 90686 IIV4 VACC NO PRSV 0.5 ML IM: CPT | Mod: PBBFAC,SL

## 2019-11-11 RX ORDER — LAMOTRIGINE 100 MG/1
TABLET ORAL
Qty: 180 TABLET | Refills: 3 | Status: SHIPPED | OUTPATIENT
Start: 2019-11-11 | End: 2020-01-28 | Stop reason: SDUPTHER

## 2019-11-11 RX ORDER — PROPRANOLOL HYDROCHLORIDE 60 MG/1
CAPSULE, EXTENDED RELEASE ORAL
Qty: 90 CAPSULE | Refills: 3 | Status: SHIPPED | OUTPATIENT
Start: 2019-11-11 | End: 2020-01-28 | Stop reason: SDUPTHER

## 2019-11-12 ENCOUNTER — HOSPITAL ENCOUNTER (EMERGENCY)
Facility: HOSPITAL | Age: 49
Discharge: HOME | End: 2019-11-12
Attending: EMERGENCY MEDICINE
Payer: MEDICARE

## 2019-11-12 ENCOUNTER — TELEPHONE (OUTPATIENT)
Dept: INTERNAL MEDICINE | Facility: CLINIC | Age: 49
End: 2019-11-12

## 2019-11-12 VITALS
HEART RATE: 98 BPM | WEIGHT: 140 LBS | DIASTOLIC BLOOD PRESSURE: 72 MMHG | SYSTOLIC BLOOD PRESSURE: 110 MMHG | TEMPERATURE: 98 F | OXYGEN SATURATION: 98 % | HEIGHT: 66 IN | RESPIRATION RATE: 18 BRPM | BODY MASS INDEX: 22.5 KG/M2

## 2019-11-12 DIAGNOSIS — Z79.899 HIGH RISK MEDICATION USE: Primary | ICD-10-CM

## 2019-11-12 DIAGNOSIS — E55.9 VITAMIN D DEFICIENCY DISEASE: ICD-10-CM

## 2019-11-12 DIAGNOSIS — R11.10 VOMITING: ICD-10-CM

## 2019-11-12 DIAGNOSIS — R11.10 VOMITING, INTRACTABILITY OF VOMITING NOT SPECIFIED, PRESENCE OF NAUSEA NOT SPECIFIED, UNSPECIFIED VOMITING TYPE: Primary | ICD-10-CM

## 2019-11-12 DIAGNOSIS — E53.8 VITAMIN B12 DEFICIENCY: ICD-10-CM

## 2019-11-12 PROCEDURE — 99283 EMERGENCY DEPT VISIT LOW MDM: CPT | Mod: 25,ER

## 2019-11-12 PROCEDURE — 25000003 PHARM REV CODE 250: Mod: ER | Performed by: NURSE PRACTITIONER

## 2019-11-12 RX ORDER — ONDANSETRON 4 MG/1
4 TABLET, ORALLY DISINTEGRATING ORAL
Status: COMPLETED | OUTPATIENT
Start: 2019-11-12 | End: 2019-11-12

## 2019-11-12 RX ADMIN — ONDANSETRON 4 MG: 4 TABLET, ORALLY DISINTEGRATING ORAL at 04:11

## 2019-11-12 NOTE — ED PROVIDER NOTES
Encounter Date: 11/12/2019    SCRIBE #1 NOTE: I, Soumya Greenberg , am scribing for, and in the presence of, Dr. Woodard .       History     Chief Complaint   Patient presents with    Nausea     PT to ER  from day out program with c/o vominting after most meals. Pt appears to be in no distress. Pt non verbal     Vomiting     Time seen by provider: 3:37 PM    This is a 49 year old male with a history of profound mental retardation who presents with complaint of 2 episodes of vomiting over the past 2 days. He currently resides at a center for special needs and his caregiver reports she thinks that he threw up today because he ate his food too fast. Before he vomited yesterday he was eating a chicken sandwich. There has been no blood in the patient's vomit and there were no episodes of diarrhea. The patient is non-verbal. He has been tolerating PO since. HPI is limited secondary to patient's non-verbal status and mental retardation.    The history is provided by a caregiver.     Review of patient's allergies indicates:   Allergen Reactions    Azithromycin      Past Medical History:   Diagnosis Date    Behavioral problem     Glucose intolerance (impaired glucose tolerance) 11/20/2013    History of psychiatric care     Hyperlipidemia 11/28/2012    Hypothyroidism 11/28/2012    Insulin resistance 3/27/2014    Liver disease     fatty infiltration, prior Hepatitis B exposure    Mental retardation     Moderate mental retardation 11/28/2012    Personal history of seizure disorder     Profound mental retardation in adult     Psychiatric problem     Seizure disorder 11/28/2012    Self-injurious behavior     Therapy      Past Surgical History:   Procedure Laterality Date    HERNIA REPAIR       Family History   Family history unknown: Yes     Social History     Tobacco Use    Smoking status: Never Smoker    Smokeless tobacco: Never Used   Substance Use Topics    Alcohol use: No    Drug use: No     Review of Systems    Unable to perform ROS: Patient nonverbal (Patient non-verbal)       Physical Exam     Initial Vitals [11/12/19 1532]   BP Pulse Resp Temp SpO2   112/78 102 18 97.2 °F (36.2 °C) 99 %      MAP       --         Physical Exam    Nursing note and vitals reviewed.  Constitutional: He appears well-developed.   Non-verbal.    HENT:   Head: Normocephalic and atraumatic.   Mouth/Throat: Oropharynx is clear and moist.   Eyes: Conjunctivae and EOM are normal. Pupils are equal, round, and reactive to light.   Neck: Normal range of motion. Neck supple.   Cardiovascular: Normal rate, regular rhythm, normal heart sounds and intact distal pulses.   Pulmonary/Chest: Effort normal and breath sounds normal. He has no wheezes.   Abdominal: Soft. There is no tenderness.   Musculoskeletal: Normal range of motion. He exhibits no edema or tenderness.   Neurological: He is alert. He has normal strength and normal reflexes.   Skin: Skin is warm and dry. Capillary refill takes less than 2 seconds. No rash noted.   Psychiatric: He has a normal mood and affect. His behavior is normal.         ED Course   Procedures  Labs Reviewed   LAMOTRIGINE LEVEL   POCT CBC   POCT URINALYSIS W/O SCOPE   POCT CMP          Imaging Results    None          Medical Decision Making:   Initial Assessment:   This is a 49-year-old male with history of significant developmental delay and nonverbal comes in with an episode of vomiting. Patient on examination has normal vital signs. On physical exam his abdomen is benign.  He has moist mucous membranes.  Exam is unremarkable otherwise.  Flat and erect films were ordered.  He was given ODT Zofran.  He was given a p.o. challenge.  Differential Diagnosis:   Gastritis, bowel obstruction, pancreatitis, dehydration, GERD.  Independently Interpreted Test(s):   I have ordered and independently interpreted X-rays - see summary below.       <> Summary of X-Ray Reading(s): Flat and erect films were independently reviewed by me  and were unremarkable.  ED Management:  Patient on reexamination was tolerating p.o..  He was discharged in good condition with close outpatient follow-up.            Scribe Attestation:   Scribe #1: I performed the above scribed service and the documentation accurately describes the services I performed. I attest to the accuracy of the note.    Attending Attestation:           Physician Attestation for Scribe:  Physician Attestation Statement for Scribe #1: I, Dr. Woodard, reviewed documentation, as scribed by Soumya Greenberg  in my presence, and it is both accurate and complete.                               Clinical Impression:     1. Vomiting            Disposition:   Disposition: Discharged  Condition: Stable                     Saloni Woodard MD  11/12/19 5417

## 2019-11-12 NOTE — TELEPHONE ENCOUNTER
Please contact caregiver  He did not show up for his labs while he was in clinic  Please have him return to get blood drawn

## 2019-11-12 NOTE — ED TRIAGE NOTES
Pt reports to ED from day out center escorted by staff from center. Per staff member, patient has been vomiting after meals with once occurrence both yesterday and today. Patient has hx of MR and is non-verbal at baseline but will follow most commands. Pt currently being uncooperative with blood draw. Will not allow tourniquet to be placed; patient getting out of bed and walking down hallway when approached with needle. Patient also jerking extremity away when approached with needle. Dr. Woodard notified.

## 2019-11-19 ENCOUNTER — LAB VISIT (OUTPATIENT)
Dept: LAB | Facility: HOSPITAL | Age: 49
End: 2019-11-19
Attending: INTERNAL MEDICINE
Payer: MEDICARE

## 2019-11-19 DIAGNOSIS — E55.9 VITAMIN D DEFICIENCY DISEASE: ICD-10-CM

## 2019-11-19 DIAGNOSIS — Z79.899 HIGH RISK MEDICATION USE: ICD-10-CM

## 2019-11-19 DIAGNOSIS — E53.8 VITAMIN B12 DEFICIENCY: ICD-10-CM

## 2019-11-19 LAB
25(OH)D3+25(OH)D2 SERPL-MCNC: 59 NG/ML (ref 30–96)
ALBUMIN SERPL BCP-MCNC: 4.1 G/DL (ref 3.5–5.2)
ALP SERPL-CCNC: 81 U/L (ref 55–135)
ALT SERPL W/O P-5'-P-CCNC: 16 U/L (ref 10–44)
AMYLASE SERPL-CCNC: 139 U/L (ref 20–110)
ANION GAP SERPL CALC-SCNC: 10 MMOL/L (ref 8–16)
AST SERPL-CCNC: 20 U/L (ref 10–40)
BASOPHILS # BLD AUTO: 0.05 K/UL (ref 0–0.2)
BASOPHILS NFR BLD: 0.7 % (ref 0–1.9)
BILIRUB SERPL-MCNC: 0.4 MG/DL (ref 0.1–1)
BUN SERPL-MCNC: 22 MG/DL (ref 6–20)
CALCIUM SERPL-MCNC: 9.6 MG/DL (ref 8.7–10.5)
CHLORIDE SERPL-SCNC: 104 MMOL/L (ref 95–110)
CHOLEST SERPL-MCNC: 199 MG/DL (ref 120–199)
CHOLEST/HDLC SERPL: 5.1 {RATIO} (ref 2–5)
CO2 SERPL-SCNC: 31 MMOL/L (ref 23–29)
CREAT SERPL-MCNC: 0.9 MG/DL (ref 0.5–1.4)
DIFFERENTIAL METHOD: ABNORMAL
EOSINOPHIL # BLD AUTO: 0.3 K/UL (ref 0–0.5)
EOSINOPHIL NFR BLD: 4 % (ref 0–8)
ERYTHROCYTE [DISTWIDTH] IN BLOOD BY AUTOMATED COUNT: 12.3 % (ref 11.5–14.5)
EST. GFR  (AFRICAN AMERICAN): >60 ML/MIN/1.73 M^2
EST. GFR  (NON AFRICAN AMERICAN): >60 ML/MIN/1.73 M^2
GLUCOSE SERPL-MCNC: 98 MG/DL (ref 70–110)
HCT VFR BLD AUTO: 40.9 % (ref 40–54)
HDLC SERPL-MCNC: 39 MG/DL (ref 40–75)
HDLC SERPL: 19.6 % (ref 20–50)
HGB BLD-MCNC: 12.8 G/DL (ref 14–18)
IMM GRANULOCYTES # BLD AUTO: 0.01 K/UL (ref 0–0.04)
IMM GRANULOCYTES NFR BLD AUTO: 0.1 % (ref 0–0.5)
LDLC SERPL CALC-MCNC: 131 MG/DL (ref 63–159)
LIPASE SERPL-CCNC: 74 U/L (ref 4–60)
LYMPHOCYTES # BLD AUTO: 1.6 K/UL (ref 1–4.8)
LYMPHOCYTES NFR BLD: 22.7 % (ref 18–48)
MCH RBC QN AUTO: 28.8 PG (ref 27–31)
MCHC RBC AUTO-ENTMCNC: 31.3 G/DL (ref 32–36)
MCV RBC AUTO: 92 FL (ref 82–98)
MONOCYTES # BLD AUTO: 0.6 K/UL (ref 0.3–1)
MONOCYTES NFR BLD: 8 % (ref 4–15)
NEUTROPHILS # BLD AUTO: 4.5 K/UL (ref 1.8–7.7)
NEUTROPHILS NFR BLD: 64.5 % (ref 38–73)
NONHDLC SERPL-MCNC: 160 MG/DL
NRBC BLD-RTO: 0 /100 WBC
PLATELET # BLD AUTO: 282 K/UL (ref 150–350)
PMV BLD AUTO: 9.8 FL (ref 9.2–12.9)
POTASSIUM SERPL-SCNC: 3.9 MMOL/L (ref 3.5–5.1)
PROT SERPL-MCNC: 7.7 G/DL (ref 6–8.4)
RBC # BLD AUTO: 4.45 M/UL (ref 4.6–6.2)
SODIUM SERPL-SCNC: 145 MMOL/L (ref 136–145)
TRIGL SERPL-MCNC: 145 MG/DL (ref 30–150)
TSH SERPL DL<=0.005 MIU/L-ACNC: 2.08 UIU/ML (ref 0.4–4)
VIT B12 SERPL-MCNC: 1377 PG/ML (ref 210–950)
WBC # BLD AUTO: 7.04 K/UL (ref 3.9–12.7)

## 2019-11-19 PROCEDURE — 80061 LIPID PANEL: CPT

## 2019-11-19 PROCEDURE — 82607 VITAMIN B-12: CPT

## 2019-11-19 PROCEDURE — 84443 ASSAY THYROID STIM HORMONE: CPT

## 2019-11-19 PROCEDURE — 82306 VITAMIN D 25 HYDROXY: CPT

## 2019-11-19 PROCEDURE — 85025 COMPLETE CBC W/AUTO DIFF WBC: CPT

## 2019-11-19 PROCEDURE — 83690 ASSAY OF LIPASE: CPT

## 2019-11-19 PROCEDURE — 82150 ASSAY OF AMYLASE: CPT

## 2019-11-19 PROCEDURE — 36415 COLL VENOUS BLD VENIPUNCTURE: CPT

## 2019-11-19 PROCEDURE — 80053 COMPREHEN METABOLIC PANEL: CPT

## 2019-11-24 ENCOUNTER — TELEPHONE (OUTPATIENT)
Dept: INTERNAL MEDICINE | Facility: CLINIC | Age: 49
End: 2019-11-24

## 2019-11-24 DIAGNOSIS — K86.1 CHRONIC PANCREATITIS, UNSPECIFIED PANCREATITIS TYPE: Primary | ICD-10-CM

## 2019-11-25 NOTE — TELEPHONE ENCOUNTER
Please notify caregiver    Blood work from 11/19/19 revealed that    Bernard's  blood count, blood sugar, kidney function, liver function, cholesterol, thyroid function, vitam in D level, vitamin B12 level are fine    Bernard's pancreas enzymes are elevated. He could have some pancreatitis that is causing his nausea and vomiting.    Dr DUCKWORTH wants to get an ultrasound of the abdomen.  He is going to have to fast for 8 hours for the test.    Please book us abdomen

## 2019-11-29 DIAGNOSIS — F73 PROFOUND INTELLECTUAL DISABILITY: ICD-10-CM

## 2019-11-29 DIAGNOSIS — R45.1 AGITATION: ICD-10-CM

## 2019-11-30 RX ORDER — QUETIAPINE FUMARATE 100 MG/1
TABLET, FILM COATED ORAL
Qty: 60 TABLET | Refills: 3 | Status: SHIPPED | OUTPATIENT
Start: 2019-11-30 | End: 2020-02-06 | Stop reason: SDUPTHER

## 2019-12-30 DIAGNOSIS — F73 PROFOUND INTELLECTUAL DISABILITY: ICD-10-CM

## 2019-12-30 DIAGNOSIS — R45.1 AGITATION: ICD-10-CM

## 2019-12-30 RX ORDER — QUETIAPINE FUMARATE 50 MG/1
TABLET, FILM COATED ORAL
Qty: 30 TABLET | Refills: 1 | Status: SHIPPED | OUTPATIENT
Start: 2019-12-30 | End: 2020-02-06 | Stop reason: SDUPTHER

## 2020-01-27 DIAGNOSIS — F29 PSYCHOSIS, UNSPECIFIED PSYCHOSIS TYPE: ICD-10-CM

## 2020-01-27 DIAGNOSIS — F73 PROFOUND INTELLECTUAL DISABILITY: ICD-10-CM

## 2020-01-28 DIAGNOSIS — R25.1 TREMOR: ICD-10-CM

## 2020-01-28 DIAGNOSIS — F63.9 IMPULSE CONTROL DISORDER: ICD-10-CM

## 2020-01-28 DIAGNOSIS — F29 PSYCHOSIS, UNSPECIFIED PSYCHOSIS TYPE: ICD-10-CM

## 2020-01-29 RX ORDER — PALIPERIDONE 3 MG/1
TABLET, EXTENDED RELEASE ORAL
Qty: 30 TABLET | Refills: 0 | Status: SHIPPED | OUTPATIENT
Start: 2020-01-29 | End: 2020-02-06 | Stop reason: SDUPTHER

## 2020-01-30 RX ORDER — LAMOTRIGINE 100 MG/1
TABLET ORAL
Qty: 180 TABLET | Refills: 0 | Status: SHIPPED | OUTPATIENT
Start: 2020-01-30 | End: 2020-02-06 | Stop reason: SDUPTHER

## 2020-01-30 RX ORDER — PROPRANOLOL HYDROCHLORIDE 60 MG/1
CAPSULE, EXTENDED RELEASE ORAL
Qty: 90 CAPSULE | Refills: 0 | Status: SHIPPED | OUTPATIENT
Start: 2020-01-30 | End: 2020-06-04

## 2020-01-30 NOTE — PROGRESS NOTES
Refill Routing Note     Medication(s) are not appropriate for processing by [facility]:    Medication Outside of Protocol (propranolol for Tremor not assessed by [facility])    Appointments  past 12m or future 3m with PCP    Date Provider   Last Visit   11/11/2019 Elaina Patterson MD   Next Visit   3/18/2020 Elaina Patterson MD           Automatic Epic Protocol Generated Data:    Requested Prescriptions   Pending Prescriptions Disp Refills    propranolol (INDERAL LA) 60 MG 24 hr capsule 90 capsule 3     Sig: TAKE 1 CAPSULE BY MOUTH once daily at 8pm       Cardiovascular:  Beta Blockers Passed - 1/28/2020  8:08 AM        Passed - Patient is at least 18 years old        Passed - Last BP in normal range within 360 days     BP Readings from Last 3 Encounters:   11/12/19 110/72   11/11/19 120/86   11/09/19 126/80              Passed - Last Heart Rate in normal range within 360 days     Pulse Readings from Last 3 Encounters:   11/12/19 98   11/11/19 97   11/09/19 99             Passed - Office visit in past 12 months or future 90 days     Recent Outpatient Visits            2 months ago Other hyperlipidemia    [facility] Elaina Patterson MD    6 months ago Other specified hypothyroidism    [facility] Elaina Patterson MD    6 months ago Closed fracture of nasal bone, initial encounter    [facility] - Head/Neck Surg Onc Yannick Sevilla MD    7 months ago Impulse control disorder    [facility] - Psychiatry Ambrose Vides MD    8 months ago Impulse control disorder    [facility] - Psych[facility] Ambrose Vidse MD          Future Appointments              In 1 month Elaina Patterson MD [facility], [facility]               lamoTRIgine (LAMICTAL) 100 MG tablet 180 tablet 3     Sig: TAKE 1 TABLET BY MOUTH twice a day (8am/8pm)       Anticonvulsants Protocol Passed - 1/28/2020  8:08 AM        Passed - Visit with Authorizing provider in past  9 months or upcoming 90 days

## 2020-02-06 ENCOUNTER — OFFICE VISIT (OUTPATIENT)
Dept: PSYCHIATRY | Facility: CLINIC | Age: 50
End: 2020-02-06
Payer: MEDICARE

## 2020-02-06 VITALS
HEART RATE: 78 BPM | HEIGHT: 66 IN | BODY MASS INDEX: 21.32 KG/M2 | SYSTOLIC BLOOD PRESSURE: 120 MMHG | DIASTOLIC BLOOD PRESSURE: 56 MMHG | WEIGHT: 132.63 LBS

## 2020-02-06 DIAGNOSIS — F73 PROFOUND INTELLECTUAL DISABILITY: ICD-10-CM

## 2020-02-06 DIAGNOSIS — G40.909 SEIZURE DISORDER: ICD-10-CM

## 2020-02-06 DIAGNOSIS — F29 PSYCHOSIS, UNSPECIFIED PSYCHOSIS TYPE: ICD-10-CM

## 2020-02-06 DIAGNOSIS — F63.9 IMPULSE CONTROL DISORDER: Primary | ICD-10-CM

## 2020-02-06 DIAGNOSIS — R45.1 AGITATION: ICD-10-CM

## 2020-02-06 DIAGNOSIS — R94.8 ABNORMAL PANCREAS FUNCTION TEST: ICD-10-CM

## 2020-02-06 PROCEDURE — 99214 OFFICE O/P EST MOD 30 MIN: CPT | Mod: S$PBB,,, | Performed by: PSYCHIATRY & NEUROLOGY

## 2020-02-06 PROCEDURE — 99213 OFFICE O/P EST LOW 20 MIN: CPT | Mod: PBBFAC | Performed by: PSYCHIATRY & NEUROLOGY

## 2020-02-06 PROCEDURE — 99214 PR OFFICE/OUTPT VISIT, EST, LEVL IV, 30-39 MIN: ICD-10-PCS | Mod: S$PBB,,, | Performed by: PSYCHIATRY & NEUROLOGY

## 2020-02-06 PROCEDURE — 99999 PR PBB SHADOW E&M-EST. PATIENT-LVL III: ICD-10-PCS | Mod: PBBFAC,,, | Performed by: PSYCHIATRY & NEUROLOGY

## 2020-02-06 PROCEDURE — 99999 PR PBB SHADOW E&M-EST. PATIENT-LVL III: CPT | Mod: PBBFAC,,, | Performed by: PSYCHIATRY & NEUROLOGY

## 2020-02-06 RX ORDER — OLANZAPINE 10 MG/1
10 TABLET, ORALLY DISINTEGRATING ORAL NIGHTLY
Qty: 30 TABLET | Refills: 6 | Status: SHIPPED | OUTPATIENT
Start: 2020-02-06 | End: 2020-10-16

## 2020-02-06 RX ORDER — LORAZEPAM 2 MG/1
2 TABLET ORAL 2 TIMES DAILY
Qty: 60 TABLET | Refills: 5 | Status: SHIPPED | OUTPATIENT
Start: 2020-02-06 | End: 2021-07-20

## 2020-02-06 RX ORDER — QUETIAPINE FUMARATE 50 MG/1
TABLET, FILM COATED ORAL
Qty: 30 TABLET | Refills: 6 | Status: SHIPPED | OUTPATIENT
Start: 2020-02-06 | End: 2020-10-23 | Stop reason: SDUPTHER

## 2020-02-06 RX ORDER — LAMOTRIGINE 100 MG/1
TABLET ORAL
Qty: 60 TABLET | Refills: 6 | Status: SHIPPED | OUTPATIENT
Start: 2020-02-06 | End: 2020-10-23 | Stop reason: SDUPTHER

## 2020-02-06 RX ORDER — QUETIAPINE FUMARATE 100 MG/1
TABLET, FILM COATED ORAL
Qty: 60 TABLET | Refills: 6 | Status: SHIPPED | OUTPATIENT
Start: 2020-02-06 | End: 2020-10-23 | Stop reason: SDUPTHER

## 2020-02-06 RX ORDER — PALIPERIDONE 3 MG/1
TABLET, EXTENDED RELEASE ORAL
Qty: 30 TABLET | Refills: 6 | Status: SHIPPED | OUTPATIENT
Start: 2020-02-06 | End: 2020-08-18

## 2020-02-06 RX ORDER — QUETIAPINE FUMARATE 25 MG/1
50 TABLET, FILM COATED ORAL EVERY 6 HOURS PRN
Qty: 120 TABLET | Refills: 6 | Status: SHIPPED | OUTPATIENT
Start: 2020-02-06 | End: 2020-10-23 | Stop reason: SDUPTHER

## 2020-02-06 NOTE — PATIENT INSTRUCTIONS
Continue all current medications with refills as noted.    Return to clinic in 3 - 4 months for follow up appt.

## 2020-02-06 NOTE — PROGRESS NOTES
"Outpatient Psychiatry Follow-Up Visit (MD/NP)   02/06/2020    Clinical Status of Patient: Outpatient (Ambulatory)     Session Length:  30 minutes (E&M)      Chief Complaint: Bernard Olvera is a 49 y.o. male who presents today for follow-up of impulsivity, irritibility, mental retardation.   Met with patient and 1 caregiver (Althea Araujo).     Interval History and Content of Current Session:   General impression:  First appointment with me since 6/21/2019.  History of interim events is obtained from RP, as pt. cannot communicate verbally -- see below.     Target symptoms: Impulsivity, irritibility, voracious appetite, mental retardation.     Interim events:    Med plan at last appt:  "Increase SCHEDULED Seroquel to 100 mg at 7 am and 100 mg at noon; continue 50 mg x 2 doses in evening like current dosing.    Continue Invega 3 mg daily.    Continue Zyprexa 10 mg qhs.    Continue PRN Seroquel as noted above.    Continue all other current medications with refills as noted."      Resident of  -- he lives in an apt with 24 hour caregivers.  He goes to a day center in the AM.    His caregiver who is present gives collateral Hx, as pt is near mute and is unable to provide any meaningful information.      He can feed self and toilet, brushes his teeth; he needs some assistance with donning clothes correctly.    He likes to watch certain shows on TV, mainly cartoons.  He has toys, stuffed animals.    He will occasionally hit his hand vs his head, or vs the wall or door, likely out of boredom more than anything else.    No verbal or physical violence towards anyone recently.    She states he still picks at skin, but this is manageable.    He has been responding well to redirection.      He has been sleeping well at night.    His appetite has been good overall.  He did have some problems with vomiting at the center; caregiver states he will eat too fast at times, which likely causes gagging and regurgitation of " food.    She denies any recent rumination of food.      He has been compliant taking his meds as below.          [address] [facility] [address] -- he is NOT with [facility] as before.      They give him all of his night meds around 8:30 PM.      He does not speak -- he only makes grunting sounds.      Review of Systems   · PSYCHIATRIC: Pertinant items are noted in the narrative.  · CONSTITUTIONAL: some recent wt fluctuations.   · MUSCULOSKELETAL: No pain or stiffness of the joints.  · NEUROLOGIC: No weakness, sensory changes, seizures, tremor or other abnormal movements.  · ENDOCRINE: No polydipsia or polyuria.  · INTEGUMENTARY: no rash, no lacerations.  · EYES: No exophthalmos, jaundice or blindness.  · ENT: No dizziness, tinnitus or hearing loss.  · RESPIRATORY: No shortness of breath.  · CARDIOVASCULAR: No tachycardia or chest pain.  · GASTROINTESTINAL: No nausea, vomiting, pain, constipation or diarrhea.  · GENITOURINARY: No frequency, dysuria.         Current Outpatient Medications:     docusate sodium (COLACE) 100 MG capsule, Take 100 mg by mouth 2 (two) times daily., Disp: , Rfl:     ibuprofen (ADVIL,MOTRIN) 600 MG tablet, Take 1 tablet (600 mg total) by mouth every 6 (six) hours as needed for Pain., Disp: 20 tablet, Rfl: 0    INVEGA 3 mg TR24, TAKE 1 TAB BY MOUTH EVERY DAY, Disp: 30 tablet, Rfl: 0    ketoconazole (NIZORAL) 2 % shampoo, USE TO WASH HAIR TWICE A WEEK AS DIRECTED, Disp: 240 mL, Rfl: 4    lamoTRIgine (LAMICTAL) 100 MG tablet, TAKE 1 TABLET BY MOUTH twice a day (8am/8pm), Disp: 180 tablet, Rfl: 0    levothyroxine (SYNTHROID) 50 MCG tablet, TAKE 1 TABLET BY MOUTH before breakfast daily at 8am, Disp: 90 tablet, Rfl: 3    LORazepam (ATIVAN) 2 MG Tab, Take 1 tablet (2 mg total) by mouth 2 (two) times daily. Take oral one tablet at 7 AM and noon daily, Disp: 60 tablet, Rfl: 5    multivitamin capsule, Take 1 capsule by mouth once daily., Disp: 30 capsule, Rfl: 11    olanzapine zydis  (ZYPREXA) 10 MG TbDL, TAKE 1 TABLET BY MOUTH EVERY EVENING., Disp: 30 tablet, Rfl: 4    omega-3 fatty acids-vitamin E 1,000 mg Cap, One tablet twic winstony, Disp: 60 each, Rfl: 11    pantoprazole (PROTONIX) 40 MG tablet, TAKE 1 TABLET BY MOUTH once daily at 8am, Disp: 90 tablet, Rfl: 3    polyethylene glycol (GLYCOLAX) 17 gram/dose powder, Take 17 g by mouth once daily., Disp: 595 g, Rfl: 3    propranolol (INDERAL LA) 60 MG 24 hr capsule, TAKE 1 CAPSULE BY MOUTH once daily at 8pm, Disp: 90 capsule, Rfl: 0    QUEtiapine (SEROQUEL) 100 MG Tab, TAKE ORALLY ONE TABLET AT 7AM AND ONE TABLET AT NOON, Disp: 60 tablet, Rfl: 3    QUEtiapine (SEROQUEL) 25 MG Tab, Take 2 tablets (50 mg total) by mouth every 6 (six) hours as needed., Disp: 120 tablet, Rfl: 6    QUEtiapine (SEROQUEL) 50 MG tablet, TAKE ORAL 1 TABLET AT 7PM NIGHTLY., Disp: 30 tablet, Rfl: 1    ranitidine (ZANTAC) 300 MG tablet, TAKE 1 TABLET BY MOUTH once daily at 8pm, Disp: 90 tablet, Rfl: 3    senna (SENOKOT) 8.6 mg tablet, Take 2 tablets by mouth once daily., Disp: , Rfl:     VITAMIN D3 1,000 unit tablet, TAKE 1 TABLET BY MOUTH EVERY DAY, Disp: 30 tablet, Rfl: 2    See above -- pt is still receiving Seroquel 50 mg one tablet QID scheduled (7 am/noon/7 pm/qhs).    Compliance: Yes     Side effects: None     Risk Parameters:   Patient reports no suicidal ideation   Patient reports no homicidal ideation   Patient reports no self-injurious behavior   Patient reports no violent behavior     Patient's Response to Intervention:   The patient's response to intervention is accepting.     Progress Toward Goals and Other Mental Status Changes:   The patient's progress toward goals is limited.     Vitals: Most recent vital signs, dated today just prior to this appointment, were reviewed:     Vitals - 1 value per visit 11/9/2019 11/11/2019 11/12/2019 2/6/2020   SYSTOLIC 126 120 110 120   DIASTOLIC 80 86 72 56   PULSE 99 97 98 78   TEMPERATURE 98.3 98.6 97.6   "  RESPIRATIONS 18  18    SPO2 100 98 98    Weight (lb)  134.37 140 132.61   Weight (kg)  60.95 63.504 60.15   HEIGHT  5' 10" 5' 6" 5' 6"   BODY MASS INDEX  19.28 22.6 21.4   VISIT REPORT       Pain Score   0         Vitals - 1 value per visit 3/20/2019 4/5/2019 4/16/2019 5/10/2019   SYSTOLIC 120 133 100 123   DIASTOLIC 75 78 68 80   PULSE 83 86 89 84   TEMPERATURE       RESPIRATIONS       SPO2       Weight (lb) 126.1 132.06 133.16 142.64   Weight (kg) 57.2 59.9 60.4 64.7   HEIGHT  6' 0" 5' 10"    BODY MASS INDEX 17.1 17.91 19.11 20.47   VISIT REPORT       Pain Score    0        Vitals - 1 value per visit 1/8/2018 3/8/2018 5/14/2018 5/17/2018   SYSTOLIC 138 121 126 98   DIASTOLIC 71 74 84 66   PULSE 84 78 76 83   TEMPERATURE 97.2  98.1 98.9   RESPIRATIONS 18  17    SPO2 98  98 97   Weight (lb) 153.88 151.13 140 148.81   Weight (kg) 69.8 68.55 63.504 67.5   HEIGHT 5' 8" 5' 8" 5' 7" 5' 7"   BODY MASS INDEX 23.4 22.98 21.93 23.31   VISIT REPORT       Pain Score     0       Labs:    Lab Visit on 11/19/2019   Component Date Value Ref Range Status    WBC 11/19/2019 7.04  3.90 - 12.70 K/uL Final    RBC 11/19/2019 4.45* 4.60 - 6.20 M/uL Final    Hemoglobin 11/19/2019 12.8* 14.0 - 18.0 g/dL Final    Hematocrit 11/19/2019 40.9  40.0 - 54.0 % Final    Mean Corpuscular Volume 11/19/2019 92  82 - 98 fL Final    Mean Corpuscular Hemoglobin 11/19/2019 28.8  27.0 - 31.0 pg Final    Mean Corpuscular Hemoglobin Conc 11/19/2019 31.3* 32.0 - 36.0 g/dL Final    RDW 11/19/2019 12.3  11.5 - 14.5 % Final    Platelets 11/19/2019 282  150 - 350 K/uL Final    MPV 11/19/2019 9.8  9.2 - 12.9 fL Final    Immature Granulocytes 11/19/2019 0.1  0.0 - 0.5 % Final    Gran # (ANC) 11/19/2019 4.5  1.8 - 7.7 K/uL Final    Immature Grans (Abs) 11/19/2019 0.01  0.00 - 0.04 K/uL Final    Lymph # 11/19/2019 1.6  1.0 - 4.8 K/uL Final    Mono # 11/19/2019 0.6  0.3 - 1.0 K/uL Final    Eos # 11/19/2019 0.3  0.0 - 0.5 K/uL Final    Baso # " 11/19/2019 0.05  0.00 - 0.20 K/uL Final    nRBC 11/19/2019 0  0 /100 WBC Final    Gran% 11/19/2019 64.5  38.0 - 73.0 % Final    Lymph% 11/19/2019 22.7  18.0 - 48.0 % Final    Mono% 11/19/2019 8.0  4.0 - 15.0 % Final    Eosinophil% 11/19/2019 4.0  0.0 - 8.0 % Final    Basophil% 11/19/2019 0.7  0.0 - 1.9 % Final    Differential Method 11/19/2019 Automated   Final    Sodium 11/19/2019 145  136 - 145 mmol/L Final    Potassium 11/19/2019 3.9  3.5 - 5.1 mmol/L Final    Chloride 11/19/2019 104  95 - 110 mmol/L Final    CO2 11/19/2019 31* 23 - 29 mmol/L Final    Glucose 11/19/2019 98  70 - 110 mg/dL Final    BUN, Bld 11/19/2019 22* 6 - 20 mg/dL Final    Creatinine 11/19/2019 0.9  0.5 - 1.4 mg/dL Final    Calcium 11/19/2019 9.6  8.7 - 10.5 mg/dL Final    Total Protein 11/19/2019 7.7  6.0 - 8.4 g/dL Final    Albumin 11/19/2019 4.1  3.5 - 5.2 g/dL Final    Total Bilirubin 11/19/2019 0.4  0.1 - 1.0 mg/dL Final    Alkaline Phosphatase 11/19/2019 81  55 - 135 U/L Final    AST 11/19/2019 20  10 - 40 U/L Final    ALT 11/19/2019 16  10 - 44 U/L Final    Anion Gap 11/19/2019 10  8 - 16 mmol/L Final    eGFR if African American 11/19/2019 >60.0  >60 mL/min/1.73 m^2 Final    eGFR if non African American 11/19/2019 >60.0  >60 mL/min/1.73 m^2 Final    TSH 11/19/2019 2.081  0.400 - 4.000 uIU/mL Final    Cholesterol 11/19/2019 199  120 - 199 mg/dL Final    Triglycerides 11/19/2019 145  30 - 150 mg/dL Final    HDL 11/19/2019 39* 40 - 75 mg/dL Final    LDL Cholesterol 11/19/2019 131.0  63.0 - 159.0 mg/dL Final    Hdl/Cholesterol Ratio 11/19/2019 19.6* 20.0 - 50.0 % Final    Total Cholesterol/HDL Ratio 11/19/2019 5.1* 2.0 - 5.0 Final    Non-HDL Cholesterol 11/19/2019 160  mg/dL Final    Vit D, 25-Hydroxy 11/19/2019 59  30 - 96 ng/mL Final    Vitamin B-12 11/19/2019 1377* 210 - 950 pg/mL Final    Amylase 11/19/2019 139* 20 - 110 U/L Final    Lipase 11/19/2019 74* 4 - 60 U/L Final   Admission on 11/04/2019,  Discharged on 11/04/2019   Component Date Value Ref Range Status    POC Molecular Influenza A Ag 11/04/2019 Negative  Negative, Not Reported Final    POC Molecular Influenza B Ag 11/04/2019 Negative  Negative, Not Reported Final     Acceptable 11/04/2019 Yes   Final         Mental Status Evaluation      Appearance:  casually dressed, disheveled   Behavior:  Not fully cooperative, impulsive, but mostly calm and redirectable, eye contact minimal, occasionally grins   Speech:  no intelligible speech; sometimes makes noises: grunts, moans    Mood:  Unable to assess; patient cannot verbally communicate    Affect:  Euthymic to mildly irritible at times, blunted, constricted   Thought Process:  Profound poverty of thought    Thought Content:  normal, no suicidality, no homicidality, delusions, or paranoia    Sensorium:  Unable to assess; patient cannot verbally communicate   Attention Span & Concentration  Poor    Cognition:  severely impaired    Insight:  very poor    Judgment:  very poor      AIMS Screening:      Facial and Oral Movements  Muscles of Facial Expression: Mild(blunting)  Lips and Perioral Area: None, normal(pt not cooperative with exam)  Jaw: None, normal  Tongue: None, normal(pt not cooperative with exam)    Extremity Movements  Upper (arms, wrists, hands, fingers): None, normal(no cogwheel rigidity noted)  Lower (legs, knees, ankles, toes): None, normal(no shuffling of gait noted)    Trunk Movements  Neck, shoulders, hips: None, normal    Overall Severity  Severity of abnormal movements (highest score from questions above): 2  Incapacitation due to abnormal movements: None, normal  Patient's awareness of abnormal movements (rate only patient's report): (unable to determine)    Dental Status  Current problems with teeth and/or dentures?: No  Does patient usually wear dentures?: No       Impression:   Impulse-Control Disorder, unspecified  Intellectual Disability, profound  Unspecified  Psychosis -- only for coding purposes for insurance coverage of pt's neuroleptic meds   Akathisia, improving (NOT CODED)   Seizure Disorder   Hx of Fatty liver with elevated liver enzymes (NOT CODED)    Plan:  Medication management:  Continue all current medications with refills as noted.           Additional Notes:  Follow up with Dr. Elaina Patterson for medical issues.     Return to Clinic: 6 months, or sooner prn.    AIMS DONE TODAY.  NEXT AIMS DUE 8/'20.

## 2020-02-07 ENCOUNTER — DOCUMENTATION ONLY (OUTPATIENT)
Dept: PSYCHIATRY | Facility: CLINIC | Age: 50
End: 2020-02-07

## 2020-02-08 NOTE — PROGRESS NOTES
JOSE ALBERTO Hanks MD             FVI    Previous Messages      ----- Message -----   From: Allie Calderon   Sent: 2/6/2020  11:27 AM CST   To: Mahogany [facility] Staff   Subject: Cancelled Labs                                   Good morning All,     Your patient came in this morning for blood work and we was not able to stick him.  He was being very aggressive when trying to look for a vein and we did not want to take the chance to stick him and someone get hurt.  The nurse that was with him said she would reschedule for another day, she was asking if maybe we had something that could hold him down but we do not have anything like that for patients.  So sorry we wasn't able to get his labs.  We had to cancel the order and they will have to be re-ordered for his future appt.     Have a Great Day     Allie Calderon

## 2020-02-15 ENCOUNTER — HOSPITAL ENCOUNTER (EMERGENCY)
Facility: HOSPITAL | Age: 50
Discharge: HOME | End: 2020-02-15
Attending: EMERGENCY MEDICINE
Payer: MEDICARE

## 2020-02-15 VITALS
DIASTOLIC BLOOD PRESSURE: 69 MMHG | HEART RATE: 90 BPM | RESPIRATION RATE: 20 BRPM | OXYGEN SATURATION: 98 % | SYSTOLIC BLOOD PRESSURE: 132 MMHG | TEMPERATURE: 98 F

## 2020-02-15 DIAGNOSIS — R19.7 DIARRHEA, UNSPECIFIED TYPE: Primary | ICD-10-CM

## 2020-02-15 LAB
ALBUMIN SERPL BCP-MCNC: 4.3 G/DL (ref 3.5–5.2)
ALP SERPL-CCNC: 80 U/L (ref 55–135)
ALT SERPL W/O P-5'-P-CCNC: 15 U/L (ref 10–44)
ANION GAP SERPL CALC-SCNC: 13 MMOL/L (ref 8–16)
AST SERPL-CCNC: 19 U/L (ref 10–40)
BACTERIA #/AREA URNS AUTO: NORMAL /HPF
BASOPHILS # BLD AUTO: 0.05 K/UL (ref 0–0.2)
BASOPHILS NFR BLD: 0.5 % (ref 0–1.9)
BILIRUB SERPL-MCNC: 0.5 MG/DL (ref 0.1–1)
BILIRUB UR QL STRIP: NEGATIVE
BUN SERPL-MCNC: 17 MG/DL (ref 6–20)
CALCIUM SERPL-MCNC: 9.8 MG/DL (ref 8.7–10.5)
CHLORIDE SERPL-SCNC: 105 MMOL/L (ref 95–110)
CLARITY UR REFRACT.AUTO: CLEAR
CO2 SERPL-SCNC: 25 MMOL/L (ref 23–29)
COLOR UR AUTO: YELLOW
CREAT SERPL-MCNC: 0.9 MG/DL (ref 0.5–1.4)
DIFFERENTIAL METHOD: ABNORMAL
EOSINOPHIL # BLD AUTO: 0.1 K/UL (ref 0–0.5)
EOSINOPHIL NFR BLD: 0.8 % (ref 0–8)
ERYTHROCYTE [DISTWIDTH] IN BLOOD BY AUTOMATED COUNT: 12.2 % (ref 11.5–14.5)
EST. GFR  (AFRICAN AMERICAN): >60 ML/MIN/1.73 M^2
EST. GFR  (NON AFRICAN AMERICAN): >60 ML/MIN/1.73 M^2
GLUCOSE SERPL-MCNC: 154 MG/DL (ref 70–110)
GLUCOSE UR QL STRIP: NEGATIVE
HCT VFR BLD AUTO: 41.8 % (ref 40–54)
HGB BLD-MCNC: 13 G/DL (ref 14–18)
HGB UR QL STRIP: NEGATIVE
IMM GRANULOCYTES # BLD AUTO: 0.03 K/UL (ref 0–0.04)
IMM GRANULOCYTES NFR BLD AUTO: 0.3 % (ref 0–0.5)
INFLUENZA A, MOLECULAR: NEGATIVE
INFLUENZA B, MOLECULAR: NEGATIVE
KETONES UR QL STRIP: NEGATIVE
LEUKOCYTE ESTERASE UR QL STRIP: NEGATIVE
LIPASE SERPL-CCNC: 39 U/L (ref 4–60)
LYMPHOCYTES # BLD AUTO: 1.2 K/UL (ref 1–4.8)
LYMPHOCYTES NFR BLD: 11.4 % (ref 18–48)
MCH RBC QN AUTO: 28.8 PG (ref 27–31)
MCHC RBC AUTO-ENTMCNC: 31.1 G/DL (ref 32–36)
MCV RBC AUTO: 93 FL (ref 82–98)
MICROSCOPIC COMMENT: NORMAL
MONOCYTES # BLD AUTO: 0.5 K/UL (ref 0.3–1)
MONOCYTES NFR BLD: 4.8 % (ref 4–15)
NEUTROPHILS # BLD AUTO: 8.8 K/UL (ref 1.8–7.7)
NEUTROPHILS NFR BLD: 82.2 % (ref 38–73)
NITRITE UR QL STRIP: NEGATIVE
NRBC BLD-RTO: 0 /100 WBC
PH UR STRIP: 5 [PH] (ref 5–8)
PLATELET # BLD AUTO: 226 K/UL (ref 150–350)
PMV BLD AUTO: 10.5 FL (ref 9.2–12.9)
POTASSIUM SERPL-SCNC: 4 MMOL/L (ref 3.5–5.1)
PROT SERPL-MCNC: 7.6 G/DL (ref 6–8.4)
PROT UR QL STRIP: NEGATIVE
RBC # BLD AUTO: 4.51 M/UL (ref 4.6–6.2)
RBC #/AREA URNS AUTO: 1 /HPF (ref 0–4)
SODIUM SERPL-SCNC: 143 MMOL/L (ref 136–145)
SP GR UR STRIP: 1.03 (ref 1–1.03)
SPECIMEN SOURCE: NORMAL
URN SPEC COLLECT METH UR: NORMAL
WBC # BLD AUTO: 10.69 K/UL (ref 3.9–12.7)
WBC #/AREA URNS AUTO: 2 /HPF (ref 0–5)

## 2020-02-15 PROCEDURE — 87502 INFLUENZA DNA AMP PROBE: CPT

## 2020-02-15 PROCEDURE — 81001 URINALYSIS AUTO W/SCOPE: CPT

## 2020-02-15 PROCEDURE — 80053 COMPREHEN METABOLIC PANEL: CPT

## 2020-02-15 PROCEDURE — 99283 EMERGENCY DEPT VISIT LOW MDM: CPT | Mod: 25

## 2020-02-15 PROCEDURE — 83690 ASSAY OF LIPASE: CPT

## 2020-02-15 PROCEDURE — 99284 PR EMERGENCY DEPT VISIT,LEVEL IV: ICD-10-PCS | Mod: ,,, | Performed by: EMERGENCY MEDICINE

## 2020-02-15 PROCEDURE — 85025 COMPLETE CBC W/AUTO DIFF WBC: CPT

## 2020-02-15 PROCEDURE — 99284 EMERGENCY DEPT VISIT MOD MDM: CPT | Mod: ,,, | Performed by: EMERGENCY MEDICINE

## 2020-02-16 NOTE — ED PROVIDER NOTES
Encounter Date: 2/15/2020       History     Chief Complaint   Patient presents with    Diarrhea     Diarrhea & decreased appetite x2 days. Patient from home. Patient non-verbal & autistic baseline.      HPI   Mr. Olvera is a 49 y.o. male with severe intellectual disability here today with decreased PO intake and diarrhea. Pt is unable to provide any history as he is nonverbal. Sitter states he has not been acting himself over the past day. Has had some increased stooling yesterday, which has resolved mostly today. Had decreased PO intake over the past day as well. Per their protocols, they were instructed to bring him to the ED for evaluation. No known fevers, emesis, lethargy. No hematochezia or melena.     Review of patient's allergies indicates:   Allergen Reactions    Azithromycin      Past Medical History:   Diagnosis Date    Behavioral problem     Glucose intolerance (impaired glucose tolerance) 11/20/2013    History of psychiatric care     Hyperlipidemia 11/28/2012    Hypothyroidism 11/28/2012    Insulin resistance 3/27/2014    Liver disease     fatty infiltration, prior Hepatitis B exposure    Mental retardation     Moderate mental retardation 11/28/2012    Personal history of seizure disorder     Profound mental retardation in adult     Psychiatric problem     Seizure disorder 11/28/2012    Self-injurious behavior     Therapy      Past Surgical History:   Procedure Laterality Date    HERNIA REPAIR       Family History   Family history unknown: Yes     Social History     Tobacco Use    Smoking status: Never Smoker    Smokeless tobacco: Never Used   Substance Use Topics    Alcohol use: No    Drug use: No     Review of Systems   Unable to perform ROS: Psychiatric disorder       Physical Exam     Initial Vitals [02/15/20 1859]   BP Pulse Resp Temp SpO2   136/80 90 20 97.8 °F (36.6 °C) 98 %      MAP       --         Physical Exam    Nursing note and vitals reviewed.  Constitutional: He  appears well-developed and well-nourished. He is not diaphoretic. No distress.   HENT:   Head: Atraumatic.   Right Ear: External ear normal.   Left Ear: External ear normal.   Abnormal facies   Neck: Neck supple.   Cardiovascular: Normal rate, regular rhythm, normal heart sounds and intact distal pulses.   Pulmonary/Chest: Breath sounds normal. No respiratory distress. He has no wheezes. He has no rhonchi. He has no rales.   Abdominal: Soft. He exhibits no distension. There is no tenderness. There is no rebound and no guarding.   Neurological: He is alert. He is disoriented. GCS eye subscore is 4. GCS verbal subscore is 1. GCS motor subscore is 6.   Moves all extremities spontaneously   Skin: Skin is warm. Capillary refill takes less than 2 seconds. No rash noted.   Psychiatric: He has a normal mood and affect.         ED Course   Procedures  Labs Reviewed   CBC W/ AUTO DIFFERENTIAL - Abnormal; Notable for the following components:       Result Value    RBC 4.51 (*)     Hemoglobin 13.0 (*)     Mean Corpuscular Hemoglobin Conc 31.1 (*)     Gran # (ANC) 8.8 (*)     Gran% 82.2 (*)     Lymph% 11.4 (*)     All other components within normal limits   COMPREHENSIVE METABOLIC PANEL - Abnormal; Notable for the following components:    Glucose 154 (*)     All other components within normal limits   INFLUENZA A & B BY MOLECULAR   LIPASE   URINALYSIS, REFLEX TO URINE CULTURE    Narrative:     Preferred Collection Type->Urine, Clean Catch   URINALYSIS MICROSCOPIC    Narrative:     Preferred Collection Type->Urine, Clean Catch          Imaging Results    None          Medical Decision Making:   History:   I obtained history from: someone other than patient.       <> Summary of History: Caregiver provided history  Old Medical Records: I decided to obtain old medical records.  Old Records Summarized: records from clinic visits.       <> Summary of Records: I evaluated patient here in ED in past after possible breakthrough  seizure  Clinical Tests:   Lab Tests: Ordered and Reviewed  ED Management:  Vitals normal. Afebrile. Well appearing. Appears well hydrated. No abd TTP or concerning features on exam. Has had PO intake, just somewhat decreased. Diarrheal illness sounds to have been self limiting. Will check electrolytes and blood counts with CBC, CMP, lipase, UA. Will r/o flu as well.    Labs reviewed; grossly WNL w/o actionable values.    Continues to be interactive in ED with acute changes or abnormal findings on monitor.  Again, abd exam remains normal.  Suspect he had self limiting diarrheal illness and is likely back to baseline self now.    Stable for discharge at this time. Return precautions discussed.                                  Clinical Impression:       ICD-10-CM ICD-9-CM   1. Diarrhea, unspecified type R19.7 787.91         Disposition:   Disposition: Discharged  Condition: Stable                     Alessandro Montoya MD  02/17/20 1310

## 2020-03-18 ENCOUNTER — TELEPHONE (OUTPATIENT)
Dept: INTERNAL MEDICINE | Facility: CLINIC | Age: 50
End: 2020-03-18

## 2020-03-18 NOTE — TELEPHONE ENCOUNTER
----- Message from Iman Santana sent at 3/18/2020 10:36 AM CDT -----  Contact: Neha Montelongo /[phone]  Ms Solomon would like to know if Dr Patterson would be able to do a phone consultation instead of coming in.    Patient cancelled appointment for today at 10:30am    Thank you

## 2020-03-24 NOTE — TELEPHONE ENCOUNTER
Please schedule a video visit with Trice - she will have to get on his [facility] account to do this

## 2020-05-21 ENCOUNTER — TELEPHONE (OUTPATIENT)
Dept: PSYCHIATRY | Facility: CLINIC | Age: 50
End: 2020-05-21

## 2020-05-21 NOTE — TELEPHONE ENCOUNTER
STAFF NOTE:  Called both #'s listed for pt in EMR during pt's scheduled appt time.  No answer at either #.  One # had outgoing nondescript voice mail, so message was not left.    Pt's caregiver needs to call and reschedule appt.

## 2020-06-03 ENCOUNTER — PATIENT OUTREACH (OUTPATIENT)
Dept: ADMINISTRATIVE | Facility: HOSPITAL | Age: 50
End: 2020-06-03

## 2020-06-03 NOTE — LETTER
Anjana 3, 2020    Bernard Olvera  [address]  [place] [address]             [facility]  Elaina Patterson MD   [address]  Phone: [phone]  Fax: [phone]   Dear Mr. Olvera:    I am contacting you because you are currently due for colon cancer screening. As you may know, your primary care provider has placed an order for a colonoscopy. However, you have not yet scheduled the procedure. Please call [phone] to schedule.    Also, there is an alternative method of colon cancer screening that I can send you. It is called a FIT Kit. It is a simple procedure that requires a stool sample. Please let me know if you would rather have a FIT Kit instead of the colonoscopy.     If you have any questions or concerns, please don't hesitate to call.    Sincerely,  Trenton Olsen LPN  Clinical Care Coordinator  [facility]  [phone] (fax)          Elaina Patterson MD

## 2020-06-04 DIAGNOSIS — R25.1 TREMOR: ICD-10-CM

## 2020-06-04 RX ORDER — PROPRANOLOL HYDROCHLORIDE 60 MG/1
CAPSULE, EXTENDED RELEASE ORAL
Qty: 90 CAPSULE | Refills: 1 | Status: SHIPPED | OUTPATIENT
Start: 2020-06-04 | End: 2020-11-16

## 2020-06-04 NOTE — PROGRESS NOTES
Refill Routing Note    Medication(s) are not appropriate for processing by [facility]:       Outside of protocol(Tremor not assessed by OR)           Medication reconciliation completed: No      Automatic Epic Protocol Generated Data:    Requested Prescriptions   Pending Prescriptions Disp Refills    propranoloL (INDERAL LA) 60 MG 24 hr capsule [Pharmacy Med Name: PROPRANOLOL HCL ER 60MG CAP SA 24H] 90 capsule 1     Sig: TAKE 1 CAP BY MOUTH ONCE DAILY AT 8PM       Cardiovascular:  Beta Blockers Passed - 6/4/2020 11:20 AM        Passed - Patient is at least 18 years old        Passed - Last BP in normal range within 360 days.     BP Readings from Last 3 Encounters:   02/15/20 132/69   11/12/19 110/72   11/11/19 120/86              Passed - Last Heart Rate in normal range within 360 days.     Pulse Readings from Last 3 Encounters:   02/15/20 90   11/12/19 98   11/11/19 97             Passed - Office visit in past 12 months or future 90 days.     Recent Outpatient Visits            3 months ago Impulse control disorder    [facility] - Psychiatry Ambrose Vides MD    6 months ago Other hyperlipidemia    [facility] - Internal Medicine Elaina Patterson MD    10 months ago Other specified hypothyroidism    [facility] - Internal Medicine Elaina Patterson MD    11 months ago Closed fracture of nasal bone, initial encounter    [facility] On[facility] Yannick Sevilla MD    11 months ago Impulse control disorder    [facility] - Psychiatry Ambrose Vides MD                       Appointments  past 12m or future 3m with PCP    Date Provider   Last Visit   11/11/2019 Elaina Patterson MD   Next Visit   Visit date not found Elaina Patterson MD   ED visits in past 90 days: 0     Note composed:1:22 PM 06/04/2020

## 2020-09-03 ENCOUNTER — HOSPITAL ENCOUNTER (EMERGENCY)
Facility: HOSPITAL | Age: 50
Discharge: HOME | End: 2020-09-03
Attending: EMERGENCY MEDICINE
Payer: MEDICARE

## 2020-09-03 VITALS
WEIGHT: 132 LBS | TEMPERATURE: 98 F | RESPIRATION RATE: 16 BRPM | BODY MASS INDEX: 21.31 KG/M2 | HEART RATE: 84 BPM | OXYGEN SATURATION: 99 %

## 2020-09-03 DIAGNOSIS — R45.89 FIDGETING: Primary | ICD-10-CM

## 2020-09-03 LAB
ALBUMIN SERPL BCP-MCNC: 4.2 G/DL (ref 3.5–5.2)
ALP SERPL-CCNC: 65 U/L (ref 55–135)
ALT SERPL W/O P-5'-P-CCNC: 13 U/L (ref 10–44)
ANION GAP SERPL CALC-SCNC: 14 MMOL/L (ref 8–16)
AST SERPL-CCNC: 15 U/L (ref 10–40)
BASOPHILS # BLD AUTO: 0.05 K/UL (ref 0–0.2)
BASOPHILS NFR BLD: 0.6 % (ref 0–1.9)
BILIRUB SERPL-MCNC: 0.7 MG/DL (ref 0.1–1)
BILIRUB UR QL STRIP: NEGATIVE
BUN SERPL-MCNC: 17 MG/DL (ref 6–20)
BUN SERPL-MCNC: 19 MG/DL (ref 6–30)
CALCIUM SERPL-MCNC: 10 MG/DL (ref 8.7–10.5)
CHLORIDE SERPL-SCNC: 103 MMOL/L (ref 95–110)
CHLORIDE SERPL-SCNC: 106 MMOL/L (ref 95–110)
CLARITY UR REFRACT.AUTO: CLEAR
CO2 SERPL-SCNC: 26 MMOL/L (ref 23–29)
COLOR UR AUTO: YELLOW
CREAT SERPL-MCNC: 0.9 MG/DL (ref 0.5–1.4)
CREAT SERPL-MCNC: 0.9 MG/DL (ref 0.5–1.4)
DIFFERENTIAL METHOD: ABNORMAL
EOSINOPHIL # BLD AUTO: 0 K/UL (ref 0–0.5)
EOSINOPHIL NFR BLD: 0.5 % (ref 0–8)
ERYTHROCYTE [DISTWIDTH] IN BLOOD BY AUTOMATED COUNT: 11.9 % (ref 11.5–14.5)
EST. GFR  (AFRICAN AMERICAN): >60 ML/MIN/1.73 M^2
EST. GFR  (NON AFRICAN AMERICAN): >60 ML/MIN/1.73 M^2
GLUCOSE SERPL-MCNC: 128 MG/DL (ref 70–110)
GLUCOSE SERPL-MCNC: 132 MG/DL (ref 70–110)
GLUCOSE UR QL STRIP: NEGATIVE
HCT VFR BLD AUTO: 41.5 % (ref 40–54)
HCT VFR BLD CALC: 39 %PCV (ref 36–54)
HGB BLD-MCNC: 12.9 G/DL (ref 14–18)
HGB UR QL STRIP: NEGATIVE
IMM GRANULOCYTES # BLD AUTO: 0.02 K/UL (ref 0–0.04)
IMM GRANULOCYTES NFR BLD AUTO: 0.3 % (ref 0–0.5)
KETONES UR QL STRIP: NEGATIVE
LACTATE SERPL-SCNC: 1.1 MMOL/L (ref 0.5–2.2)
LACTATE SERPL-SCNC: 7.1 MMOL/L (ref 0.5–2.2)
LEUKOCYTE ESTERASE UR QL STRIP: NEGATIVE
LYMPHOCYTES # BLD AUTO: 1.6 K/UL (ref 1–4.8)
LYMPHOCYTES NFR BLD: 20.3 % (ref 18–48)
MCH RBC QN AUTO: 29.5 PG (ref 27–31)
MCHC RBC AUTO-ENTMCNC: 31.1 G/DL (ref 32–36)
MCV RBC AUTO: 95 FL (ref 82–98)
MONOCYTES # BLD AUTO: 0.5 K/UL (ref 0.3–1)
MONOCYTES NFR BLD: 6.9 % (ref 4–15)
NEUTROPHILS # BLD AUTO: 5.6 K/UL (ref 1.8–7.7)
NEUTROPHILS NFR BLD: 71.4 % (ref 38–73)
NITRITE UR QL STRIP: NEGATIVE
NRBC BLD-RTO: 0 /100 WBC
PH UR STRIP: 6 [PH] (ref 5–8)
PLATELET # BLD AUTO: 241 K/UL (ref 150–350)
PMV BLD AUTO: 10 FL (ref 9.2–12.9)
POC IONIZED CALCIUM: 1.28 MMOL/L (ref 1.06–1.42)
POC TCO2 (MEASURED): 27 MMOL/L (ref 23–29)
POTASSIUM BLD-SCNC: 3.3 MMOL/L (ref 3.5–5.1)
POTASSIUM SERPL-SCNC: 3.4 MMOL/L (ref 3.5–5.1)
PROT SERPL-MCNC: 7.2 G/DL (ref 6–8.4)
PROT UR QL STRIP: NEGATIVE
RBC # BLD AUTO: 4.37 M/UL (ref 4.6–6.2)
SAMPLE: ABNORMAL
SODIUM BLD-SCNC: 145 MMOL/L (ref 136–145)
SODIUM SERPL-SCNC: 146 MMOL/L (ref 136–145)
SP GR UR STRIP: 1.01 (ref 1–1.03)
TROPONIN I SERPL DL<=0.01 NG/ML-MCNC: <0.006 NG/ML (ref 0–0.03)
URN SPEC COLLECT METH UR: NORMAL
WBC # BLD AUTO: 7.8 K/UL (ref 3.9–12.7)

## 2020-09-03 PROCEDURE — 99284 EMERGENCY DEPT VISIT MOD MDM: CPT | Mod: ,,, | Performed by: EMERGENCY MEDICINE

## 2020-09-03 PROCEDURE — 82330 ASSAY OF CALCIUM: CPT

## 2020-09-03 PROCEDURE — 80053 COMPREHEN METABOLIC PANEL: CPT

## 2020-09-03 PROCEDURE — 81003 URINALYSIS AUTO W/O SCOPE: CPT

## 2020-09-03 PROCEDURE — 85025 COMPLETE CBC W/AUTO DIFF WBC: CPT

## 2020-09-03 PROCEDURE — 83605 ASSAY OF LACTIC ACID: CPT

## 2020-09-03 PROCEDURE — 99284 PR EMERGENCY DEPT VISIT,LEVEL IV: ICD-10-PCS | Mod: ,,, | Performed by: EMERGENCY MEDICINE

## 2020-09-03 PROCEDURE — 84484 ASSAY OF TROPONIN QUANT: CPT

## 2020-09-03 PROCEDURE — 99284 EMERGENCY DEPT VISIT MOD MDM: CPT | Mod: 25

## 2020-09-03 PROCEDURE — 25000003 PHARM REV CODE 250: Performed by: EMERGENCY MEDICINE

## 2020-09-03 PROCEDURE — 80047 BASIC METABLC PNL IONIZED CA: CPT

## 2020-09-03 RX ORDER — LORAZEPAM 1 MG/1
2 TABLET ORAL
Status: COMPLETED | OUTPATIENT
Start: 2020-09-03 | End: 2020-09-03

## 2020-09-03 RX ORDER — OLANZAPINE 10 MG/1
20 TABLET ORAL DAILY
Status: DISCONTINUED | OUTPATIENT
Start: 2020-09-03 | End: 2020-09-03 | Stop reason: HOSPADM

## 2020-09-03 RX ADMIN — OLANZAPINE 20 MG: 10 TABLET, FILM COATED ORAL at 11:09

## 2020-09-03 RX ADMIN — LORAZEPAM 2 MG: 1 TABLET ORAL at 11:09

## 2020-09-03 NOTE — ED TRIAGE NOTES
Patient comes into ER by EMS with caregiver at bedside from home with complaints of tremors that started this morning. Patient's caregiver states that the tremors look like the patient is being scared for about 1 minute at a time. Patient is nonverbal.

## 2020-09-03 NOTE — ED PROVIDER NOTES
Encounter Date: 9/3/2020       History     Chief Complaint   Patient presents with    Altered Mental Status     Pt is mentally delayed. Per caregiver pt is acting like himself. Pt is espiodes of shaking. Pt is non verbal. Pt is refusing vitals at this time      HPI     This is a 50-year-old man with a history of developmental delay, hypertension, hyperlipidemia, seizures who presents via EMS with acute onset of tremor.  History is provided exclusively by the patient's caregiver at bedside, as the patient himself is nonverbal.  She reports that normally he does not talk much, but always follows commands and is friendly and enjoys cartoons.  Today she noticed that he was having spasms of his entire body, and looked like he was scared.  He is very fidgety right now, and she reports that is not normal for him.  He has been taking his medicines, which includes Lamictal for seizures, as well as Ativan t.i.d..  History is limited due the patient's baseline developmental delay.  Review of patient's allergies indicates:   Allergen Reactions    Azithromycin      Past Medical History:   Diagnosis Date    Behavioral problem     Glucose intolerance (impaired glucose tolerance) 11/20/2013    History of psychiatric care     Hyperlipidemia 11/28/2012    Hypothyroidism 11/28/2012    Insulin resistance 3/27/2014    Liver disease     fatty infiltration, prior Hepatitis B exposure    Mental retardation     Moderate mental retardation 11/28/2012    Personal history of seizure disorder     Profound mental retardation in adult     Psychiatric problem     Seizure disorder 11/28/2012    Self-injurious behavior     Therapy      Past Surgical History:   Procedure Laterality Date    HERNIA REPAIR       Family History   Family history unknown: Yes     Social History     Tobacco Use    Smoking status: Never Smoker    Smokeless tobacco: Never Used   Substance Use Topics    Alcohol use: No    Drug use: No     Review of  Systems   Unable to perform ROS: Patient nonverbal       Physical Exam     Initial Vitals [09/03/20 1113]   BP Pulse Resp Temp SpO2   -- 84 16 98 °F (36.7 °C) 99 %      MAP       --         Physical Exam   Gen:  Alert, follows simple commands, interacts with interviewer but does not answer questions.  He is fidgety, and occasionally tries to get up and leave the room, but is redirectable.  Eye: sclera anicteric, EOMI, no conjunctivitis, no periorbital edema  ENT: NCAT, OP clear, neck supple with FROM, no stridor  CVS: RRR, no m/r/g, distal pulses intact/symmetric  Pulm: CTAB, no wheezes, rales or rhonchi, no increased work of breathing  Abd: soft, nontender, nondistended, no organomegaly, no CVAT  Ext: no edema, lesions, rashes, or deformity  Neuro:  Nonverbal, fidgety, no obvious convulsive activity, moving all extremities, gait intact  Psych: normal affect, cooperative    ED Course   Procedures  Labs Reviewed   CBC W/ AUTO DIFFERENTIAL - Abnormal; Notable for the following components:       Result Value    RBC 4.37 (*)     Hemoglobin 12.9 (*)     Mean Corpuscular Hemoglobin Conc 31.1 (*)     All other components within normal limits   COMPREHENSIVE METABOLIC PANEL - Abnormal; Notable for the following components:    Sodium 146 (*)     Potassium 3.4 (*)     Glucose 128 (*)     All other components within normal limits   LACTIC ACID, PLASMA - Abnormal; Notable for the following components:    Lactate (Lactic Acid) 7.1 (*)     All other components within normal limits   ISTAT PROCEDURE - Abnormal; Notable for the following components:    POC Glucose 132 (*)     POC Potassium 3.3 (*)     All other components within normal limits   TROPONIN I   URINALYSIS, REFLEX TO URINE CULTURE    Narrative:     Specimen Source->Urine   LACTIC ACID, PLASMA          Imaging Results          X-Ray Chest AP Portable (Final result)  Result time 09/03/20 12:01:20    Final result by Lacie Lambert MD (09/03/20 12:01:20)                  Impression:      1.5 x 0.5 cm heterogeneous opacity projects over the left lower lung zone at the level of the 5th and 9th ribs, not present on earlier studies.  Please see discussion above.    No source for altered mental status identified.      Electronically signed by: Lacie Lambert MD  Date:    09/03/2020  Time:    12:01             Narrative:    EXAMINATION:  XR CHEST AP PORTABLE    CLINICAL HISTORY:  altered mental staus;    TECHNIQUE:  Single frontal view of the chest was performed.    COMPARISON:  01/03/2019.  02/04/2017.  12/11/2007.    FINDINGS:  Mediastinal structures are midline. Cardiac silhouette and pulmonary vascular distribution are normal.    Lung volumes are normal and symmetric.  On this single AP view there is a faint 1.5 x 0.5 cm opacity projected over the left lower lung zone at the anterior aspect of the 5th rib and the posterior portion of the 9th rib.  I detect no corresponding abnormality on earlier studies cited above.  It may represent artifact, subsegmental atelectasis, subsegmental inflammation or postinflammatory change.  However the possibility of neoplasm, primary or metastatic, cannot be excluded on the basis of this single study.    I detect no pleural fluid, lymph node enlargement, cardiac decompensation, pneumothorax, pneumomediastinum, pneumoperitoneum or significant osseous abnormality.  Hypertrophic calcification at the 1st costochondral junctions represents a normal senescent change.                                 Medical Decision Making:   History:   I obtained history from: someone other than patient.       <> Summary of History: His primary caregiver at bedside  Old Medical Records: I decided to obtain old medical records.  Initial Assessment:   This is a 50-year-old man with a history of developmental delay, who presents with fidgety this noticed today.  I witnessed the episodes the caregiver is concerned about, it does not appear consistent with seizure, myoclonic  jerking, or other muscle spasm.  Differential is of course quite broad for his change in behavior, and could include electrolyte abnormality, dehydration, urinary tract infection, other infection, or pneumonia versus aspiration.  His vital signs are reassuring, do not think this is contributing.  Plan for labs including urinalysis and chest x-ray for further management.  Clinical Tests:   Lab Tests: Ordered and Reviewed  Radiological Study: Ordered and Reviewed  ED Management:  Chest x-ray by my independent interpretation is unremarkable.  Initial labs are notable for a profoundly elevated lactate, but with a CMP that is not suggestive of an anion gap acidosis, I think this may be a spurious lab.  We repeated it, and it is normal.  His urinalysis is also unremarkable.  He has a primary care appointment tomorrow, I think further workup can be deferred until then as needed.  Patient was discharged in stable condition.                                 Clinical Impression:       ICD-10-CM ICD-9-CM   1. Fidgeting  R45.89 799.29             ED Disposition Condition    Discharge Stable        ED Prescriptions     None        Follow-up Information     Follow up With Specialties Details Why Contact Info    Elaina Patterson MD Internal Medicine Go in 1 day  [address]  [place] [address]  [phone]      [facility] Emergency Medicine  If symptoms worsen [address] [facility] [id number] [phone]                                     Bailey Rodriguez MD  09/03/20 2970

## 2020-09-03 NOTE — ED NOTES
I-STAT Chem-8+ Results:   Value Reference Range   Sodium 145 136-145 mmol/L   Potassium  3.3 3.5-5.1 mmol/L   Chloride 103  mmol/L   Ionized Calcium 1.28 1.06-1.42 mmol/L   CO2 (measured) 27 23-29 mmol/L   Glucose 132  mg/dL   BUN 19 6-30 mg/dL   Creatinine 0.9 0.5-1.4 mg/dL   Hematocrit 39 36-54%

## 2020-09-04 ENCOUNTER — OFFICE VISIT (OUTPATIENT)
Dept: INTERNAL MEDICINE | Facility: CLINIC | Age: 50
End: 2020-09-04
Payer: MEDICARE

## 2020-09-04 ENCOUNTER — TELEPHONE (OUTPATIENT)
Dept: INTERNAL MEDICINE | Facility: CLINIC | Age: 50
End: 2020-09-04

## 2020-09-04 VITALS
BODY MASS INDEX: 18.81 KG/M2 | TEMPERATURE: 98 F | OXYGEN SATURATION: 98 % | DIASTOLIC BLOOD PRESSURE: 80 MMHG | SYSTOLIC BLOOD PRESSURE: 110 MMHG | WEIGHT: 117.06 LBS | HEIGHT: 66 IN | HEART RATE: 86 BPM

## 2020-09-04 DIAGNOSIS — G25.71 AKATHISIA: Primary | ICD-10-CM

## 2020-09-04 DIAGNOSIS — E87.0 HYPERNATREMIA: ICD-10-CM

## 2020-09-04 DIAGNOSIS — R93.89 ABNORMAL CHEST X-RAY: ICD-10-CM

## 2020-09-04 DIAGNOSIS — E87.6 HYPOKALEMIA: ICD-10-CM

## 2020-09-04 PROCEDURE — 99214 OFFICE O/P EST MOD 30 MIN: CPT | Mod: S$PBB,,, | Performed by: FAMILY MEDICINE

## 2020-09-04 PROCEDURE — 99215 OFFICE O/P EST HI 40 MIN: CPT | Mod: PBBFAC | Performed by: FAMILY MEDICINE

## 2020-09-04 PROCEDURE — 99999 PR PBB SHADOW E&M-EST. PATIENT-LVL V: ICD-10-PCS | Mod: PBBFAC,,, | Performed by: FAMILY MEDICINE

## 2020-09-04 PROCEDURE — 99214 PR OFFICE/OUTPT VISIT, EST, LEVL IV, 30-39 MIN: ICD-10-PCS | Mod: S$PBB,,, | Performed by: FAMILY MEDICINE

## 2020-09-04 PROCEDURE — 99999 PR PBB SHADOW E&M-EST. PATIENT-LVL V: CPT | Mod: PBBFAC,,, | Performed by: FAMILY MEDICINE

## 2020-09-04 RX ORDER — AMOXICILLIN AND CLAVULANATE POTASSIUM 875; 125 MG/1; MG/1
1 TABLET, FILM COATED ORAL EVERY 12 HOURS
Qty: 14 TABLET | Refills: 0 | Status: SHIPPED | OUTPATIENT
Start: 2020-09-04 | End: 2020-09-11

## 2020-09-04 NOTE — TELEPHONE ENCOUNTER
Pls notify pt's caregiver that I spoke with Dr. Patterson who recommended treating for possible aspiration pneumonia regarding abnormal chest X-ray as patient is at increased risk for this.  Sent in Rx for Augmentin 875-125 mg PO BID for 7 days to pharmacy on file electronically.  Recommend monitoring for any fevers/chills, cough, SOB/difficulty breathing; if so recommend f/u appt at that time or emergent medical evaluation if symptoms severe.

## 2020-09-04 NOTE — PROGRESS NOTES
"Subjective:      Patient ID: Bernard Olvera is a 50 y.o. male.    Chief Complaint: Tremors      HPI:  Bernard Olvera is a 50 year old male with agitation, akathisia, glucose intolerance, HLD, hypothyroidism, impulse control disorder, profound intellectual disability, and vitamin D deficiency who presents to clinic today due to tremors.    Patient new to me; PCP is Dr. Elaina Patterson.  Patient is non-verbal and accompanied by his nurse today.    Seen in emergency department yesterday due to episodes of shaking/tremors.  Per ED report, patient's caregiver stated the patient was having spasms of his entire body yesterday and looked like he was scared.  Reported the patient was very fidgety in the ED which is not normal for him.  Reported he was taking his medications including Lamictal for seizures as well as Ativan TID.  Pt was fidgety on exam without convulsive activity.  ED provider noted, "I witnessed the episodes the caregiver is concerned about, it does not appear consistent with seizure, myoclonic jerking, or other muscle spasm.  Differential is of course quite broad for his change in behavior, and could include electrolyte abnormality, dehydration, urinary tract infection, other infection, or pneumonia versus aspiration.  His vital signs are reassuring, do not think this is contributing.  Plan for labs including urinalysis and chest x-ray for further management.  Chest x-ray by my independent interpretation is unremarkable.  Initial labs are notable for a profoundly elevated lactate, but with a CMP that is not suggestive of an anion gap acidosis, I think this may be a spurious lab.  We repeated it, and it is normal.  His urinalysis is also unremarkable.  He has a primary care appointment tomorrow, I think further workup can be deferred until then as needed."    Chest X-ray showed "On this single AP view there is a faint 1.5 x 0.5 cm opacity projected over the left lower lung zone at the anterior aspect of " "the 5th rib and the posterior portion of the 9th rib.  I detect no corresponding abnormality on earlier studies cited above.  It may represent artifact, subsegmental atelectasis, subsegmental inflammation or postinflammatory change.  However the possibility of neoplasm, primary or metastatic, cannot be excluded on the basis of this single study."    ED labs:  Na 146, K 3.4.  Lactic acid 7.1, repeat to 1.1.    Caregiver Daniela noted patient was having facial and head ticks/tremors.  First time she has noticed this in 2 years.  Denies recurrence since ED visit yesterday.  Seems back to baseline currently.  Caregiver denies patient having any cough, displaying any increased work of breathing, fevers/chills.      Past Medical History:   Diagnosis Date    Behavioral problem     Glucose intolerance (impaired glucose tolerance) 11/20/2013    History of psychiatric care     Hyperlipidemia 11/28/2012    Hypothyroidism 11/28/2012    Insulin resistance 3/27/2014    Liver disease     fatty infiltration, prior Hepatitis B exposure    Mental retardation     Moderate mental retardation 11/28/2012    Personal history of seizure disorder     Profound mental retardation in adult     Psychiatric problem     Seizure disorder 11/28/2012    Self-injurious behavior     Therapy        Past Surgical History:   Procedure Laterality Date    HERNIA REPAIR         Family History   Family history unknown: Yes       Social History     Socioeconomic History    Marital status: Single     Spouse name: Not on file    Number of children: 0    Years of education: Not on file    Highest education level: Not on file   Occupational History    Not on file   Social Needs    Financial resource strain: Not on file    Food insecurity     Worry: Not on file     Inability: Not on file    Transportation needs     Medical: Not on file     Non-medical: Not on file   Tobacco Use    Smoking status: Never Smoker    Smokeless tobacco: Never " Used   Substance and Sexual Activity    Alcohol use: No    Drug use: No    Sexual activity: Never   Lifestyle    Physical activity     Days per week: Not on file     Minutes per session: Not on file    Stress: Not on file   Relationships    Social connections     Talks on phone: Not on file     Gets together: Not on file     Attends Pentecostal service: Not on file     Active member of club or organization: Not on file     Attends meetings of clubs or organizations: Not on file     Relationship status: Not on file   Other Topics Concern    Caffeine Use Not Asked    Financial Status: Disabled Not Asked    Legal: Involved in criminal litigation Not Asked    Caffeine Use: Frequent Not Asked    Financial Status: Employed Not Asked    Legal: Other Not Asked    Caffeine Use: Moderate Not Asked    Financial Status: Unemployed Not Asked    Leisure: Exercise Not Asked    Caffeine Use: Substantial Not Asked    Financial Status: Other Not Asked    Leisure: Fishing Not Asked    Childhood History: Adopted Not Asked    Firearms: Does patient have access to a firearm? No    Leisure: Hunting Not Asked    Childhood History: Early trauma Not Asked    Home situation: homeless Not Asked    Leisure: Movie Watching Not Asked    Childhood History: Raised by parents Not Asked    Home situation: lives alone Not Asked    Leisure: Shopping Not Asked    Childhood History: Uneventful Not Asked    Home situation: lives in group home Yes    Leisure: Sports Not Asked    Childhood History: Other Not Asked    Home situation: lives in nursing home Not Asked    Leisure: Time with family Not Asked    Education: Unfinished High School Not Asked    Home situation: lives in shelter Not Asked     Service Not Asked    Education: High School Graduate Not Asked    Home situation: lives with family Not Asked    Spirituality: Active Participation Not Asked    Education: Unfinished college Not Asked    Home  "situation: lives with friends Not Asked    Spirituality: Organized Restoration Not Asked    Education: Trade School Not Asked    Home situation: lives with significant other Not Asked    Spirituality: Private Participation Not Asked    Education: Associate's Degree Not Asked    Home situation: lives with spouse Not Asked    Patient feels they ought to cut down on drinking/drug use Not Asked    Education: Bachelor's Degree Not Asked    Legal consequences of chemical use Not Asked    Patient annoyed by others criticizing their drinking/drug use Not Asked    Education: More than one Bachelor's or Professional Not Asked    Legal: Arrest history Not Asked    Patient has felt bad or guilty about drinking/drug use Not Asked    Education: Master's, PhD Not Asked    Legal: Involved in civil litigation Not Asked    Patient has had a drink/used drugs as an eye opener in the AM Not Asked   Social History Narrative    Not on file       Review of Systems   Unable to perform ROS: Patient nonverbal     Objective:     Vitals:    09/04/20 1055   BP: 110/80   BP Location: Right arm   Patient Position: Sitting   BP Method: Medium (Manual)   Pulse: 86   Temp: 97.7 °F (36.5 °C)   TempSrc: Oral   SpO2: 98%   Weight: 53.1 kg (117 lb 1 oz)   Height: 5' 6" (1.676 m)       Physical Exam  Vitals signs and nursing note reviewed.   Constitutional:       General: He is not in acute distress.  HENT:      Head: Atraumatic.      Right Ear: Hearing and external ear normal.      Left Ear: Hearing and external ear normal.      Nose: Nose normal. No rhinorrhea.      Mouth/Throat:      Mouth: No oral lesions.   Eyes:      General: Lids are normal.         Right eye: No discharge.         Left eye: No discharge.      Conjunctiva/sclera: Conjunctivae normal.   Neck:      Musculoskeletal: Neck supple.      Trachea: Trachea normal. No tracheal deviation.   Cardiovascular:      Rate and Rhythm: Normal rate and regular rhythm.      Heart sounds: " Normal heart sounds. No murmur. No friction rub. No gallop.    Pulmonary:      Effort: Pulmonary effort is normal. No respiratory distress.      Breath sounds: Normal breath sounds. No wheezing or rales.   Abdominal:      General: Bowel sounds are normal. There is no distension.      Palpations: Abdomen is soft.      Tenderness: There is no abdominal tenderness. There is no guarding or rebound.   Skin:     General: Skin is warm and dry.      Findings: No rash.   Neurological:      Mental Status: He is alert.      Motor: No abnormal muscle tone.      Comments: Frequently gets up from his chair but no tremor noted on exam today.   Psychiatric:         Speech: Speech normal.         Behavior: Behavior normal. Behavior is cooperative.         Thought Content: Thought content normal.         Judgment: Judgment normal.        Assessment:      1. Akathisia    2. Hypernatremia    3. Hypokalemia      Plan:   Bernard was seen today for tremors.    Diagnoses and all orders for this visit:    Akathisia  -     TSH; Future  -     Ambulatory referral/consult to Neurology; Future  -     Suspect symptoms were related to Rx side effect.  Improved.  Continue current regimen at present.  Referred to neurology if recurrent.  Do not feel he needs any treatment for this at this time (e.g. BB or benzo) as symptoms have improved.    Hypernatremia  -     Basic metabolic panel; Future in 1 week    Hypokalemia  -     Basic metabolic panel; Future in 1 week    Abnormal chest X-ray  -     No signs/symptoms of PNA on exam today though respiratory exam is limited.  Would not be able to sit for CT without sedation.  Will message PCP to see if she thinks CT chest is indicated at this time; will notify caregiver once I hear back.  For now recommend monitoring for the development of any associated signs/symptoms.    Addendum:  Discussed with PCP who recommended treating for possible aspiration PNA 2/2 being at risk for aspiration as he eats very quickly.   Sent in Rx for Augmentin 875-125 mg PO BID for 7 days.  Patient's caregiver to be notified per nursing.

## 2020-10-23 ENCOUNTER — OFFICE VISIT (OUTPATIENT)
Dept: PSYCHIATRY | Facility: CLINIC | Age: 50
End: 2020-10-23
Payer: MEDICARE

## 2020-10-23 VITALS
BODY MASS INDEX: 18.61 KG/M2 | WEIGHT: 115.31 LBS | SYSTOLIC BLOOD PRESSURE: 129 MMHG | HEART RATE: 80 BPM | DIASTOLIC BLOOD PRESSURE: 80 MMHG

## 2020-10-23 DIAGNOSIS — F63.9 IMPULSE CONTROL DISORDER: Primary | ICD-10-CM

## 2020-10-23 DIAGNOSIS — F73 PROFOUND INTELLECTUAL DISABILITY: ICD-10-CM

## 2020-10-23 DIAGNOSIS — G40.909 SEIZURE DISORDER: ICD-10-CM

## 2020-10-23 DIAGNOSIS — F29 PSYCHOSIS, UNSPECIFIED PSYCHOSIS TYPE: ICD-10-CM

## 2020-10-23 DIAGNOSIS — R45.1 AGITATION: ICD-10-CM

## 2020-10-23 PROCEDURE — 99214 PR OFFICE/OUTPT VISIT, EST, LEVL IV, 30-39 MIN: ICD-10-PCS | Mod: S$PBB,,, | Performed by: PSYCHIATRY & NEUROLOGY

## 2020-10-23 PROCEDURE — 99999 PR PBB SHADOW E&M-EST. PATIENT-LVL II: ICD-10-PCS | Mod: PBBFAC,,, | Performed by: PSYCHIATRY & NEUROLOGY

## 2020-10-23 PROCEDURE — 99212 OFFICE O/P EST SF 10 MIN: CPT | Mod: PBBFAC | Performed by: PSYCHIATRY & NEUROLOGY

## 2020-10-23 PROCEDURE — 99214 OFFICE O/P EST MOD 30 MIN: CPT | Mod: S$PBB,,, | Performed by: PSYCHIATRY & NEUROLOGY

## 2020-10-23 PROCEDURE — 99999 PR PBB SHADOW E&M-EST. PATIENT-LVL II: CPT | Mod: PBBFAC,,, | Performed by: PSYCHIATRY & NEUROLOGY

## 2020-10-23 RX ORDER — LORAZEPAM 2 MG/1
2 TABLET ORAL 2 TIMES DAILY
Qty: 60 TABLET | Refills: 5 | Status: CANCELLED | OUTPATIENT
Start: 2020-10-23

## 2020-10-23 NOTE — PROGRESS NOTES
"Outpatient Psychiatry Follow-Up Visit (MD/NP)   10/23/2020    Clinical Status of Patient: Outpatient (Ambulatory)     Session Length:  30 minutes (E&M)      Chief Complaint: Bernard Olvera is a 50 y.o. male who presents today for follow-up of impulsivity, irritibility, mental retardation.   Met with patient and 1 caregiver.     Interval History and Content of Current Session:   General impression:  First appointment with me since 2/6/2020.  History of interim events is obtained from [facility], as pt. cannot communicate verbally -- see below.      Target symptoms: Impulsivity, irritibility, voracious appetite, mental retardation.     Interim events:    Med plan at last appt:  "Continue all current medications with refills as noted."      Resident of  -- he lives in an apt with 24 hour caregivers.  He goes to a day center in the AM.    His caregiver who is present gives collateral Hx, as pt is near mute and is unable to provide any meaningful information.      He can feed self and toilet, brushes his teeth; he needs some assistance with donning clothes correctly.    He likes to watch certain shows on TV, mainly cartoons.  He has toys, stuffed animals.    He will occasionally hit his hand vs his head, or vs the wall or door, likely out of boredom more than anything else.    No verbal or physical violence towards anyone recently.    She states he still picks at skin to the point of drawing blood.    He has been responding fairly well to redirection.      He has been sleeping well at night.    He had some problems with vomiting at the center (not lately); caregiver states he will eat too fast at times, which likely causes gagging and regurgitation of food.  He has to be prompted to eat more slowly, to chew his food.      She denies any recent rumination of food.  No difficulty swallowing chewed food.    Per EMR, he has recently lost wt (see below).  I rechecked his wt on a bathroom scale in session = 117#.  "   Caregiver states pt has been eating very well -- this does not correspond with his wt loss.    Noted pt had CXR last month that showed an unexplained opacity in LLL.  I recommended pt see Dr. Patterson for f/u soon, as this finding may need more investigation.      He has been compliant taking his meds as below.          He is at the [address] (on [address]) -- he is NOT with [facility] as before.      They give him all of his night meds around 8:30 PM.      He does not speak -- he only makes grunting sounds.      Review of Systems   · PSYCHIATRIC: Pertinant items are noted in the narrative.  · CONSTITUTIONAL: Recent wt loss.  · MUSCULOSKELETAL: No pain or stiffness of the joints.  · NEUROLOGIC: No weakness, sensory changes, seizures, tremor or other abnormal movements.  · ENDOCRINE: No polydipsia or polyuria.  · INTEGUMENTARY: no rash, no lacerations.  · EYES: No exophthalmos, jaundice or blindness.  · ENT: No dizziness, tinnitus or hearing loss.  · RESPIRATORY: No shortness of breath.  · CARDIOVASCULAR: No tachycardia or chest pain.  · GASTROINTESTINAL: No nausea, vomiting, pain, constipation or diarrhea.  · GENITOURINARY: No frequency, dysuria.         Current Outpatient Medications:     docusate sodium (COLACE) 100 MG capsule, Take 100 mg by mouth 2 (two) times daily., Disp: , Rfl:     ibuprofen (ADVIL,MOTRIN) 600 MG tablet, Take 1 tablet (600 mg total) by mouth every 6 (six) hours as needed for Pain., Disp: 20 tablet, Rfl: 0    ketoconazole (NIZORAL) 2 % shampoo, USE TO WASH HAIR TWICE A WEEK AS DIRECTED, Disp: 240 mL, Rfl: 4    lamoTRIgine (LAMICTAL) 100 MG tablet, TAKE 1 TABLET BY MOUTH twice a day (8am/8pm), Disp: 60 tablet, Rfl: 6    levothyroxine (SYNTHROID) 50 MCG tablet, TAKE 1 TAB  BY MOUTH BEFORE BREAKFAST DAILY AT 8AM, Disp: 90 tablet, Rfl: 0    LORazepam (ATIVAN) 2 MG Tab, Take 1 tablet (2 mg total) by mouth 2 (two) times daily. Take oral one tablet at 7 AM and noon daily, Disp: 60  tablet, Rfl: 5    multivitamin capsule, Take 1 capsule by mouth once daily., Disp: 30 capsule, Rfl: 11    olanzapine zydis (ZYPREXA) 10 MG TbDL, TAKE 1 TABLET BY MOUTH EVERY EVENING., Disp: 30 tablet, Rfl: 4    omega-3 fatty acids-vitamin E 1,000 mg Cap, One tablet twrosalinda dixon, Disp: 60 each, Rfl: 11    paliperidone (INVEGA) 3 MG TR24, TAKE 1 TAB BY MOUTH EVERY DAY, Disp: 30 tablet, Rfl: 3    pantoprazole (PROTONIX) 40 MG tablet, TAKE 1 TAB  BY MOUTH ONCE DAILY AT 8AM, Disp: 90 tablet, Rfl: 0    polyethylene glycol (GLYCOLAX) 17 gram/dose powder, Take 17 g by mouth once daily., Disp: 595 g, Rfl: 3    propranoloL (INDERAL LA) 60 MG 24 hr capsule, TAKE 1 CAP BY MOUTH ONCE DAILY AT 8PM, Disp: 90 capsule, Rfl: 1    QUEtiapine (SEROQUEL) 100 MG Tab, TAKE ORALLY ONE TABLET AT 7AM AND ONE TABLET AT NOON, Disp: 60 tablet, Rfl: 6    QUEtiapine (SEROQUEL) 25 MG Tab, Take 2 tablets (50 mg total) by mouth every 6 (six) hours as needed., Disp: 120 tablet, Rfl: 6    QUEtiapine (SEROQUEL) 50 MG tablet, TAKE ORAL 1 TABLET AT 7PM NIGHTLY., Disp: 30 tablet, Rfl: 6    ranitidine (ZANTAC) 300 MG tablet, TAKE 1 TABLET BY MOUTH once daily at 8pm, Disp: 90 tablet, Rfl: 3    senna (SENOKOT) 8.6 mg tablet, Take 2 tablets by mouth once daily., Disp: , Rfl:     VITAMIN D3 1,000 unit tablet, TAKE 1 TABLET BY MOUTH EVERY DAY, Disp: 30 tablet, Rfl: 2    See above -- pt is still receiving Seroquel 50 mg one tablet QID scheduled (7 am/noon/7 pm/qhs).    Compliance: Yes     Side effects: None     Risk Parameters:   Patient reports no suicidal ideation   Patient reports no homicidal ideation   Patient reports no self-injurious behavior   Patient reports no violent behavior     Patient's Response to Intervention:   The patient's response to intervention is accepting.     Progress Toward Goals and Other Mental Status Changes:   The patient's progress toward goals is limited.     Vitals: Most recent vital signs, dated today just prior to  "this appointment, were reviewed:     Vitals - 1 value per visit 2/15/2020 9/3/2020 9/4/2020 10/23/2020   SYSTOLIC 132  110 129   DIASTOLIC 69  80 80   PULSE 90 84 86 80   TEMPERATURE 97.8 98 97.7    RESPIRATIONS 20 16     SPO2 98 99 98    Weight (lb)  132* 117.06* 115.3   Weight (kg)  59.875 53.1 52.3   HEIGHT   5' 6"    BODY MASS INDEX  21.31 18.89 18.61   VISIT REPORT       Pain Score    0    *doubtful his wt dropped by 15# in one day    Vitals - 1 value per visit 11/9/2019 11/11/2019 11/12/2019 2/6/2020   SYSTOLIC 126 120 110 120   DIASTOLIC 80 86 72 56   PULSE 99 97 98 78   TEMPERATURE 98.3 98.6 97.6    RESPIRATIONS 18  18    SPO2 100 98 98    Weight (lb)  134.37 140 132.61   Weight (kg)  60.95 63.504 60.15   HEIGHT  5' 10" 5' 6" 5' 6"   BODY MASS INDEX  19.28 22.6 21.4   VISIT REPORT       Pain Score   0         Vitals - 1 value per visit 3/20/2019 4/5/2019 4/16/2019 5/10/2019   SYSTOLIC 120 133 100 123   DIASTOLIC 75 78 68 80   PULSE 83 86 89 84   TEMPERATURE       RESPIRATIONS       SPO2       Weight (lb) 126.1 132.06 133.16 142.64   Weight (kg) 57.2 59.9 60.4 64.7   HEIGHT  6' 0" 5' 10"    BODY MASS INDEX 17.1 17.91 19.11 20.47   VISIT REPORT       Pain Score    0        Vitals - 1 value per visit 1/8/2018 3/8/2018 5/14/2018 5/17/2018   SYSTOLIC 138 121 126 98   DIASTOLIC 71 74 84 66   PULSE 84 78 76 83   TEMPERATURE 97.2  98.1 98.9   RESPIRATIONS 18  17    SPO2 98  98 97   Weight (lb) 153.88 151.13 140 148.81   Weight (kg) 69.8 68.55 63.504 67.5   HEIGHT 5' 8" 5' 8" 5' 7" 5' 7"   BODY MASS INDEX 23.4 22.98 21.93 23.31   VISIT REPORT       Pain Score     0       Labs:    Admission on 09/03/2020, Discharged on 09/03/2020   Component Date Value Ref Range Status    WBC 09/03/2020 7.80  3.90 - 12.70 K/uL Final    RBC 09/03/2020 4.37* 4.60 - 6.20 M/uL Final    Hemoglobin 09/03/2020 12.9* 14.0 - 18.0 g/dL Final    Hematocrit 09/03/2020 41.5  40.0 - 54.0 % Final    Mean Corpuscular Volume 09/03/2020 95  82 - " 98 fL Final    Mean Corpuscular Hemoglobin 09/03/2020 29.5  27.0 - 31.0 pg Final    Mean Corpuscular Hemoglobin Conc 09/03/2020 31.1* 32.0 - 36.0 g/dL Final    RDW 09/03/2020 11.9  11.5 - 14.5 % Final    Platelets 09/03/2020 241  150 - 350 K/uL Final    MPV 09/03/2020 10.0  9.2 - 12.9 fL Final    Immature Granulocytes 09/03/2020 0.3  0.0 - 0.5 % Final    Gran # (ANC) 09/03/2020 5.6  1.8 - 7.7 K/uL Final    Immature Grans (Abs) 09/03/2020 0.02  0.00 - 0.04 K/uL Final    Lymph # 09/03/2020 1.6  1.0 - 4.8 K/uL Final    Mono # 09/03/2020 0.5  0.3 - 1.0 K/uL Final    Eos # 09/03/2020 0.0  0.0 - 0.5 K/uL Final    Baso # 09/03/2020 0.05  0.00 - 0.20 K/uL Final    nRBC 09/03/2020 0  0 /100 WBC Final    Gran% 09/03/2020 71.4  38.0 - 73.0 % Final    Lymph% 09/03/2020 20.3  18.0 - 48.0 % Final    Mono% 09/03/2020 6.9  4.0 - 15.0 % Final    Eosinophil% 09/03/2020 0.5  0.0 - 8.0 % Final    Basophil% 09/03/2020 0.6  0.0 - 1.9 % Final    Differential Method 09/03/2020 Automated   Final    Sodium 09/03/2020 146* 136 - 145 mmol/L Final    Potassium 09/03/2020 3.4* 3.5 - 5.1 mmol/L Final    Chloride 09/03/2020 106  95 - 110 mmol/L Final    CO2 09/03/2020 26  23 - 29 mmol/L Final    Glucose 09/03/2020 128* 70 - 110 mg/dL Final    BUN, Bld 09/03/2020 17  6 - 20 mg/dL Final    Creatinine 09/03/2020 0.9  0.5 - 1.4 mg/dL Final    Calcium 09/03/2020 10.0  8.7 - 10.5 mg/dL Final    Total Protein 09/03/2020 7.2  6.0 - 8.4 g/dL Final    Albumin 09/03/2020 4.2  3.5 - 5.2 g/dL Final    Total Bilirubin 09/03/2020 0.7  0.1 - 1.0 mg/dL Final    Alkaline Phosphatase 09/03/2020 65  55 - 135 U/L Final    AST 09/03/2020 15  10 - 40 U/L Final    ALT 09/03/2020 13  10 - 44 U/L Final    Anion Gap 09/03/2020 14  8 - 16 mmol/L Final    eGFR if African American 09/03/2020 >60.0  >60 mL/min/1.73 m^2 Final    eGFR if non African American 09/03/2020 >60.0  >60 mL/min/1.73 m^2 Final    Lactate (Lactic Acid) 09/03/2020 7.1*  0.5 - 2.2 mmol/L Final    Troponin I 09/03/2020 <0.006  0.000 - 0.026 ng/mL Final    Specimen UA 09/03/2020 Urine, Clean Catch   Final    Color, UA 09/03/2020 Yellow  Yellow, Straw, Jennifer Final    Appearance, UA 09/03/2020 Clear  Clear Final    pH, UA 09/03/2020 6.0  5.0 - 8.0 Final    Specific [place], UA 09/03/2020 1.015  1.005 - 1.030 Final    Protein, UA 09/03/2020 Negative  Negative Final    Glucose, UA 09/03/2020 Negative  Negative Final    Ketones, UA 09/03/2020 Negative  Negative Final    Bilirubin (UA) 09/03/2020 Negative  Negative Final    Occult Blood UA 09/03/2020 Negative  Negative Final    Nitrite, UA 09/03/2020 Negative  Negative Final    Leukocytes, UA 09/03/2020 Negative  Negative Final    POC Glucose 09/03/2020 132* 70 - 110 mg/dL Final    POC BUN 09/03/2020 19  6 - 30 mg/dL Final    POC Creatinine 09/03/2020 0.9  0.5 - 1.4 mg/dL Final    POC Sodium 09/03/2020 145  136 - 145 mmol/L Final    POC Potassium 09/03/2020 3.3* 3.5 - 5.1 mmol/L Final    POC Chloride 09/03/2020 103  95 - 110 mmol/L Final    POC TCO2 (MEASURED) 09/03/2020 27  23 - 29 mmol/L Final    POC Ionized Calcium 09/03/2020 1.28  1.06 - 1.42 mmol/L Final    POC Hematocrit 09/03/2020 39  36 - 54 %PCV Final    Sample 09/03/2020 EBONY   Final    Lactate (Lactic Acid) 09/03/2020 1.1  0.5 - 2.2 mmol/L Final         Mental Status Evaluation      Appearance:  casually dressed, disheveled   Behavior:  Not fully cooperative, impulsive, but mostly calm and redirectable, eye contact minimal, occasionally grins   Speech:  no intelligible speech; sometimes makes noises: grunts, moans    Mood:  Unable to assess; patient cannot verbally communicate    Affect:  Euthymic to mildly irritible at times, blunted, constricted   Thought Process:  Profound poverty of thought    Thought Content:  normal, no suicidality, no homicidality, delusions, or paranoia    Sensorium:  Unable to assess; patient cannot verbally communicate   Attention  Span & Concentration  Poor    Cognition:  severely impaired    Insight:  very poor    Judgment:  very poor      AIMS Screening:      Facial and Oral Movements  Muscles of Facial Expression: Mild(blunting)  Lips and Perioral Area: (pt not cooperative with exam)  Jaw: (pt not cooperative with exam; no abnormalities noted)  Tongue: (pt not cooperative with exam)    Extremity Movements  Upper (arms, wrists, hands, fingers): None, normal(no cogwheel rigidity noted)  Lower (legs, knees, ankles, toes): None, normal(no shuffling of gait noted)    Trunk Movements  Neck, shoulders, hips: None, normal    Overall Severity  Severity of abnormal movements (highest score from questions above): 2  Incapacitation due to abnormal movements: None, normal  Patient's awareness of abnormal movements (rate only patient's report): (unable to voice any awareness)    Dental Status  Current problems with teeth and/or dentures?: No  Does patient usually wear dentures?: No       Impression:   Impulse-Control Disorder, unspecified  Intellectual Disability, profound  Unspecified Psychosis -- only for coding purposes for insurance coverage of pt's neuroleptic meds   Akathisia, improving (NOT CODED)   Seizure Disorder   Hx of Fatty liver with elevated liver enzymes (NOT CODED)    Plan:  Medication management:  Continue all current medications with refills as noted.           Additional Notes:  Follow up with Dr. Elaina Patterson for medical issues.     Return to Clinic: 6 months, or sooner prn.    AIMS DONE TODAY.  NEXT AIMS DUE 04/'21.

## 2020-11-03 RX ORDER — LAMOTRIGINE 100 MG/1
TABLET ORAL
Qty: 60 TABLET | Refills: 6 | Status: SHIPPED | OUTPATIENT
Start: 2020-11-03 | End: 2021-07-08

## 2020-11-03 RX ORDER — OLANZAPINE 10 MG/1
10 TABLET, ORALLY DISINTEGRATING ORAL NIGHTLY
Qty: 30 TABLET | Refills: 6 | Status: SHIPPED | OUTPATIENT
Start: 2020-11-03 | End: 2021-07-20

## 2020-11-03 RX ORDER — PALIPERIDONE 3 MG/1
TABLET, EXTENDED RELEASE ORAL
Qty: 30 TABLET | Refills: 6 | Status: SHIPPED | OUTPATIENT
Start: 2020-11-03 | End: 2022-02-04 | Stop reason: SDUPTHER

## 2020-11-03 RX ORDER — QUETIAPINE FUMARATE 100 MG/1
TABLET, FILM COATED ORAL
Qty: 60 TABLET | Refills: 6 | Status: SHIPPED | OUTPATIENT
Start: 2020-11-03 | End: 2021-11-09

## 2020-11-03 RX ORDER — QUETIAPINE FUMARATE 50 MG/1
TABLET, FILM COATED ORAL
Qty: 30 TABLET | Refills: 6 | Status: SHIPPED | OUTPATIENT
Start: 2020-11-03 | End: 2021-09-07

## 2020-11-03 RX ORDER — QUETIAPINE FUMARATE 25 MG/1
50 TABLET, FILM COATED ORAL EVERY 6 HOURS PRN
Qty: 120 TABLET | Refills: 6 | Status: SHIPPED | OUTPATIENT
Start: 2020-11-03 | End: 2022-01-18 | Stop reason: SDUPTHER

## 2020-11-13 DIAGNOSIS — R25.1 TREMOR: ICD-10-CM

## 2020-11-16 RX ORDER — PROPRANOLOL HYDROCHLORIDE 60 MG/1
CAPSULE, EXTENDED RELEASE ORAL
Qty: 30 CAPSULE | Refills: 4 | Status: SHIPPED | OUTPATIENT
Start: 2020-11-16 | End: 2021-04-02

## 2020-11-19 ENCOUNTER — OFFICE VISIT (OUTPATIENT)
Dept: INTERNAL MEDICINE | Facility: CLINIC | Age: 50
End: 2020-11-19
Payer: MEDICARE

## 2020-11-19 VITALS
TEMPERATURE: 98 F | HEART RATE: 90 BPM | WEIGHT: 115.5 LBS | SYSTOLIC BLOOD PRESSURE: 117 MMHG | BODY MASS INDEX: 18.56 KG/M2 | DIASTOLIC BLOOD PRESSURE: 72 MMHG | HEIGHT: 66 IN | RESPIRATION RATE: 18 BRPM

## 2020-11-19 DIAGNOSIS — E78.49 OTHER HYPERLIPIDEMIA: Primary | ICD-10-CM

## 2020-11-19 DIAGNOSIS — G40.909 SEIZURE DISORDER: ICD-10-CM

## 2020-11-19 DIAGNOSIS — E88.819 INSULIN RESISTANCE: ICD-10-CM

## 2020-11-19 DIAGNOSIS — E02 SUBCLINICAL IODINE-DEFICIENCY HYPOTHYROIDISM: ICD-10-CM

## 2020-11-19 DIAGNOSIS — Z79.899 HIGH RISK MEDICATION USE: ICD-10-CM

## 2020-11-19 PROCEDURE — 99999 PR PBB SHADOW E&M-EST. PATIENT-LVL III: CPT | Mod: PBBFAC,GC,, | Performed by: STUDENT IN AN ORGANIZED HEALTH CARE EDUCATION/TRAINING PROGRAM

## 2020-11-19 PROCEDURE — 99213 OFFICE O/P EST LOW 20 MIN: CPT | Mod: PBBFAC | Performed by: STUDENT IN AN ORGANIZED HEALTH CARE EDUCATION/TRAINING PROGRAM

## 2020-11-19 PROCEDURE — 99213 OFFICE O/P EST LOW 20 MIN: CPT | Mod: S$PBB,GC,, | Performed by: STUDENT IN AN ORGANIZED HEALTH CARE EDUCATION/TRAINING PROGRAM

## 2020-11-19 PROCEDURE — 99999 PR PBB SHADOW E&M-EST. PATIENT-LVL III: ICD-10-PCS | Mod: PBBFAC,GC,, | Performed by: STUDENT IN AN ORGANIZED HEALTH CARE EDUCATION/TRAINING PROGRAM

## 2020-11-19 PROCEDURE — 99213 PR OFFICE/OUTPT VISIT, EST, LEVL III, 20-29 MIN: ICD-10-PCS | Mod: S$PBB,GC,, | Performed by: STUDENT IN AN ORGANIZED HEALTH CARE EDUCATION/TRAINING PROGRAM

## 2020-11-19 NOTE — PROGRESS NOTES
Internal Medicine Initial Clinic Visit    Name: Bernard Olvera  MRN: [id number]  : 1970    Subjective:     Chief Complaint: paperwork, physical    History of Present Illness: Mr. Bernard Olvera is a 51 yo M w/ a profound MR who presents to clinic today for his 90-L paperwork and physical exam. At last visit, his PCP ordered a lipid profile, which was not done due to patient refusal. Pt's caregiver asking if we could reorder this lab plus a depakote level due to pt's recent weight loss. Pt weighs 115lbs at today's visit. Pt's caregiver reports no further issues. He takes all of his medicines daily and has no problems with medications.      Review of Systems:   As per the HPI, otherwise negative for constitutional, HEENT, cardiac, respiratory, gastrointestinal, renal, endocrine, musculoskeletal, neurologic, cutaneous, hematologic, and lymphatic systems.    Review of Systems   Unable to perform ROS: Psychiatric disorder       Past History:  The patient's allergies, medications, past medical, surgical and social histories are reviewed and updated in the computerized record today.    Past Medical History:   Diagnosis Date    Behavioral problem     Glucose intolerance (impaired glucose tolerance) 2013    History of psychiatric care     Hyperlipidemia 2012    Hypothyroidism 2012    Insulin resistance 3/27/2014    Liver disease     fatty infiltration, prior Hepatitis B exposure    Mental retardation     Moderate mental retardation 2012    Personal history of seizure disorder     Profound mental retardation in adult     Psychiatric problem     Seizure disorder 2012    Self-injurious behavior     Therapy          Past Surgical History:   Procedure Laterality Date    HERNIA REPAIR           Family History   Family history unknown: Yes       Social History:   reports that he has never smoked. He has never used smokeless tobacco. He reports that he does not drink alcohol or  use drugs.    Current Medications:  Current Outpatient Medications   Medication Sig Dispense Refill    docusate sodium (COLACE) 100 MG capsule Take 100 mg by mouth 2 (two) times daily.      ibuprofen (ADVIL,MOTRIN) 600 MG tablet Take 1 tablet (600 mg total) by mouth every 6 (six) hours as needed for Pain. 20 tablet 0    ketoconazole (NIZORAL) 2 % shampoo USE TO WASH HAIR TWICE A WEEK AS DIRECTED 240 mL 4    lamoTRIgine (LAMICTAL) 100 MG tablet TAKE 1 TABLET BY MOUTH twice a day (8am/8pm) 60 tablet 6    levothyroxine (SYNTHROID) 50 MCG tablet TAKE 1 TAB  BY MOUTH BEFORE BREAKFAST DAILY AT 8AM 90 tablet 0    LORazepam (ATIVAN) 2 MG Tab Take 1 tablet (2 mg total) by mouth 2 (two) times daily. Take oral one tablet at 7 AM and noon daily 60 tablet 5    multivitamin capsule Take 1 capsule by mouth once daily. 30 capsule 11    olanzapine zydis (ZYPREXA) 10 MG TbDL Take 1 tablet (10 mg total) by mouth every evening. 30 tablet 6    omega-3 fatty acids-vitamin E 1,000 mg Cap One tablet twic edaily 60 each 11    paliperidone (INVEGA) 3 MG TR24 TAKE 1 TAB BY MOUTH EVERY DAY 30 tablet 6    pantoprazole (PROTONIX) 40 MG tablet TAKE 1 TAB  BY MOUTH ONCE DAILY AT 8AM 90 tablet 0    polyethylene glycol (GLYCOLAX) 17 gram/dose powder Take 17 g by mouth once daily. 595 g 3    propranoloL (INDERAL LA) 60 MG 24 hr capsule TAKE 1 CAP BY MOUTH ONCE DAILY AT 8PM 30 capsule 4    QUEtiapine (SEROQUEL) 100 MG Tab TAKE ORALLY ONE TABLET AT 7AM AND ONE TABLET AT NOON 60 tablet 6    QUEtiapine (SEROQUEL) 25 MG Tab Take 2 tablets (50 mg total) by mouth every 6 (six) hours as needed. 120 tablet 6    QUEtiapine (SEROQUEL) 50 MG tablet TAKE ORAL 1 TABLET AT 7PM NIGHTLY. 30 tablet 6    ranitidine (ZANTAC) 300 MG tablet TAKE 1 TABLET BY MOUTH once daily at 8pm 90 tablet 3    senna (SENOKOT) 8.6 mg tablet Take 2 tablets by mouth once daily.      VITAMIN D3 1,000 unit tablet TAKE 1 TABLET BY MOUTH EVERY DAY 30 tablet 2     No current  "facility-administered medications for this visit.          Allergies:  Azithromycin    Objective:     Physical Exam:  Ht 5' 6" (1.676 m)   Wt 52.4 kg (115 lb 8.3 oz)   BMI 18.65 kg/m²     Gen: WD, WN, conversant in NAD  Eyes: Pupils symmetric, anicteric, no conjunctival pallor, no lid lag  E,N,M,T: Atraumatic, no cyanosis, MMM  Neck: Trachea midline. Neck supple. No masses or thyromegaly  Resp: Unlabored w/o accessory muscle use, CTAB  CV: normal rate, regular rhythm, no m/r/g; 2+ radial and PT pulses bilaterally, no edema all 4 extremities, JVP not elevated  GI: Soft, NT/ND, BS+ in all 4 quadrants  Msk: No clubbing or cyanosis; ROM wnl, muscle tone good w/o atrophy  Lymph: No cervical or extremity lymphadenopathy   Neuro: Facial symmetry, sensation intact, no focal deficits appreciated   Psych: AA, hyperactive, spontaneous decisions, easily redirectable and motivated by snacks.      Recent Labs:  Labs/Imaging/Ancillary Data: Reviewed previous labs. New orders discussed in plan.      Assessment/Plan:     Bernard was seen today for annual exam.    Diagnoses and all orders for this visit:    90-L Paperwork Completion:a  -     Lipid panel, TSH, Valproic acid levels, Hgb A1c ordered  - PE within normal limits, no gross abnormalities or changes.  - Paperwork completed and no changes made to medication list.    Other hyperlipidemia  -     Lipid Panel; Future  - l    Subclinical iodine-deficiency hypothyroidism  -     TSH; Future    Seizure disorder  -     VALPROIC ACID; Future    Insulin resistance  -     Hemoglobin A1C; Future    High risk medication use  -     Hemoglobin A1C; Future            Discussed with Dr. Evgeny Macias  Will contact for abnormal labs and will No follow-ups on file.     Signed:    "

## 2021-01-05 ENCOUNTER — PATIENT OUTREACH (OUTPATIENT)
Dept: ADMINISTRATIVE | Facility: OTHER | Age: 51
End: 2021-01-05

## 2021-01-05 DIAGNOSIS — Z12.11 ENCOUNTER FOR FIT (FECAL IMMUNOCHEMICAL TEST) SCREENING: Primary | ICD-10-CM

## 2021-01-07 ENCOUNTER — OFFICE VISIT (OUTPATIENT)
Dept: NEUROLOGY | Facility: CLINIC | Age: 51
End: 2021-01-07
Payer: MEDICARE

## 2021-01-07 VITALS
DIASTOLIC BLOOD PRESSURE: 77 MMHG | BODY MASS INDEX: 19.46 KG/M2 | WEIGHT: 120.56 LBS | SYSTOLIC BLOOD PRESSURE: 110 MMHG | HEART RATE: 74 BPM

## 2021-01-07 DIAGNOSIS — G40.909 SEIZURE DISORDER: ICD-10-CM

## 2021-01-07 DIAGNOSIS — F73 PROFOUND INTELLECTUAL DISABILITY: ICD-10-CM

## 2021-01-07 DIAGNOSIS — G25.71 AKATHISIA: ICD-10-CM

## 2021-01-07 PROCEDURE — 99999 PR PBB SHADOW E&M-EST. PATIENT-LVL V: CPT | Mod: PBBFAC,,, | Performed by: PSYCHIATRY & NEUROLOGY

## 2021-01-07 PROCEDURE — 99204 OFFICE O/P NEW MOD 45 MIN: CPT | Mod: S$PBB,,, | Performed by: PSYCHIATRY & NEUROLOGY

## 2021-01-07 PROCEDURE — 99204 PR OFFICE/OUTPT VISIT, NEW, LEVL IV, 45-59 MIN: ICD-10-PCS | Mod: S$PBB,,, | Performed by: PSYCHIATRY & NEUROLOGY

## 2021-01-07 PROCEDURE — 99999 PR PBB SHADOW E&M-EST. PATIENT-LVL V: ICD-10-PCS | Mod: PBBFAC,,, | Performed by: PSYCHIATRY & NEUROLOGY

## 2021-01-07 PROCEDURE — 99215 OFFICE O/P EST HI 40 MIN: CPT | Mod: PBBFAC,PO | Performed by: PSYCHIATRY & NEUROLOGY

## 2021-01-13 RX ORDER — PANTOPRAZOLE SODIUM 40 MG/1
TABLET, DELAYED RELEASE ORAL
Qty: 90 TABLET | Refills: 3 | Status: SHIPPED | OUTPATIENT
Start: 2021-01-13 | End: 2021-12-27

## 2021-01-13 RX ORDER — LEVOTHYROXINE SODIUM 50 UG/1
TABLET ORAL
Qty: 90 TABLET | Refills: 3 | Status: SHIPPED | OUTPATIENT
Start: 2021-01-13 | End: 2021-12-27

## 2021-07-20 ENCOUNTER — OFFICE VISIT (OUTPATIENT)
Dept: INTERNAL MEDICINE | Facility: CLINIC | Age: 51
End: 2021-07-20
Payer: MEDICARE

## 2021-07-20 VITALS
BODY MASS INDEX: 20.13 KG/M2 | SYSTOLIC BLOOD PRESSURE: 116 MMHG | WEIGHT: 125.25 LBS | OXYGEN SATURATION: 95 % | DIASTOLIC BLOOD PRESSURE: 62 MMHG | HEART RATE: 89 BPM | HEIGHT: 66 IN

## 2021-07-20 DIAGNOSIS — E02 SUBCLINICAL IODINE-DEFICIENCY HYPOTHYROIDISM: ICD-10-CM

## 2021-07-20 DIAGNOSIS — E55.9 VITAMIN D DEFICIENCY DISEASE: ICD-10-CM

## 2021-07-20 DIAGNOSIS — F63.9 IMPULSE CONTROL DISORDER: Primary | ICD-10-CM

## 2021-07-20 DIAGNOSIS — G40.909 SEIZURE DISORDER: ICD-10-CM

## 2021-07-20 DIAGNOSIS — E78.49 OTHER HYPERLIPIDEMIA: ICD-10-CM

## 2021-07-20 DIAGNOSIS — F73 PROFOUND INTELLECTUAL DISABILITY: ICD-10-CM

## 2021-07-20 PROCEDURE — 99213 OFFICE O/P EST LOW 20 MIN: CPT | Mod: PBBFAC | Performed by: INTERNAL MEDICINE

## 2021-07-20 PROCEDURE — 99999 PR PBB SHADOW E&M-EST. PATIENT-LVL III: ICD-10-PCS | Mod: PBBFAC,,, | Performed by: INTERNAL MEDICINE

## 2021-07-20 PROCEDURE — 99214 PR OFFICE/OUTPT VISIT, EST, LEVL IV, 30-39 MIN: ICD-10-PCS | Mod: S$PBB,,, | Performed by: INTERNAL MEDICINE

## 2021-07-20 PROCEDURE — 99214 OFFICE O/P EST MOD 30 MIN: CPT | Mod: S$PBB,,, | Performed by: INTERNAL MEDICINE

## 2021-07-20 PROCEDURE — 99999 PR PBB SHADOW E&M-EST. PATIENT-LVL III: CPT | Mod: PBBFAC,,, | Performed by: INTERNAL MEDICINE

## 2021-07-20 RX ORDER — SENNOSIDES 8.6 MG/1
2 TABLET ORAL DAILY
COMMUNITY
End: 2023-09-24

## 2021-07-20 RX ORDER — TRIAMCINOLONE ACETONIDE 1 MG/G
CREAM TOPICAL 2 TIMES DAILY
Qty: 90 G | Refills: 3 | Status: SHIPPED | OUTPATIENT
Start: 2021-07-20

## 2021-07-20 RX ORDER — KETOCONAZOLE 20 MG/ML
SHAMPOO, SUSPENSION TOPICAL
Qty: 240 ML | Refills: 4 | Status: SHIPPED | OUTPATIENT
Start: 2021-07-20

## 2021-09-30 ENCOUNTER — DOCUMENTATION ONLY (OUTPATIENT)
Dept: PSYCHIATRY | Facility: CLINIC | Age: 51
End: 2021-09-30

## 2021-11-12 ENCOUNTER — TELEPHONE (OUTPATIENT)
Dept: INTERNAL MEDICINE | Facility: CLINIC | Age: 51
End: 2021-11-12
Payer: MEDICARE

## 2021-12-01 ENCOUNTER — TELEPHONE (OUTPATIENT)
Dept: INTERNAL MEDICINE | Facility: CLINIC | Age: 51
End: 2021-12-01
Payer: MEDICARE

## 2022-01-31 ENCOUNTER — PATIENT MESSAGE (OUTPATIENT)
Dept: PSYCHIATRY | Facility: CLINIC | Age: 52
End: 2022-01-31
Payer: MEDICARE

## 2022-02-04 ENCOUNTER — OFFICE VISIT (OUTPATIENT)
Dept: PSYCHIATRY | Facility: CLINIC | Age: 52
End: 2022-02-04
Payer: MEDICARE

## 2022-02-04 DIAGNOSIS — G40.909 SEIZURE DISORDER: ICD-10-CM

## 2022-02-04 DIAGNOSIS — F63.9 IMPULSE CONTROL DISORDER: ICD-10-CM

## 2022-02-04 DIAGNOSIS — F29 PSYCHOSIS, UNSPECIFIED PSYCHOSIS TYPE: ICD-10-CM

## 2022-02-04 DIAGNOSIS — R45.1 AGITATION: ICD-10-CM

## 2022-02-04 DIAGNOSIS — F73 PROFOUND INTELLECTUAL DISABILITY: Primary | ICD-10-CM

## 2022-02-04 PROCEDURE — 99214 PR OFFICE/OUTPT VISIT, EST, LEVL IV, 30-39 MIN: ICD-10-PCS | Mod: 95,,, | Performed by: PSYCHIATRY & NEUROLOGY

## 2022-02-04 PROCEDURE — 99214 OFFICE O/P EST MOD 30 MIN: CPT | Mod: 95,,, | Performed by: PSYCHIATRY & NEUROLOGY

## 2022-02-04 RX ORDER — PALIPERIDONE 3 MG/1
TABLET, EXTENDED RELEASE ORAL
Qty: 30 TABLET | Refills: 6 | Status: SHIPPED | OUTPATIENT
Start: 2022-02-04 | End: 2022-05-03 | Stop reason: SDUPTHER

## 2022-02-04 RX ORDER — OLANZAPINE 10 MG/1
1 TABLET, ORALLY DISINTEGRATING ORAL DAILY PRN
COMMUNITY
End: 2022-02-04 | Stop reason: SDUPTHER

## 2022-02-04 RX ORDER — OLANZAPINE 10 MG/1
10 TABLET, ORALLY DISINTEGRATING ORAL DAILY PRN
Qty: 30 TABLET | Refills: 6 | Status: SHIPPED | OUTPATIENT
Start: 2022-02-04 | End: 2022-05-03

## 2022-02-04 NOTE — PROGRESS NOTES
"Outpatient Psychiatry Follow-Up Visit (MD/NP) -- telemedicine visit  02/04/2022    The patient location is: home ([place], [place])  The chief complaint leading to consultation is: follow up     Visit type: audiovisual    Face to Face time with patient: 35 minutes  45 minutes of total time spent on the encounter, which includes face to face time and non-face to face time preparing to see the patient (eg, review of tests), Obtaining and/or reviewing separately obtained history, Documenting clinical information in the electronic or other health record, Independently interpreting results (not separately reported) and communicating results to the patient/family/caregiver, or Care coordination (not separately reported).     Each patient to whom he or she provides medical services by telemedicine is:  (1) informed of the relationship between the physician and patient and the respective role of any other health care provider with respect to management of the patient; and (2) notified that he or she may decline to receive medical services by telemedicine and may withdraw from such care at any time.      Clinical Status of Patient: Outpatient (Ambulatory)     Session Length:  30 minutes (E&M)      Chief Complaint: Bernard Olvera is a 51 y.o. male who presents today for follow-up of impulsivity, irritibility, mental retardation.   Met with patient and 1 caregiver -- (Emily Diaz has been caregiving for Bernard since 2019.      Interval History and Content of Current Session:   General impression:  First appointment with me since 10/23/2020.  History of interim events is obtained from RP, as pt. cannot communicate verbally -- see below.      Target symptoms: Impulsivity, irritibility, voracious appetite, mental retardation.     Interim events:    Med plan at last appt:  "Continue all current medications with refills as noted."      Resident of  -- he lives in an apt with 24 hour caregivers.  He goes to a " day center in the AM.    His caregiver who is present gives collateral Hx, as pt is near mute and is unable to provide any meaningful information.      He has been more active lately, pinching his skin more, he paces, taking others things, especially food, to get attention from others.     He has a hard time staying seated.  He will sit for a couple of minutes, then will get up and pace around more.    He has not been sleeping well at night.    No physical altercations.  He will only throw objects on the floor.  He often laughs after acting out in ways he knows he should not.      The Invega has not been refilled since Dec 2021.  He has been more anxious and hyperactive, not sleeping.    They also had ran out of PRN olanzapine as well.       ADL's: He can feed self and toilet, brushes his teeth; he needs some assistance with donning clothes correctly.    He likes to watch certain shows on TV, mainly cartoons.  He has toys, stuffed animals.    He will occasionally hit his hand vs his head, or vs the wall or door, likely out of boredom more than anything else.    No verbal or physical violence towards anyone recently.    She states he still picks at skin to the point of drawing blood.    He has been responding fairly well to redirection.      He has been sleeping well at night.    He had some problems with vomiting at the center (not lately); caregiver states he will eat too fast at times, which likely causes gagging and regurgitation of food.  He has to be prompted to eat more slowly, to chew his food.      She denies any recent rumination of food.  No difficulty swallowing chewed food.    Per EMR, he has recently lost wt (see below).  I rechecked his wt on a bathroom scale in session = 117#.    Caregiver states pt has been eating very well -- this does not correspond with his wt loss.    Noted pt had CXR last month that showed an unexplained opacity in LLL.  I recommended pt see Dr. Patterson for f/u soon, as this  finding may need more investigation.      He has been compliant taking his meds as below.          He is at the [address] ([facility]) -- he is NOT with [facility] as before.      They give him all of his night meds around 8:30 PM.      He does not speak -- he only makes grunting sounds.      Review of Systems   · PSYCHIATRIC: Pertinant items are noted in the narrative.  · CONSTITUTIONAL: Recent wt loss.  · MUSCULOSKELETAL: No pain or stiffness of the joints.  · NEUROLOGIC: No weakness, sensory changes, seizures, tremor or other abnormal movements.  · ENDOCRINE: No polydipsia or polyuria.  · INTEGUMENTARY: no rash, no lacerations.  · EYES: No exophthalmos, jaundice or blindness.  · ENT: No dizziness, tinnitus or hearing loss.  · RESPIRATORY: No shortness of breath.  · CARDIOVASCULAR: No tachycardia or chest pain.  · GASTROINTESTINAL: No nausea, vomiting, pain, constipation or diarrhea.  · GENITOURINARY: No frequency, dysuria.                 Current Outpatient Medications:     docusate sodium (COLACE) 100 MG capsule, Take 100 mg by mouth 2 (two) times daily., Disp: , Rfl:     ketoconazole (NIZORAL) 2 % shampoo, USE TO WASH HAIR TWICE A WEEK AS DIRECTED, Disp: 240 mL, Rfl: 4    lamoTRIgine (LAMICTAL) 100 MG tablet, TAKE 1 TAB BY MOUTH TWICE AT 8AM AND 8PM, Disp: 60 tablet, Rfl: 6    levothyroxine (SYNTHROID) 50 MCG tablet, TAKE 1 TAB BY MOUTH BEFORE BREAKFAST DAILY AT 8AM, Disp: 30 tablet, Rfl: 11    multivitamin capsule, Take 1 capsule by mouth once daily., Disp: 30 capsule, Rfl: 11    omega-3 fatty acids-vitamin E 1,000 mg Cap, One tablet karla zack, Disp: 60 each, Rfl: 11    paliperidone (INVEGA) 3 MG TR24, TAKE 1 TAB BY MOUTH EVERY DAY, Disp: 30 tablet, Rfl: 6    pantoprazole (PROTONIX) 40 MG tablet, TAKE 1 TAB BY MOUTH ONCE DAILY AT 8AM, Disp: 30 tablet, Rfl: 11    polyethylene glycol (GLYCOLAX) 17 gram/dose powder, Take 17 g by mouth once daily. (Patient taking differently: Take 17 g by mouth  daily as needed. ), Disp: 595 g, Rfl: 3    propranoloL (INDERAL LA) 60 MG 24 hr capsule, TAKE 1 CAP BY MOUTH ONCE DAILY AT 8PM, Disp: 90 capsule, Rfl: 3    QUEtiapine (SEROQUEL) 100 MG Tab, Take oral one tablet BID at 7 am and 12 noon, Disp: 30 tablet, Rfl: 6    QUEtiapine (SEROQUEL) 25 MG Tab, Take 2 tablets (50 mg total) by mouth every 6 (six) hours as needed (agitation)., Disp: 120 tablet, Rfl: 6    QUEtiapine (SEROQUEL) 50 MG tablet, Take 1 tablet (50 mg total) by mouth nightly., Disp: 30 tablet, Rfl: 6    senna (SENOKOT) 8.6 mg tablet, Take 2 tablets by mouth once daily., Disp: , Rfl:     triamcinolone acetonide 0.1% (KENALOG) 0.1 % cream, Apply topically 2 (two) times daily., Disp: 90 g, Rfl: 3    VITAMIN D3 1,000 unit tablet, TAKE 1 TABLET BY MOUTH EVERY DAY, Disp: 30 tablet, Rfl: 2  Pt has not had any Invega since Dec 2021.    See above -- pt is still receiving Seroquel 50 mg one tablet QID scheduled (7 am/noon/7 pm/qhs).        Compliance: Yes     Side effects: None     Risk Parameters:   Patient reports no suicidal ideation   Patient reports no homicidal ideation   Patient reports no self-injurious behavior   Patient reports no violent behavior     Patient's Response to Intervention:   The patient's response to intervention is accepting.     Progress Toward Goals and Other Mental Status Changes:   The patient's progress toward goals is limited.     Vitals: Most recent vital signs, dated today just prior to this appointment, were reviewed:   VITALS NOT DONE TODAY -- VIRTUAL VISIT ONLY    Vitals - 1 value per visit 1/7/2021 7/20/2021 7/20/2021   SYSTOLIC 110  116   DIASTOLIC 77  62   Pulse 74  89   Temp      Resp      SPO2   95   Weight (lb) 120.59  125.22   Weight (kg) 54.7  56.8   Height   66   BMI (Calculated)   20.2   VISIT REPORT      Pain Score   0        Vitals - 1 value per visit 2/15/2020 9/3/2020 9/4/2020 10/23/2020   SYSTOLIC 132  110 129   DIASTOLIC 69  80 80   PULSE 90 84 86 80  "  TEMPERATURE 97.8 98 97.7    RESPIRATIONS 20 16     SPO2 98 99 98    Weight (lb)  132* 117.06* 115.3   Weight (kg)  59.875 53.1 52.3   HEIGHT   5' 6"    BODY MASS INDEX  21.31 18.89 18.61   VISIT REPORT       Pain Score    0    *doubtful his wt dropped by 15# in one day    Vitals - 1 value per visit 11/9/2019 11/11/2019 11/12/2019 2/6/2020   SYSTOLIC 126 120 110 120   DIASTOLIC 80 86 72 56   PULSE 99 97 98 78   TEMPERATURE 98.3 98.6 97.6    RESPIRATIONS 18  18    SPO2 100 98 98    Weight (lb)  134.37 140 132.61   Weight (kg)  60.95 63.504 60.15   HEIGHT  5' 10" 5' 6" 5' 6"   BODY MASS INDEX  19.28 22.6 21.4   VISIT REPORT       Pain Score   0         Vitals - 1 value per visit 3/20/2019 4/5/2019 4/16/2019 5/10/2019   SYSTOLIC 120 133 100 123   DIASTOLIC 75 78 68 80   PULSE 83 86 89 84   TEMPERATURE       RESPIRATIONS       SPO2       Weight (lb) 126.1 132.06 133.16 142.64   Weight (kg) 57.2 59.9 60.4 64.7   HEIGHT  6' 0" 5' 10"    BODY MASS INDEX 17.1 17.91 19.11 20.47   VISIT REPORT       Pain Score    0        Labs:    No visits with results within 7 Month(s) from this visit.   Latest known visit with results is:   Lab Visit on 11/19/2020   Component Date Value Ref Range Status    Cholesterol 11/19/2020 216* 120 - 199 mg/dL Final    Triglycerides 11/19/2020 121  30 - 150 mg/dL Final    HDL 11/19/2020 46  40 - 75 mg/dL Final    LDL Cholesterol 11/19/2020 145.8  63.0 - 159.0 mg/dL Final    HDL/Cholesterol Ratio 11/19/2020 21.3  20.0 - 50.0 % Final    Total Cholesterol/HDL Ratio 11/19/2020 4.7  2.0 - 5.0 Final    Non-HDL Cholesterol 11/19/2020 170  mg/dL Final    TSH 11/19/2020 1.470  0.400 - 4.000 uIU/mL Final    Hemoglobin A1C 11/19/2020 4.7  4.0 - 5.6 % Final    Estimated Avg Glucose 11/19/2020 88  68 - 131 mg/dL Final    Valproic Acid Level 11/19/2020 <12.5* 50.0 - 100.0 ug/mL Final         Mental Status Evaluation      Appearance:  casually dressed, disheveled   Behavior:  Not fully cooperative, " impulsive, but mostly calm and redirectable, eye contact minimal   Speech:  no intelligible speech; sometimes makes noises: grunts, moans    Mood:  Unable to assess; patient cannot verbally communicate    Affect:  Euthymic to mildly irritible at times, blunted, constricted   Thought Process:  Profound poverty of thought    Thought Content:  normal, no suicidality, no homicidality, delusions, or paranoia    Sensorium:  Unable to assess; patient cannot verbally communicate   Attention Span & Concentration  Poor    Cognition:  severely impaired    Insight:  very poor    Judgment:  very poor      AIMS Screening:  Not due currently (also, VIRTUAL SESSION ONLY).  Pt did not sit or stand still long enough to be able to visualize any abnormal movements.      Impression:   Intellectual Disability, profound  Impulse-Control Disorder, unspecified  Unspecified Psychosis -- only for coding purposes for insurance coverage of pt's neuroleptic meds   Akathisia, improving (NOT CODED)   Seizure Disorder   Hx of Fatty liver with elevated liver enzymes (NOT CODED)    Plan:  Medication management:  Restart Invega 3 mg daily.    Try Zyprexa (Zydis) 10 mg daily prn non-redirectable agitation.  Can use in place of the 25 mg Seroquel tablets that had been ordered q 6 hrs prn.     Continue all other current medications with refills as noted.           Additional Notes:  Follow up with Dr. Elaina Patterson for medical issues.     Return to Clinic: 3 months, or sooner prn.    AIMS DONE TODAY.  NEXT AIMS DUE 04/'21 (WILL DO ON RTC).

## 2022-02-23 ENCOUNTER — PES CALL (OUTPATIENT)
Dept: ADMINISTRATIVE | Facility: CLINIC | Age: 52
End: 2022-02-23
Payer: MEDICARE

## 2022-04-11 DIAGNOSIS — R25.1 TREMOR: ICD-10-CM

## 2022-04-11 NOTE — TELEPHONE ENCOUNTER
No new care gaps identified.  Powered by [facility] [employer]. Reference number: [id number].   4/11/2022 2:55:41 PM CDT

## 2022-04-12 RX ORDER — PROPRANOLOL HYDROCHLORIDE 60 MG/1
CAPSULE, EXTENDED RELEASE ORAL
Qty: 90 CAPSULE | Refills: 1 | Status: SHIPPED | OUTPATIENT
Start: 2022-04-12 | End: 2022-10-03

## 2022-04-12 NOTE — TELEPHONE ENCOUNTER
Refill Authorization Note   Bernard Olvera  is requesting a refill authorization.  Brief Assessment and Rationale for Refill:  Approve     Medication Therapy Plan:       Medication Reconciliation Completed: No   Comments:     No Care Gaps recommended.     Note composed:2:16 PM 04/12/2022

## 2022-05-03 ENCOUNTER — OFFICE VISIT (OUTPATIENT)
Dept: PSYCHIATRY | Facility: CLINIC | Age: 52
End: 2022-05-03
Payer: MEDICARE

## 2022-05-03 VITALS — BODY MASS INDEX: 21.05 KG/M2 | WEIGHT: 131 LBS | HEIGHT: 66 IN

## 2022-05-03 DIAGNOSIS — F29 PSYCHOSIS, UNSPECIFIED PSYCHOSIS TYPE: ICD-10-CM

## 2022-05-03 DIAGNOSIS — R45.1 AGITATION: ICD-10-CM

## 2022-05-03 DIAGNOSIS — G40.909 SEIZURE DISORDER: ICD-10-CM

## 2022-05-03 DIAGNOSIS — F42.4 COMPULSIVE SKIN PICKING: ICD-10-CM

## 2022-05-03 DIAGNOSIS — F63.9 IMPULSE CONTROL DISORDER: ICD-10-CM

## 2022-05-03 DIAGNOSIS — F73 PROFOUND INTELLECTUAL DISABILITY: Primary | ICD-10-CM

## 2022-05-03 PROCEDURE — 99213 OFFICE O/P EST LOW 20 MIN: CPT | Mod: S$PBB,,, | Performed by: PSYCHIATRY & NEUROLOGY

## 2022-05-03 PROCEDURE — 99999 PR PBB SHADOW E&M-EST. PATIENT-LVL II: ICD-10-PCS | Mod: PBBFAC,,, | Performed by: PSYCHIATRY & NEUROLOGY

## 2022-05-03 PROCEDURE — 99999 PR PBB SHADOW E&M-EST. PATIENT-LVL II: CPT | Mod: PBBFAC,,, | Performed by: PSYCHIATRY & NEUROLOGY

## 2022-05-03 PROCEDURE — 99212 OFFICE O/P EST SF 10 MIN: CPT | Mod: PBBFAC | Performed by: PSYCHIATRY & NEUROLOGY

## 2022-05-03 PROCEDURE — 99213 PR OFFICE/OUTPT VISIT, EST, LEVL III, 20-29 MIN: ICD-10-PCS | Mod: S$PBB,,, | Performed by: PSYCHIATRY & NEUROLOGY

## 2022-05-03 RX ORDER — QUETIAPINE FUMARATE 25 MG/1
50 TABLET, FILM COATED ORAL EVERY 6 HOURS PRN
Qty: 120 TABLET | Refills: 6 | Status: SHIPPED | OUTPATIENT
Start: 2022-05-03 | End: 2022-12-21 | Stop reason: DRUGHIGH

## 2022-05-03 RX ORDER — CITALOPRAM 20 MG/1
20 TABLET, FILM COATED ORAL DAILY
Qty: 30 TABLET | Refills: 6 | Status: SHIPPED | OUTPATIENT
Start: 2022-05-03 | End: 2022-12-21 | Stop reason: SDUPTHER

## 2022-05-03 RX ORDER — LAMOTRIGINE 100 MG/1
TABLET ORAL
Qty: 60 TABLET | Refills: 6 | Status: SHIPPED | OUTPATIENT
Start: 2022-05-03 | End: 2022-12-21 | Stop reason: SDUPTHER

## 2022-05-03 RX ORDER — PALIPERIDONE 3 MG/1
TABLET, EXTENDED RELEASE ORAL
Qty: 30 TABLET | Refills: 6 | Status: SHIPPED | OUTPATIENT
Start: 2022-05-03 | End: 2022-12-21 | Stop reason: SDUPTHER

## 2022-05-03 RX ORDER — QUETIAPINE FUMARATE 100 MG/1
TABLET, FILM COATED ORAL
Qty: 60 TABLET | Refills: 6 | Status: SHIPPED | OUTPATIENT
Start: 2022-05-03 | End: 2022-12-21

## 2022-05-03 RX ORDER — QUETIAPINE FUMARATE 50 MG/1
50 TABLET, FILM COATED ORAL NIGHTLY
Qty: 30 TABLET | Refills: 6 | Status: SHIPPED | OUTPATIENT
Start: 2022-05-03 | End: 2022-12-21 | Stop reason: DRUGHIGH

## 2022-05-03 NOTE — PROGRESS NOTES
"Outpatient Psychiatry Follow-Up Visit (MD/NP)   05/03/2022    Clinical Status of Patient: Outpatient (Ambulatory)     Session Length:  30 minutes (E&M)      Chief Complaint: Bernard Olvera is a 51 y.o. male who presents today for follow-up of impulsivity, irritibility, mental retardation.   Met with patient and 1 caregiver -- (Daniela).        Interval History and Content of Current Session:   General impression:  First appointment with me since 2/4/2022.  History of interim events is obtained from RP, as pt. cannot communicate verbally -- see below.      Target symptoms: Impulsivity, irritibility, voracious appetite, mental retardation.     Interim events:    Med plan at last appt:  "Restart Invega 3 mg daily.    Try Zyprexa (Zydis) 10 mg daily prn non-redirectable agitation.  Can use in place of the 25 mg Seroquel tablets that had been ordered q 6 hrs prn.     Continue all other current medications with refills as noted."      Resident of  -- he lives in an apt with 24 hour caregivers.    He lives by himself.  He goes to a day center in the AM.    His caregiver who is present gives collateral Hx, as pt is near mute and is unable to provide any meaningful information.      He continues to pick at his skin on his arms.    He also will often hit his head vs a hard object, such as a table.    The girl who stays with him at night states he has not been sleeping much.    Daniela states pt has his nights and days mixed up.    He is redirectable for most things.    He responds well to his caregiver's directions in session.    He will try to snack on sweets and junk food.      He likes going out to the park or to eat.      No recent physical altercations.  He will only throw objects on the floor.  He often laughs after acting out in ways he knows he should not.      He tends to do better with other people not around him.      ADL's: He can feed self and toilet, brushes his teeth; he needs some assistance with " donning clothes correctly.    He likes to watch certain shows on TV, mainly cartoons.  He has toys, stuffed animals.    He will occasionally hit his hand vs his head, or vs the wall or door, likely out of boredom more than anything else.    No verbal or physical violence towards anyone recently.    She states he still picks at skin to the point of drawing blood.    He has been responding fairly well to redirection.      He has been sleeping well at night.    He had some problems with vomiting at the center (not lately); caregiver states he will eat too fast at times, which likely causes gagging and regurgitation of food.  He has to be prompted to eat more slowly, to chew his food.      She denies any recent rumination of food.  No difficulty swallowing chewed food.    Per EMR, he has recently gained wt (see below).  I rechecked his wt on a bathroom scale in session = 131#.    Caregiver states pt has been eating very well -- this does not correspond with his wt loss.    Noted pt had CXR last month that showed an unexplained opacity in LLL.  I recommended pt see Dr. Patterson for f/u soon, as this finding may need more investigation.      He has been compliant taking his meds as below.          He is at the [address] (on [facility]) -- he is NOT with [facility] as before.      They give him all of his night meds around 8:30 PM.      He does not speak -- he only makes grunting sounds.      Review of Systems   PSYCHIATRIC: Pertinant items are noted in the narrative.  CONSTITUTIONAL: Recent wt loss.  MUSCULOSKELETAL: No pain or stiffness of the joints.  NEUROLOGIC: No weakness, sensory changes, seizures, tremor or other abnormal movements.  ENDOCRINE: No polydipsia or polyuria.  INTEGUMENTARY: no rash, no lacerations.  EYES: No exophthalmos, jaundice or blindness.  ENT: No dizziness, tinnitus or hearing loss.  RESPIRATORY: No shortness of breath.  CARDIOVASCULAR: No tachycardia or chest pain.  GASTROINTESTINAL: No  nausea, vomiting, pain, constipation or diarrhea.  GENITOURINARY: No frequency, dysuria.                 Current Outpatient Medications:     docusate sodium (COLACE) 100 MG capsule, Take 100 mg by mouth 2 (two) times daily., Disp: , Rfl:     ketoconazole (NIZORAL) 2 % shampoo, USE TO WASH HAIR TWICE A WEEK AS DIRECTED, Disp: 240 mL, Rfl: 4    lamoTRIgine (LAMICTAL) 100 MG tablet, TAKE 1 TAB BY MOUTH TWICE AT 8AM AND 8PM, Disp: 60 tablet, Rfl: 6    levothyroxine (SYNTHROID) 50 MCG tablet, TAKE 1 TAB BY MOUTH BEFORE BREAKFAST DAILY AT 8AM, Disp: 30 tablet, Rfl: 11    multivitamin capsule, Take 1 capsule by mouth once daily., Disp: 30 capsule, Rfl: 11    olanzapine zydis (ZYPREXA) 10 MG TbDL, Take 1 tablet (10 mg total) by mouth daily as needed (non-redirecable agitation)., Disp: 30 tablet, Rfl: 6    omega-3 fatty acids-vitamin E 1,000 mg Cap, One tablet maday dixon, Disp: 60 each, Rfl: 11    paliperidone (INVEGA) 3 MG TR24, TAKE 1 TAB BY MOUTH EVERY DAY, Disp: 30 tablet, Rfl: 6    pantoprazole (PROTONIX) 40 MG tablet, TAKE 1 TAB BY MOUTH ONCE DAILY AT 8AM, Disp: 30 tablet, Rfl: 11    polyethylene glycol (GLYCOLAX) 17 gram/dose powder, Take 17 g by mouth once daily. (Patient taking differently: Take 17 g by mouth daily as needed. ), Disp: 595 g, Rfl: 3    propranoloL (INDERAL LA) 60 MG 24 hr capsule, TAKE 1 CAP BY MOUTH ONCE DAILY AT 8PM, Disp: 90 capsule, Rfl: 1    QUEtiapine (SEROQUEL) 100 MG Tab, Take oral one tablet BID at 7 am and 12 noon, Disp: 30 tablet, Rfl: 6    QUEtiapine (SEROQUEL) 25 MG Tab, Take 2 tablets (50 mg total) by mouth every 6 (six) hours as needed (agitation)., Disp: 120 tablet, Rfl: 6    QUEtiapine (SEROQUEL) 50 MG tablet, Take 1 tablet (50 mg total) by mouth nightly., Disp: 30 tablet, Rfl: 6    senna (SENOKOT) 8.6 mg tablet, Take 2 tablets by mouth once daily., Disp: , Rfl:     triamcinolone acetonide 0.1% (KENALOG) 0.1 % cream, Apply topically 2 (two) times daily., Disp: 90 g, Rfl: 3     "VITAMIN D3 1,000 unit tablet, TAKE 1 TABLET BY MOUTH EVERY DAY, Disp: 30 tablet, Rfl: 2  Pt has been taking Seroquel 100 mg one tablet twice daily and 50 mg qhs daily as noted above.        Compliance: Yes     Side effects: None     Risk Parameters:   Patient reports no suicidal ideation   Patient reports no homicidal ideation   Patient reports no self-injurious behavior   Patient reports no violent behavior     Patient's Response to Intervention:   The patient's response to intervention is accepting.     Progress Toward Goals and Other Mental Status Changes:   The patient's progress toward goals is limited.     Vitals: Most recent vital signs, dated today just prior to this appointment, were reviewed:     Vitals:    05/03/22 1704   Weight: 59.4 kg (131 lb)   Height: 5' 6" (1.676 m)       Vitals - 1 value per visit 1/7/2021 7/20/2021 7/20/2021   SYSTOLIC 110  116   DIASTOLIC 77  62   Pulse 74  89   Temp      Resp      SPO2   95   Weight (lb) 120.59  125.22   Weight (kg) 54.7  56.8   Height   66   BMI (Calculated)   20.2   VISIT REPORT      Pain Score   0        Vitals - 1 value per visit 2/15/2020 9/3/2020 9/4/2020 10/23/2020   SYSTOLIC 132  110 129   DIASTOLIC 69  80 80   PULSE 90 84 86 80   TEMPERATURE 97.8 98 97.7    RESPIRATIONS 20 16     SPO2 98 99 98    Weight (lb)  132* 117.06* 115.3   Weight (kg)  59.875 53.1 52.3   HEIGHT   5' 6"    BODY MASS INDEX  21.31 18.89 18.61   VISIT REPORT       Pain Score    0    *doubtful his wt dropped by 15# in one day    Vitals - 1 value per visit 11/9/2019 11/11/2019 11/12/2019 2/6/2020   SYSTOLIC 126 120 110 120   DIASTOLIC 80 86 72 56   PULSE 99 97 98 78   TEMPERATURE 98.3 98.6 97.6    RESPIRATIONS 18  18    SPO2 100 98 98    Weight (lb)  134.37 140 132.61   Weight (kg)  60.95 63.504 60.15   HEIGHT  5' 10" 5' 6" 5' 6"   BODY MASS INDEX  19.28 22.6 21.4   VISIT REPORT       Pain Score   0       Labs:    No visits with results within 7 Month(s) from this visit.   Latest known " visit with results is:   Lab Visit on 11/19/2020   Component Date Value Ref Range Status    Cholesterol 11/19/2020 216 (A) 120 - 199 mg/dL Final    Triglycerides 11/19/2020 121  30 - 150 mg/dL Final    HDL 11/19/2020 46  40 - 75 mg/dL Final    LDL Cholesterol 11/19/2020 145.8  63.0 - 159.0 mg/dL Final    HDL/Cholesterol Ratio 11/19/2020 21.3  20.0 - 50.0 % Final    Total Cholesterol/HDL Ratio 11/19/2020 4.7  2.0 - 5.0 Final    Non-HDL Cholesterol 11/19/2020 170  mg/dL Final    TSH 11/19/2020 1.470  0.400 - 4.000 uIU/mL Final    Hemoglobin A1C 11/19/2020 4.7  4.0 - 5.6 % Final    Estimated Avg Glucose 11/19/2020 88  68 - 131 mg/dL Final    Valproic Acid Level 11/19/2020 <12.5 (A) 50.0 - 100.0 ug/mL Final         Mental Status Evaluation      Appearance:  casually dressed, disheveled   Behavior:  Not fully cooperative, impulsive, but mostly calm and redirectable, eye contact minimal   Speech:  no intelligible speech; sometimes makes noises: grunts, moans    Mood:  Unable to assess; patient cannot verbally communicate    Affect:  Euthymic to mildly irritible at times, blunted, constricted   Thought Process:  Profound poverty of thought    Thought Content:  normal, no suicidality, no homicidality, delusions, or paranoia    Sensorium:  Unable to assess; patient cannot verbally communicate   Attention Span & Concentration  Poor    Cognition:  severely impaired    Insight:  very poor    Judgment:  very poor      AIMS Screening:      Facial and Oral Movements  Muscles of Facial Expression: Mild (blunting)  Lips and Perioral Area:  (Pt not fully cooperative with exam; no abnormalities noted)  Jaw:  (Pt not fully cooperative with exam; no abnormalities noted)  Tongue:  (Pt not fully cooperative with exam; no abnormalities noted)    Extremity Movements  Upper (arms, wrists, hands, fingers): None, normal (no cogwheel rigidity; no tremor observed)  Lower (legs, knees, ankles, toes): None, normal (normal; no shuffling  observed)    Trunk Movements  Neck, shoulders, hips: None, normal    Overall Severity  Severity of abnormal movements (highest score from questions above): 2  Incapacitation due to abnormal movements: None, normal  Patient's awareness of abnormal movements (rate only patient's report): No Awareness    Dental Status  Current problems with teeth and/or dentures?: No  Does patient usually wear dentures?: No      Impression:   Intellectual Disability, profound  Impulse-Control Disorder, unspecified  Unspecified Psychosis -- only for coding purposes for insurance coverage of pt's neuroleptic meds   Akathisia, improving (NOT CODED)   Seizure Disorder   Hx of Fatty liver with elevated liver enzymes (NOT CODED)    Plan:  Medication management:  Try Celexa 20 mg one tablet daily for anxiety and compulsive skin picking.  Caregivers may give this med at night if it causes drowsiness.    Continue Invega 3 mg daily.    Continue Seroquel 100 mg BID and 50 mg qhs.    Continue Seroquel 25 mg one tablet up to q 6 hrs prn agitation.   Continue all other current medications with refills as noted.           Additional Notes:  Follow up with Dr. Elaina Patterson for medical issues.     Return to Clinic: 3 months, or sooner prn.    AIMS DONE TODAY.  NEXT AIMS DUE 04/'21 (WILL DO ON RTC).

## 2022-05-03 NOTE — PATIENT INSTRUCTIONS
Try citalopram 20 mg one tablet once daily.  Give at night if this medication causes drowsiness.    Continue all other current medications as directed -- refills have been sent.      Return to clinic in approximately 3 months for follow up appointment.

## 2022-05-11 ENCOUNTER — PATIENT MESSAGE (OUTPATIENT)
Dept: ADMINISTRATIVE | Facility: HOSPITAL | Age: 52
End: 2022-05-11
Payer: MEDICARE

## 2022-06-22 ENCOUNTER — PES CALL (OUTPATIENT)
Dept: ADMINISTRATIVE | Facility: CLINIC | Age: 52
End: 2022-06-22
Payer: MEDICARE

## 2022-07-01 ENCOUNTER — LAB VISIT (OUTPATIENT)
Dept: LAB | Facility: HOSPITAL | Age: 52
End: 2022-07-01
Attending: INTERNAL MEDICINE
Payer: MEDICARE

## 2022-07-01 ENCOUNTER — OFFICE VISIT (OUTPATIENT)
Dept: INTERNAL MEDICINE | Facility: CLINIC | Age: 52
End: 2022-07-01
Payer: MEDICARE

## 2022-07-01 VITALS
BODY MASS INDEX: 21.57 KG/M2 | DIASTOLIC BLOOD PRESSURE: 60 MMHG | WEIGHT: 134.25 LBS | HEART RATE: 78 BPM | OXYGEN SATURATION: 98 % | SYSTOLIC BLOOD PRESSURE: 100 MMHG | HEIGHT: 66 IN

## 2022-07-01 DIAGNOSIS — E03.9 HYPOTHYROIDISM, UNSPECIFIED TYPE: ICD-10-CM

## 2022-07-01 DIAGNOSIS — G40.909 SEIZURE DISORDER: ICD-10-CM

## 2022-07-01 DIAGNOSIS — F73 PROFOUND INTELLECTUAL DISABILITY: ICD-10-CM

## 2022-07-01 DIAGNOSIS — E78.49 OTHER HYPERLIPIDEMIA: ICD-10-CM

## 2022-07-01 DIAGNOSIS — E53.8 VITAMIN B12 DEFICIENCY: ICD-10-CM

## 2022-07-01 DIAGNOSIS — E55.9 VITAMIN D DEFICIENCY DISEASE: ICD-10-CM

## 2022-07-01 DIAGNOSIS — E55.9 VITAMIN D DEFICIENCY DISEASE: Primary | ICD-10-CM

## 2022-07-01 LAB
25(OH)D3+25(OH)D2 SERPL-MCNC: 56 NG/ML (ref 30–96)
ALBUMIN SERPL BCP-MCNC: 4.2 G/DL (ref 3.5–5.2)
ALP SERPL-CCNC: 80 U/L (ref 55–135)
ALT SERPL W/O P-5'-P-CCNC: 12 U/L (ref 10–44)
ANION GAP SERPL CALC-SCNC: 9 MMOL/L (ref 8–16)
AST SERPL-CCNC: 20 U/L (ref 10–40)
BASOPHILS # BLD AUTO: 0.03 K/UL (ref 0–0.2)
BASOPHILS NFR BLD: 0.5 % (ref 0–1.9)
BILIRUB SERPL-MCNC: 0.7 MG/DL (ref 0.1–1)
BUN SERPL-MCNC: 11 MG/DL (ref 6–20)
CALCIUM SERPL-MCNC: 9.8 MG/DL (ref 8.7–10.5)
CHLORIDE SERPL-SCNC: 101 MMOL/L (ref 95–110)
CHOLEST SERPL-MCNC: 219 MG/DL (ref 120–199)
CHOLEST/HDLC SERPL: 6.1 {RATIO} (ref 2–5)
CO2 SERPL-SCNC: 28 MMOL/L (ref 23–29)
CREAT SERPL-MCNC: 0.8 MG/DL (ref 0.5–1.4)
DIFFERENTIAL METHOD: ABNORMAL
EOSINOPHIL # BLD AUTO: 0.3 K/UL (ref 0–0.5)
EOSINOPHIL NFR BLD: 5.6 % (ref 0–8)
ERYTHROCYTE [DISTWIDTH] IN BLOOD BY AUTOMATED COUNT: 12.1 % (ref 11.5–14.5)
EST. GFR  (AFRICAN AMERICAN): >60 ML/MIN/1.73 M^2
EST. GFR  (NON AFRICAN AMERICAN): >60 ML/MIN/1.73 M^2
GLUCOSE SERPL-MCNC: 91 MG/DL (ref 70–110)
HCT VFR BLD AUTO: 39.1 % (ref 40–54)
HDLC SERPL-MCNC: 36 MG/DL (ref 40–75)
HDLC SERPL: 16.4 % (ref 20–50)
HGB BLD-MCNC: 12.7 G/DL (ref 14–18)
IMM GRANULOCYTES # BLD AUTO: 0.01 K/UL (ref 0–0.04)
IMM GRANULOCYTES NFR BLD AUTO: 0.2 % (ref 0–0.5)
LDLC SERPL CALC-MCNC: 157.4 MG/DL (ref 63–159)
LYMPHOCYTES # BLD AUTO: 1.4 K/UL (ref 1–4.8)
LYMPHOCYTES NFR BLD: 24.4 % (ref 18–48)
MCH RBC QN AUTO: 29.1 PG (ref 27–31)
MCHC RBC AUTO-ENTMCNC: 32.5 G/DL (ref 32–36)
MCV RBC AUTO: 90 FL (ref 82–98)
MONOCYTES # BLD AUTO: 0.4 K/UL (ref 0.3–1)
MONOCYTES NFR BLD: 8 % (ref 4–15)
NEUTROPHILS # BLD AUTO: 3.4 K/UL (ref 1.8–7.7)
NEUTROPHILS NFR BLD: 61.3 % (ref 38–73)
NONHDLC SERPL-MCNC: 183 MG/DL
NRBC BLD-RTO: 0 /100 WBC
PLATELET # BLD AUTO: 238 K/UL (ref 150–450)
PMV BLD AUTO: 10.3 FL (ref 9.2–12.9)
POTASSIUM SERPL-SCNC: 3.8 MMOL/L (ref 3.5–5.1)
PROT SERPL-MCNC: 7.4 G/DL (ref 6–8.4)
RBC # BLD AUTO: 4.36 M/UL (ref 4.6–6.2)
SODIUM SERPL-SCNC: 138 MMOL/L (ref 136–145)
TRIGL SERPL-MCNC: 128 MG/DL (ref 30–150)
TSH SERPL DL<=0.005 MIU/L-ACNC: 2.24 UIU/ML (ref 0.4–4)
VIT B12 SERPL-MCNC: 1371 PG/ML (ref 210–950)
WBC # BLD AUTO: 5.53 K/UL (ref 3.9–12.7)

## 2022-07-01 PROCEDURE — 36415 COLL VENOUS BLD VENIPUNCTURE: CPT | Performed by: INTERNAL MEDICINE

## 2022-07-01 PROCEDURE — 99212 OFFICE O/P EST SF 10 MIN: CPT | Mod: PBBFAC | Performed by: INTERNAL MEDICINE

## 2022-07-01 PROCEDURE — 80061 LIPID PANEL: CPT | Performed by: INTERNAL MEDICINE

## 2022-07-01 PROCEDURE — 99214 PR OFFICE/OUTPT VISIT, EST, LEVL IV, 30-39 MIN: ICD-10-PCS | Mod: S$PBB,,, | Performed by: INTERNAL MEDICINE

## 2022-07-01 PROCEDURE — 80053 COMPREHEN METABOLIC PANEL: CPT | Performed by: INTERNAL MEDICINE

## 2022-07-01 PROCEDURE — 99214 OFFICE O/P EST MOD 30 MIN: CPT | Mod: S$PBB,,, | Performed by: INTERNAL MEDICINE

## 2022-07-01 PROCEDURE — 84443 ASSAY THYROID STIM HORMONE: CPT | Performed by: INTERNAL MEDICINE

## 2022-07-01 PROCEDURE — 82306 VITAMIN D 25 HYDROXY: CPT | Performed by: INTERNAL MEDICINE

## 2022-07-01 PROCEDURE — 82607 VITAMIN B-12: CPT | Performed by: INTERNAL MEDICINE

## 2022-07-01 PROCEDURE — 99999 PR PBB SHADOW E&M-EST. PATIENT-LVL II: ICD-10-PCS | Mod: PBBFAC,,, | Performed by: INTERNAL MEDICINE

## 2022-07-01 PROCEDURE — 85025 COMPLETE CBC W/AUTO DIFF WBC: CPT | Performed by: INTERNAL MEDICINE

## 2022-07-01 PROCEDURE — 99999 PR PBB SHADOW E&M-EST. PATIENT-LVL II: CPT | Mod: PBBFAC,,, | Performed by: INTERNAL MEDICINE

## 2022-07-01 NOTE — PROGRESS NOTES
CHIEF COMPLAINT:  Follow up seizure disorder, hyperlipidemia, behavior disorder.     HISTORY OF PRESENT ILLNESS: 51-year-old man who presents with a caregiver, Daniela Hutson (works with him daily for the last 4 years).  His  is Emily  All history is obtained from Daniela due to his mental handicapped.   He still gets his care through [address]. Bernard is going to the day program from 6 am to 1 pm.  .There are a lot of people in the day progarm 30-40 individuals in one big room.  HE gets aggravated in the day program.  HE is fine in his appartment.  . He is happy.  He continues to pick his arms.  Appetite is good.  He eats well.      He goes to be around 11 pm.  He has to get up around 5 am to go to the day program. HE sleeps all night.  He does sleep in on the weekends.  HE gets up around 10 am.      His sister, Vibha is involved in his life.      No apparent seiizure activity.  He continues to take lamictal 100 mg twice daily for his seizure disorder. He saw Dr Nazario 1/7/21       He is currently taking citalopram 20 mg once daily,  Intuniv 3 mg daily, Invega 3 mg daily and Propranolol LA 60 mg daily, seroquel 100 mg at 7 am, 100 mg at noon and 50 mg at 7 pm .  HE is followed by Dr Vides - last seen 5/3/22         He is  on Synthroid 50 mcg daily for hypothryoidism. No apparent fatigue      He has a scaly rash on the left forearm. He tends to pick at that area.      He is on vitamin D 1000 units daily for vitamin D deficiency      He is taking pantoprazole 40 mg in am for reflux.     PAST MEDICAL HISTORY:   1. Moderate to severe mental handicap.   2. Stereotypical movement disorder.   3. Impulse control disorder.   4. Seizure disorder.   5. Right undescended testis removed at age 1.   6. Hyperlipidemia - off meds currently.   7. Transaminiitis  8. Hypothyroidism    SOCIAL HISTORY: He does not smoke, does not drink. He is in supported independent living through [facility] [employer].     MEDICATIONS  "and ALLERGIES: Updated on epic     Review of systems: no fever, chills, visual change, hearing issues, sinus congestion, sore throat, shortness of breath, chest pain, abdominal pain, nausea, voimting, constipation, diarrhea, heartburn, urinary frequency, nocturia, joint pain or muscle pain, rashes, headaches, excessive thrist    PHYSICAL EXAMINATION:     /60   Pulse 78   Ht 5' 6" (1.676 m)   Wt 60.9 kg (134 lb 4.2 oz)   SpO2 98%   BMI 21.67 kg/m²        General: Alert, oriented. No apparent distress. Affect within normal   limits. Nonverbal.   Conjunctivae anicteric. Tympanic membranes clear. Oropharynx clear.   Neck supple.   Respiratory effort normal. Lungs clear to auscultation.   Heart: Regular rate and rhythm without murmurs, gallops or rubs.   No lower extremity edema.   ABDOMEN: soft, non distended, non tender, bowel sounds present, no hepatosplenomgaly  BREAST: no abn seen, no nodules palpated, no axillary lad  Feet without calluses, erythema or warmth   Right testicle removed. Left testicle no masses.              ASSESSMENT AND PLAN:     1.  Seizure disorder - stable on Lamictal.   2. Moderately mentally handicapped with impulse control disorder -follow up with Dr Vides  3. Hypothyroidism - TSH -e  4. Lichen planus vs psoriasis on arm - stable  5. Vitamin D deficiency - on replacement  . Famiily declines colonoscopy  I'll see him back in 6 months, sooner if problems arise.   Answers for HPI/ROS submitted by the patient on 7/1/2022  activity change: No  unexpected weight change: No  neck pain: No  hearing loss: No  rhinorrhea: No  trouble swallowing: No  eye discharge: No  visual disturbance: No  chest tightness: No  wheezing: No  chest pain: No  palpitations: No  blood in stool: No  constipation: No  vomiting: No  diarrhea: No  polydipsia: No  polyuria: No  difficulty urinating: No  urgency: No  hematuria: No  joint swelling: No  arthralgias: No  headaches: No  weakness: No  confusion: " No  dysphoric mood: No

## 2022-07-14 ENCOUNTER — PATIENT MESSAGE (OUTPATIENT)
Dept: INTERNAL MEDICINE | Facility: CLINIC | Age: 52
End: 2022-07-14
Payer: MEDICARE

## 2022-07-27 ENCOUNTER — PATIENT MESSAGE (OUTPATIENT)
Dept: PSYCHIATRY | Facility: CLINIC | Age: 52
End: 2022-07-27
Payer: MEDICARE

## 2022-08-01 NOTE — ED NOTES
Iv removed, pt discharged per ambulation into care of caretaker who verbalized understanding of discharge instructions provided, no acute distress noted, resp even and unlabored   [facility] Pregnancy And Lactation Text: There is insufficient data to evaluate drug-associated risk for major birth defects, miscarriage, or other adverse maternal or fetal outcomes.  There is a pregnancy registry that monitors pregnancy outcomes in pregnant persons exposed to the medication during pregnancy.  It is unknown if this medication is excreted in breast milk.  Do not breastfeed during treatment and for about 4 weeks after the last dose.

## 2022-08-29 ENCOUNTER — PATIENT MESSAGE (OUTPATIENT)
Dept: INTERNAL MEDICINE | Facility: CLINIC | Age: 52
End: 2022-08-29
Payer: MEDICARE

## 2022-12-21 ENCOUNTER — OFFICE VISIT (OUTPATIENT)
Dept: PSYCHIATRY | Facility: CLINIC | Age: 52
End: 2022-12-21
Payer: MEDICARE

## 2022-12-21 VITALS
BODY MASS INDEX: 21.88 KG/M2 | SYSTOLIC BLOOD PRESSURE: 111 MMHG | HEART RATE: 77 BPM | WEIGHT: 135.56 LBS | DIASTOLIC BLOOD PRESSURE: 64 MMHG

## 2022-12-21 DIAGNOSIS — F42.4 COMPULSIVE SKIN PICKING: ICD-10-CM

## 2022-12-21 DIAGNOSIS — F29 PSYCHOSIS, UNSPECIFIED PSYCHOSIS TYPE: ICD-10-CM

## 2022-12-21 DIAGNOSIS — R45.1 AGITATION: ICD-10-CM

## 2022-12-21 DIAGNOSIS — F63.9 IMPULSE CONTROL DISORDER: ICD-10-CM

## 2022-12-21 DIAGNOSIS — F73 PROFOUND INTELLECTUAL DISABILITY: Primary | ICD-10-CM

## 2022-12-21 DIAGNOSIS — G40.909 SEIZURE DISORDER: ICD-10-CM

## 2022-12-21 PROCEDURE — 99214 OFFICE O/P EST MOD 30 MIN: CPT | Mod: S$PBB,,, | Performed by: PSYCHIATRY & NEUROLOGY

## 2022-12-21 PROCEDURE — 99999 PR PBB SHADOW E&M-EST. PATIENT-LVL II: ICD-10-PCS | Mod: PBBFAC,,, | Performed by: PSYCHIATRY & NEUROLOGY

## 2022-12-21 PROCEDURE — 99999 PR PBB SHADOW E&M-EST. PATIENT-LVL II: CPT | Mod: PBBFAC,,, | Performed by: PSYCHIATRY & NEUROLOGY

## 2022-12-21 PROCEDURE — 99214 PR OFFICE/OUTPT VISIT, EST, LEVL IV, 30-39 MIN: ICD-10-PCS | Mod: S$PBB,,, | Performed by: PSYCHIATRY & NEUROLOGY

## 2022-12-21 PROCEDURE — 99212 OFFICE O/P EST SF 10 MIN: CPT | Mod: PBBFAC | Performed by: PSYCHIATRY & NEUROLOGY

## 2022-12-21 RX ORDER — QUETIAPINE FUMARATE 50 MG/1
50 TABLET, FILM COATED ORAL EVERY 6 HOURS PRN
Qty: 60 TABLET | Refills: 12 | Status: SHIPPED | OUTPATIENT
Start: 2022-12-21

## 2022-12-21 RX ORDER — LAMOTRIGINE 100 MG/1
TABLET ORAL
Qty: 60 TABLET | Refills: 12 | Status: SHIPPED | OUTPATIENT
Start: 2022-12-21

## 2022-12-21 RX ORDER — CITALOPRAM 40 MG/1
40 TABLET, FILM COATED ORAL DAILY
Qty: 30 TABLET | Refills: 12 | Status: SHIPPED | OUTPATIENT
Start: 2022-12-21 | End: 2023-01-09 | Stop reason: SDUPTHER

## 2022-12-21 RX ORDER — QUETIAPINE FUMARATE 100 MG/1
100 TABLET, FILM COATED ORAL 3 TIMES DAILY
Qty: 90 TABLET | Refills: 12 | Status: SHIPPED | OUTPATIENT
Start: 2022-12-21

## 2022-12-21 RX ORDER — PALIPERIDONE 3 MG/1
TABLET, EXTENDED RELEASE ORAL
Qty: 30 TABLET | Refills: 12 | Status: SHIPPED | OUTPATIENT
Start: 2022-12-21

## 2022-12-21 NOTE — PROGRESS NOTES
"Outpatient Psychiatry Follow-Up Visit (MD/NP)   12/21/2022    Clinical Status of Patient: Outpatient (Ambulatory)     Session Length:  30 minutes (E&M)      Chief Complaint: Bernard Olvera is a 52 y.o. male who presents today for follow-up of impulsivity, irritibility, mental retardation.   Met with patient and 1 caregiver -- (Daniela).        Interval History and Content of Current Session:   General impression:  First appointment with me since 5/3/2022.  History of interim events is obtained from [facility], as pt cannot communicate verbally -- see below.    His caregiver provides collateral Hx, as pt is near mute and is unable to provide any meaningful information.      Target symptoms: Impulsivity, irritibility, voracious appetite, self-mutilation, mental retardation.     Interim events:    Med plan at last appt:  "Try Celexa 20 mg one tablet daily for anxiety and compulsive skin picking.  Caregivers may give this med at night if it causes drowsiness.    Continue Invega 3 mg daily.    Continue Seroquel 100 mg BID and 50 mg qhs.    Continue Seroquel 25 mg one tablet up to q 6 hrs prn agitation.   Continue all other current medications with refills as noted."       Resident of  -- he lives in an apt with 24-hour caregivers.      Unfortunately, he has been doing more self-mutilating behaviors.    He has been scratching/digging into his scalp or arm, draws blood.  Multiple well-healing scabs noted on his scalp.    He will also occasionally bite his hand or wrist.    He also will sometimes hit his head vs the wall, often causes the scalp to bleed.    He also will sometimes  a table and drop it on the floor & make a loud noise, or will throw objects on the floor.      He has his own room.  He recently was barred from the day center because of the recent increase in his disruptive behaviors.      He has been moved into a new residence with new housemates -- he moved there about 3 - 4 months ago.     Per " caregiver, she thinks his behaviors have been worse since the move.     He also has new caregivers who work with him.    He also does not sleep as well at night.    He has even tried to leave out of the apt at night and must be either physically brought back or barred from doing so.    No recent physical altercations.      He often smiles and/or laughs after acting out in ways he knows he should not.      ADL's: He can feed self and toilet, brushes his teeth; he needs some assistance with donning clothes correctly.    He likes to watch certain shows on TV, mainly cartoons.  He has toys, stuffed animals.    He will occasionally hit his hand vs his head, or vs the wall or door, likely out of boredom more than anything else.    No verbal or physical violence towards anyone recently.    She states he still picks at skin to the point of drawing blood.    He has been responding fairly well to redirection.      He continues to have problems with eating too fast, not chewing his food.    His caregiver must cut up the food for him.    He will eat too fast at times, which likely causes gagging and regurgitation of food.  He has to be prompted to eat more slowly, to chew his food.      She denies any recent rumination of food.  No difficulty swallowing chewed food.    He has slowly gained wt over the past year.      He has been compliant taking his meds as below.          He is at the [address] (on [facility]) -- he is NOT with [facility] as before.      They give him all of his night meds around 8 - 8:30 PM.      He does not speak words -- he only makes grunting sounds and non-verbal expressions.      Review of Systems   PSYCHIATRIC: Pertinant items are noted in the narrative.  CONSTITUTIONAL: Recent wt gain.  MUSCULOSKELETAL: No pain or stiffness of the joints.  NEUROLOGIC: No weakness, sensory changes, seizures, tremor or other abnormal movements.  ENDOCRINE: No polydipsia or polyuria.  INTEGUMENTARY: no rash, no  lacerations.  EYES: No exophthalmos, jaundice or blindness.  ENT: No dizziness, tinnitus or hearing loss.  RESPIRATORY: No shortness of breath.  CARDIOVASCULAR: No tachycardia or chest pain.  GASTROINTESTINAL: No nausea, vomiting, pain, constipation or diarrhea.  GENITOURINARY: No frequency, dysuria.         Current Outpatient Medications:     citalopram (CELEXA) 20 MG tablet, Take 1 tablet (20 mg total) by mouth once daily., Disp: 30 tablet, Rfl: 6    docusate sodium (COLACE) 100 MG capsule, Take 100 mg by mouth 2 (two) times daily., Disp: , Rfl:     ketoconazole (NIZORAL) 2 % shampoo, USE TO WASH HAIR TWICE A WEEK AS DIRECTED, Disp: 240 mL, Rfl: 4    lamoTRIgine (LAMICTAL) 100 MG tablet, TAKE 1 TAB BY MOUTH TWICE AT 8AM AND 8PM, Disp: 60 tablet, Rfl: 6    levothyroxine (SYNTHROID) 50 MCG tablet, TAKE 1 TAB BY MOUTH BEFORE BREAKFAST DAILY AT 8AM, Disp: 30 tablet, Rfl: 11    multivitamin capsule, Take 1 capsule by mouth once daily., Disp: 30 capsule, Rfl: 11    omega-3 fatty acids-vitamin E 1,000 mg Cap, [facility], Disp: 60 each, Rfl: 11    paliperidone (INVEGA) 3 MG TR24, TAKE 1 TAB BY MOUTH EVERY DAY, Disp: 30 tablet, Rfl: 6    pantoprazole (PROTONIX) 40 MG tablet, TAKE 1 TAB BY MOUTH ONCE DAILY AT 8AM, Disp: 30 tablet, Rfl: 11    polyethylene glycol (GLYCOLAX) 17 gram/dose powder, Take 17 g by mouth once daily. (Patient not taking: Reported on 7/1/2022), Disp: 595 g, Rfl: 3    propranoloL (INDERAL LA) 60 MG 24 hr capsule, TAKE 1 CAP BY MOUTH ONCE DAILY AT 8PM, Disp: 90 capsule, Rfl: 3    QUEtiapine (SEROQUEL) 100 MG Tab, Take oral one tablet BID at 7 am and 12 noon, Disp: 60 tablet, Rfl: 6    QUEtiapine (SEROQUEL) 25 MG Tab, Take 2 tablets (50 mg total) by mouth every 6 (six) hours as needed (agitation)., Disp: 120 tablet, Rfl: 6    QUEtiapine (SEROQUEL) 50 MG tablet, Take 1 tablet (50 mg total) by mouth nightly., Disp: 30 tablet, Rfl: 6    senna (SENOKOT) 8.6 mg tablet, Take 2 tablets by mouth once  daily., Disp: , Rfl:     triamcinolone acetonide 0.1% (KENALOG) 0.1 % cream, Apply topically 2 (two) times daily., Disp: 90 g, Rfl: 3    VITAMIN D3 1,000 unit tablet, TAKE 1 TABLET BY MOUTH EVERY DAY, Disp: 30 tablet, Rfl: 2    Compliance: Yes     Side effects: None     Risk Parameters:   Patient reports no suicidal ideation   Patient reports no homicidal ideation   Patient reports no self-injurious behavior   Patient reports no violent behavior     Patient's Response to Intervention:   The patient's response to intervention is accepting.     Progress Toward Goals and Other Mental Status Changes:   The patient's progress toward goals is limited.     Vitals: Most recent vital signs, dated today just prior to this appointment, were reviewed:     Vitals:    12/21/22 1537   BP: 111/64   Pulse: 77   Weight: 61.5 kg (135 lb 9.3 oz)       Vitals - 1 value per visit 7/20/2021 5/3/2022 7/1/2022 12/21/2022   SYSTOLIC 116  100 111   DIASTOLIC 62  60 64   Pulse 89  78 77   Temp       Resp       SPO2 95  98    Weight (lb) 125.22 131 134.26 135.58   Weight (kg) 56.8 59.421 60.9 61.5   Height 66 66 66    BMI (Calculated) 20.2 21.2 21.7    VISIT REPORT       Pain Score            Vitals - 1 value per visit 9/4/2020 10/23/2020 11/19/2020   SYSTOLIC 110 129 117   DIASTOLIC 80 80 72   Pulse 86 80 90   Temp 97.7  97.7   Resp   18   SPO2 98     Weight (lb) 117.06 115.3 115.52   Weight (kg) 53.1 52.3 52.4   Height 66  66   BMI (Calculated) 18.9  18.7   VISIT REPORT      Pain Score           Labs:    Lab Visit on 07/01/2022   Component Date Value Ref Range Status    WBC 07/01/2022 5.53  3.90 - 12.70 K/uL Final    RBC 07/01/2022 4.36 (L)  4.60 - 6.20 M/uL Final    Hemoglobin 07/01/2022 12.7 (L)  14.0 - 18.0 g/dL Final    Hematocrit 07/01/2022 39.1 (L)  40.0 - 54.0 % Final    MCV 07/01/2022 90  82 - 98 fL Final    MCH 07/01/2022 29.1  27.0 - 31.0 pg Final    MCHC 07/01/2022 32.5  32.0 - 36.0 g/dL Final    RDW 07/01/2022 12.1  11.5 - 14.5 %  Final    Platelets 07/01/2022 238  150 - 450 K/uL Final    MPV 07/01/2022 10.3  9.2 - 12.9 fL Final    Immature Granulocytes 07/01/2022 0.2  0.0 - 0.5 % Final    Gran # (ANC) 07/01/2022 3.4  1.8 - 7.7 K/uL Final    Immature Grans (Abs) 07/01/2022 0.01  0.00 - 0.04 K/uL Final    Lymph # 07/01/2022 1.4  1.0 - 4.8 K/uL Final    Mono # 07/01/2022 0.4  0.3 - 1.0 K/uL Final    Eos # 07/01/2022 0.3  0.0 - 0.5 K/uL Final    Baso # 07/01/2022 0.03  0.00 - 0.20 K/uL Final    nRBC 07/01/2022 0  0 /100 WBC Final    Gran % 07/01/2022 61.3  38.0 - 73.0 % Final    Lymph % 07/01/2022 24.4  18.0 - 48.0 % Final    Mono % 07/01/2022 8.0  4.0 - 15.0 % Final    Eosinophil % 07/01/2022 5.6  0.0 - 8.0 % Final    Basophil % 07/01/2022 0.5  0.0 - 1.9 % Final    Differential Method 07/01/2022 Automated   Final    Sodium 07/01/2022 138  136 - 145 mmol/L Final    Potassium 07/01/2022 3.8  3.5 - 5.1 mmol/L Final    Chloride 07/01/2022 101  95 - 110 mmol/L Final    CO2 07/01/2022 28  23 - 29 mmol/L Final    Glucose 07/01/2022 91  70 - 110 mg/dL Final    BUN 07/01/2022 11  6 - 20 mg/dL Final    Creatinine 07/01/2022 0.8  0.5 - 1.4 mg/dL Final    Calcium 07/01/2022 9.8  8.7 - 10.5 mg/dL Final    Total Protein 07/01/2022 7.4  6.0 - 8.4 g/dL Final    Albumin 07/01/2022 4.2  3.5 - 5.2 g/dL Final    Total Bilirubin 07/01/2022 0.7  0.1 - 1.0 mg/dL Final    Alkaline Phosphatase 07/01/2022 80  55 - 135 U/L Final    AST 07/01/2022 20  10 - 40 U/L Final    ALT 07/01/2022 12  10 - 44 U/L Final    Anion Gap 07/01/2022 9  8 - 16 mmol/L Final    eGFR if African American 07/01/2022 >60.0  >60 mL/min/1.73 m^2 Final    eGFR if non African American 07/01/2022 >60.0  >60 mL/min/1.73 m^2 Final    Cholesterol 07/01/2022 219 (H)  120 - 199 mg/dL Final    Triglycerides 07/01/2022 128  30 - 150 mg/dL Final    HDL 07/01/2022 36 (L)  40 - 75 mg/dL Final    LDL Cholesterol 07/01/2022 157.4  63.0 - 159.0 mg/dL Final    HDL/Cholesterol Ratio 07/01/2022 16.4 (L)  20.0 - 50.0  % Final    Total Cholesterol/HDL Ratio 07/01/2022 6.1 (H)  2.0 - 5.0 Final    Non-HDL Cholesterol 07/01/2022 183  mg/dL Final    TSH 07/01/2022 2.241  0.400 - 4.000 uIU/mL Final    Vit D, 25-Hydroxy 07/01/2022 56  30 - 96 ng/mL Final    Vitamin B-12 07/01/2022 1371 (H)  210 - 950 pg/mL Final         Mental Status Evaluation      Appearance:  casually dressed, disheveled   Behavior:  Not fully cooperative, impulsive, but mostly calm and redirectable, eye contact minimal   Speech:  no intelligible speech; sometimes makes noises: grunts, moans    Mood:  Unable to assess; patient cannot verbally communicate    Affect:  Euthymic to mildly irritible at times, blunted, constricted   Thought Process:  Profound poverty of thought    Thought Content:  normal, no suicidality, no homicidality, delusions, or paranoia    Sensorium:  Unable to assess; patient cannot verbally communicate   Attention Span & Concentration  Poor    Cognition:  severely impaired    Insight:  very poor    Judgment:  very poor      AIMS Screening:      Facial and Oral Movements  Muscles of Facial Expression: Mild (blunting)  Lips and Perioral Area:  (Pt not fully cooperative with exam; no abnormalities noted)  Jaw:  (Pt not fully cooperative with exam; no abnormalities noted)  Tongue:  (Pt not fully cooperative with exam; no abnormalities noted)    Extremity Movements  Upper (arms, wrists, hands, fingers): None, normal (no cogwheel rigidity; no tremors observed)  Lower (legs, knees, ankles, toes): None, normal (unremarkable; no shuffling of gait)    Trunk Movements  Neck, shoulders, hips: None, normal    Overall Severity  Severity of abnormal movements (highest score from questions above): 2  Incapacitation due to abnormal movements: None, normal  Patient's awareness of abnormal movements (rate only patient's report):  (pt unable to state)    Dental Status  Current problems with teeth and/or dentures?: No  Does patient usually wear dentures?: No      Impression:   Intellectual Disability, profound  Impulse-Control Disorder, unspecified  Compulsive Skin Picking  Unspecified Psychosis -- only for coding purposes for insurance coverage of pt's neuroleptic meds   Akathisia, improving (NOT CODED)   Seizure Disorder   Hx of Fatty liver with elevated liver enzymes (NOT CODED)    Plan:  Medication management:  Increase citalopram to 40 mg one tablet daily for anxiety and compulsive skin picking.  Caregivers may give this med at night if it causes drowsiness.    Continue Invega 3 mg daily.    Change Seroquel to 100 mg TID (8 am, noon and 8 pm).    Increase PRN Seroquel to 50 mg one tablet up to q 6 hrs prn agitation.   Continue all other current medications with refills as noted.           Additional Notes:  Follow up with Dr. Elaina Patterson for medical issues.     Return to Clinic: 6 months, or sooner prn.    AIMS DONE TODAY.  NEXT AIMS DUE 06/'23.

## 2023-01-09 ENCOUNTER — PATIENT MESSAGE (OUTPATIENT)
Dept: INTERNAL MEDICINE | Facility: CLINIC | Age: 53
End: 2023-01-09
Payer: MEDICARE

## 2023-01-09 RX ORDER — FERROUS SULFATE 325(65) MG
325 TABLET ORAL DAILY
Qty: 30 TABLET | Refills: 6 | Status: SHIPPED | OUTPATIENT
Start: 2023-01-09

## 2023-01-09 RX ORDER — FERROUS SULFATE 325(65) MG
1 TABLET ORAL DAILY
COMMUNITY
End: 2023-01-09 | Stop reason: SDUPTHER

## 2023-05-25 ENCOUNTER — PES CALL (OUTPATIENT)
Dept: ADMINISTRATIVE | Facility: CLINIC | Age: 53
End: 2023-05-25
Payer: MEDICARE

## 2023-07-25 RX ORDER — MULTIVIT WITH IRON,MINERALS
TABLET ORAL
Qty: 60 CAPSULE | Refills: 10 | Status: SHIPPED | OUTPATIENT
Start: 2023-07-25

## 2023-07-28 ENCOUNTER — PES CALL (OUTPATIENT)
Dept: ADMINISTRATIVE | Facility: CLINIC | Age: 53
End: 2023-07-28
Payer: MEDICARE

## 2023-08-19 DIAGNOSIS — R25.1 TREMOR: ICD-10-CM

## 2023-08-19 NOTE — TELEPHONE ENCOUNTER
Care Due:                  Date            Visit Type   Department     Provider  --------------------------------------------------------------------------------                                [facility]                              ANNUAL                              CHECKUP/PHY  [facility] INTERNAL  Last Visit: 07-      [facility]       JOSÉ MIGUEL SIMS                              [facility]                              ANNUAL                              CHECKUP/PHY  [facility] INTERNAL  Next Visit: 09-      [facility]       JOSÉ MIGUEL SIMS                                                            Last  Test          Frequency    Reason                     Performed    Due Date  --------------------------------------------------------------------------------    TSH.........  12 months..  levothyroxine............  07- 06-    [facility] Embedded Care Due Messages. Reference number: [id number].   8/19/2023 1:15:52 AM CDT

## 2023-08-20 RX ORDER — PROPRANOLOL HYDROCHLORIDE 60 MG/1
CAPSULE, EXTENDED RELEASE ORAL
Qty: 90 CAPSULE | Refills: 0 | Status: SHIPPED | OUTPATIENT
Start: 2023-08-20 | End: 2023-12-12

## 2023-08-20 NOTE — TELEPHONE ENCOUNTER
Provider Staff:  Action required for this patient     Please see care gap opportunities below in Care Due Message.    Thanks!  [facility]     Appointments      Date Provider   Last Visit   7/1/2022 Elaina Patterson MD   Next Visit   9/28/2023 Elaina Patterson MD      Refill Decision Note   Bernard Olvera  is requesting a refill authorization.  Brief Assessment and Rationale for Refill:  Approve     Medication Therapy Plan:         Comments:     Note composed:3:58 AM 08/20/2023

## 2023-09-24 RX ORDER — SENNOSIDES 8.6 MG
TABLET ORAL
Qty: 60 TABLET | Refills: 10 | Status: SHIPPED | OUTPATIENT
Start: 2023-09-24

## 2023-09-24 RX ORDER — DOCUSATE SODIUM 100 MG/1
CAPSULE, LIQUID FILLED ORAL
Qty: 60 CAPSULE | Refills: 10 | Status: SHIPPED | OUTPATIENT
Start: 2023-09-24

## 2023-09-24 RX ORDER — CHOLECALCIFEROL (VITAMIN D3) 25 MCG
1000 TABLET ORAL
Qty: 30 TABLET | Refills: 10 | Status: SHIPPED | OUTPATIENT
Start: 2023-09-24

## 2023-09-28 ENCOUNTER — OFFICE VISIT (OUTPATIENT)
Dept: INTERNAL MEDICINE | Facility: CLINIC | Age: 53
End: 2023-09-28
Payer: MEDICARE

## 2023-09-28 ENCOUNTER — LAB VISIT (OUTPATIENT)
Dept: LAB | Facility: HOSPITAL | Age: 53
End: 2023-09-28
Attending: INTERNAL MEDICINE
Payer: MEDICARE

## 2023-09-28 VITALS
WEIGHT: 126.75 LBS | DIASTOLIC BLOOD PRESSURE: 60 MMHG | HEART RATE: 71 BPM | OXYGEN SATURATION: 97 % | SYSTOLIC BLOOD PRESSURE: 92 MMHG | HEIGHT: 66 IN | BODY MASS INDEX: 20.37 KG/M2

## 2023-09-28 DIAGNOSIS — F73 PROFOUND INTELLECTUAL DISABILITY: ICD-10-CM

## 2023-09-28 DIAGNOSIS — E03.9 HYPOTHYROIDISM, UNSPECIFIED TYPE: Primary | ICD-10-CM

## 2023-09-28 DIAGNOSIS — E03.9 HYPOTHYROIDISM, UNSPECIFIED TYPE: ICD-10-CM

## 2023-09-28 DIAGNOSIS — E78.49 OTHER HYPERLIPIDEMIA: ICD-10-CM

## 2023-09-28 DIAGNOSIS — R79.9 ABNORMAL BLOOD CHEMISTRY: ICD-10-CM

## 2023-09-28 DIAGNOSIS — Z12.5 SCREENING PSA (PROSTATE SPECIFIC ANTIGEN): ICD-10-CM

## 2023-09-28 DIAGNOSIS — F63.9 IMPULSE CONTROL DISORDER: ICD-10-CM

## 2023-09-28 DIAGNOSIS — E55.9 VITAMIN D DEFICIENCY DISEASE: ICD-10-CM

## 2023-09-28 DIAGNOSIS — G40.909 SEIZURE DISORDER: ICD-10-CM

## 2023-09-28 LAB
ALBUMIN SERPL BCP-MCNC: 4.1 G/DL (ref 3.5–5.2)
ALP SERPL-CCNC: 79 U/L (ref 55–135)
ALT SERPL W/O P-5'-P-CCNC: 15 U/L (ref 10–44)
ANION GAP SERPL CALC-SCNC: 6 MMOL/L (ref 8–16)
AST SERPL-CCNC: 17 U/L (ref 10–40)
BASOPHILS # BLD AUTO: 0.04 K/UL (ref 0–0.2)
BASOPHILS NFR BLD: 0.8 % (ref 0–1.9)
BILIRUB SERPL-MCNC: 0.6 MG/DL (ref 0.1–1)
BUN SERPL-MCNC: 14 MG/DL (ref 6–20)
CALCIUM SERPL-MCNC: 9.7 MG/DL (ref 8.7–10.5)
CHLORIDE SERPL-SCNC: 103 MMOL/L (ref 95–110)
CHOLEST SERPL-MCNC: 275 MG/DL (ref 120–199)
CHOLEST/HDLC SERPL: 7.9 {RATIO} (ref 2–5)
CO2 SERPL-SCNC: 28 MMOL/L (ref 23–29)
COMPLEXED PSA SERPL-MCNC: 0.5 NG/ML (ref 0–4)
CREAT SERPL-MCNC: 1 MG/DL (ref 0.5–1.4)
DIFFERENTIAL METHOD: ABNORMAL
EOSINOPHIL # BLD AUTO: 0.2 K/UL (ref 0–0.5)
EOSINOPHIL NFR BLD: 4.6 % (ref 0–8)
ERYTHROCYTE [DISTWIDTH] IN BLOOD BY AUTOMATED COUNT: 12.1 % (ref 11.5–14.5)
EST. GFR  (NO RACE VARIABLE): >60 ML/MIN/1.73 M^2
ESTIMATED AVG GLUCOSE: 88 MG/DL (ref 68–131)
GLUCOSE SERPL-MCNC: 90 MG/DL (ref 70–110)
HBA1C MFR BLD: 4.7 % (ref 4–5.6)
HCT VFR BLD AUTO: 39.4 % (ref 40–54)
HDLC SERPL-MCNC: 35 MG/DL (ref 40–75)
HDLC SERPL: 12.7 % (ref 20–50)
HGB BLD-MCNC: 12.8 G/DL (ref 14–18)
IMM GRANULOCYTES # BLD AUTO: 0.01 K/UL (ref 0–0.04)
IMM GRANULOCYTES NFR BLD AUTO: 0.2 % (ref 0–0.5)
LDLC SERPL CALC-MCNC: 204 MG/DL (ref 63–159)
LYMPHOCYTES # BLD AUTO: 1.1 K/UL (ref 1–4.8)
LYMPHOCYTES NFR BLD: 20.2 % (ref 18–48)
MCH RBC QN AUTO: 29.8 PG (ref 27–31)
MCHC RBC AUTO-ENTMCNC: 32.5 G/DL (ref 32–36)
MCV RBC AUTO: 92 FL (ref 82–98)
MONOCYTES # BLD AUTO: 0.4 K/UL (ref 0.3–1)
MONOCYTES NFR BLD: 6.9 % (ref 4–15)
NEUTROPHILS # BLD AUTO: 3.5 K/UL (ref 1.8–7.7)
NEUTROPHILS NFR BLD: 67.3 % (ref 38–73)
NONHDLC SERPL-MCNC: 240 MG/DL
NRBC BLD-RTO: 0 /100 WBC
PLATELET # BLD AUTO: 242 K/UL (ref 150–450)
PMV BLD AUTO: 10.8 FL (ref 9.2–12.9)
POTASSIUM SERPL-SCNC: 4.3 MMOL/L (ref 3.5–5.1)
PROT SERPL-MCNC: 7.2 G/DL (ref 6–8.4)
RBC # BLD AUTO: 4.3 M/UL (ref 4.6–6.2)
SODIUM SERPL-SCNC: 137 MMOL/L (ref 136–145)
TRIGL SERPL-MCNC: 180 MG/DL (ref 30–150)
TSH SERPL DL<=0.005 MIU/L-ACNC: 1.72 UIU/ML (ref 0.4–4)
WBC # BLD AUTO: 5.25 K/UL (ref 3.9–12.7)

## 2023-09-28 PROCEDURE — 85025 COMPLETE CBC W/AUTO DIFF WBC: CPT | Performed by: INTERNAL MEDICINE

## 2023-09-28 PROCEDURE — 99999 PR PBB SHADOW E&M-EST. PATIENT-LVL IV: CPT | Mod: PBBFAC,,, | Performed by: INTERNAL MEDICINE

## 2023-09-28 PROCEDURE — 99214 OFFICE O/P EST MOD 30 MIN: CPT | Mod: PBBFAC | Performed by: INTERNAL MEDICINE

## 2023-09-28 PROCEDURE — 84153 ASSAY OF PSA TOTAL: CPT | Performed by: INTERNAL MEDICINE

## 2023-09-28 PROCEDURE — 99999 PR PBB SHADOW E&M-EST. PATIENT-LVL IV: ICD-10-PCS | Mod: PBBFAC,,, | Performed by: INTERNAL MEDICINE

## 2023-09-28 PROCEDURE — 99214 PR OFFICE/OUTPT VISIT, EST, LEVL IV, 30-39 MIN: ICD-10-PCS | Mod: S$PBB,,, | Performed by: INTERNAL MEDICINE

## 2023-09-28 PROCEDURE — 80061 LIPID PANEL: CPT | Performed by: INTERNAL MEDICINE

## 2023-09-28 PROCEDURE — 80053 COMPREHEN METABOLIC PANEL: CPT | Performed by: INTERNAL MEDICINE

## 2023-09-28 PROCEDURE — 36415 COLL VENOUS BLD VENIPUNCTURE: CPT | Performed by: INTERNAL MEDICINE

## 2023-09-28 PROCEDURE — 99214 OFFICE O/P EST MOD 30 MIN: CPT | Mod: S$PBB,,, | Performed by: INTERNAL MEDICINE

## 2023-09-28 PROCEDURE — 84443 ASSAY THYROID STIM HORMONE: CPT | Performed by: INTERNAL MEDICINE

## 2023-09-28 PROCEDURE — 83036 HEMOGLOBIN GLYCOSYLATED A1C: CPT | Performed by: INTERNAL MEDICINE

## 2023-09-28 NOTE — PROGRESS NOTES
CHIEF COMPLAINT:  Follow up seizure disorder, hyperlipidemia, behavior disorder.     HISTORY OF PRESENT ILLNESS: 53-year-old man who presents with a caregiver, Daniela Hutson (works with him daily for the last 5 years).  His  is Emily  All history is obtained from Daniela due to his mental handicapped.   He still gets his care through [address]. Bernard is no longer going to the day program as it was too much stimulation for him and he had behavioral problems.  HE lives in a house with 3 other individuals.  He has his own room and bathroom.  . He is happy.  He continues to pick his arms- has not been as bad. He does not have calluses. Appetite is good.  He eats well.       He is asleep at 10 pm.  He gets up at 6 am.  No naps. . HE sleeps all night.       His sister, Vibha is involved in his life.      No apparent seiizure activity.  He continues to take lamictal 100 mg twice daily for his seizure disorder. He saw Dr Nazario 1/7/21       He is currently taking citalopram 20 mg once daily,  Intuniv 3 mg daily, Invega 3 mg daily and Propranolol LA 60 mg daily, seroquel 100 mg at 7 am, 100 mg at noon and 50 mg at 7 pm .  HE is followed by Dr Vides - last seen 12/21/22         He is  on Synthroid 50 mcg daily for hypothryoidism. No apparent fatigue         He is on vitamin D 1000 units daily for vitamin D deficiency      He is taking pantoprazole 40 mg in am for reflux.     PAST MEDICAL HISTORY:   1. Moderate to severe mental handicap.   2. Stereotypical movement disorder.   3. Impulse control disorder.   4. Seizure disorder.   5. Right undescended testis removed at age 1.   6. Hyperlipidemia - off meds currently.   7. Transaminiitis  8. Hypothyroidism    SOCIAL HISTORY: He does not smoke, does not drink. He is in supported independent living through [employer].     MEDICATIONS and ALLERGIES: Updated on epic     Review of systems: no fever, chills, visual change, hearing issues, sinus congestion,  sore throat, shortness of breath, chest pain, abdominal pain, nausea, voimting, constipation, diarrhea, heartburn, urinary frequency, nocturia, joint pain or muscle pain, rashes, headaches, excessive thrist    PHYSICAL EXAMINATION:              General: Alert, oriented. No apparent distress. Affect within normal   limits. Nonverbal.   Conjunctivae anicteric. Tympanic membranes clear. Oropharynx clear.   Neck supple.   Respiratory effort normal. Lungs clear to auscultation.   Heart: Regular rate and rhythm without murmurs, gallops or rubs.   No lower extremity edema.   ABDOMEN: soft, non distended, non tender, bowel sounds present, no hepatosplenomgaly  BREAST: no abn seen, no nodules palpated, no axillary lad  Feet without calluses, erythema or warmth   Right testicle removed. Left testicle no masses.              ASSESSMENT AND PLAN:     1.  Seizure disorder - stable on Lamictal.   2. Moderately mentally handicapped with impulse control disorder -follow up with Dr Vides  3. Hypothyroidism - TSH -  4. Lichen planus vs psoriasis on arm - improved  5. Vitamin D deficiency - on replacement  . Famiily declines colonoscopy  I'll see him back in 12 months, sooner if problems arise.

## 2023-11-25 DIAGNOSIS — F63.9 IMPULSE CONTROL DISORDER: ICD-10-CM

## 2023-11-25 DIAGNOSIS — F42.4 COMPULSIVE SKIN PICKING: ICD-10-CM

## 2023-11-25 RX ORDER — LEVOTHYROXINE SODIUM 50 UG/1
50 TABLET ORAL
Qty: 90 TABLET | Refills: 3 | Status: SHIPPED | OUTPATIENT
Start: 2023-11-25

## 2023-11-25 RX ORDER — PANTOPRAZOLE SODIUM 40 MG/1
40 TABLET, DELAYED RELEASE ORAL DAILY
Qty: 90 TABLET | Refills: 3 | Status: SHIPPED | OUTPATIENT
Start: 2023-11-25

## 2023-11-25 NOTE — TELEPHONE ENCOUNTER
No care due was identified.  [facility] Embedded Care Due Messages. Reference number: [id number].   11/25/2023 1:15:31 AM CST

## 2023-11-27 RX ORDER — CITALOPRAM 40 MG/1
40 TABLET, FILM COATED ORAL DAILY
Qty: 30 TABLET | Refills: 11 | Status: SHIPPED | OUTPATIENT
Start: 2023-11-27

## 2024-05-28 ENCOUNTER — OFFICE VISIT (OUTPATIENT)
Dept: PSYCHIATRY | Facility: CLINIC | Age: 54
End: 2024-05-28
Payer: MEDICARE

## 2024-05-28 VITALS
DIASTOLIC BLOOD PRESSURE: 64 MMHG | WEIGHT: 125.25 LBS | BODY MASS INDEX: 20.21 KG/M2 | SYSTOLIC BLOOD PRESSURE: 104 MMHG | HEART RATE: 85 BPM

## 2024-05-28 DIAGNOSIS — F63.9 IMPULSE CONTROL DISORDER: ICD-10-CM

## 2024-05-28 DIAGNOSIS — F73 PROFOUND INTELLECTUAL DISABILITY: Primary | ICD-10-CM

## 2024-05-28 DIAGNOSIS — F42.4 COMPULSIVE SKIN PICKING: ICD-10-CM

## 2024-05-28 DIAGNOSIS — F29 PSYCHOSIS, UNSPECIFIED PSYCHOSIS TYPE: ICD-10-CM

## 2024-05-28 DIAGNOSIS — G40.909 SEIZURE DISORDER: ICD-10-CM

## 2024-05-28 DIAGNOSIS — R45.1 AGITATION: ICD-10-CM

## 2024-05-28 PROCEDURE — 99212 OFFICE O/P EST SF 10 MIN: CPT | Mod: PBBFAC | Performed by: PSYCHIATRY & NEUROLOGY

## 2024-05-28 PROCEDURE — 99214 OFFICE O/P EST MOD 30 MIN: CPT | Mod: S$PBB,,, | Performed by: PSYCHIATRY & NEUROLOGY

## 2024-05-28 PROCEDURE — 99999 PR PBB SHADOW E&M-EST. PATIENT-LVL II: CPT | Mod: PBBFAC,,, | Performed by: PSYCHIATRY & NEUROLOGY

## 2024-05-28 RX ORDER — CITALOPRAM 40 MG/1
40 TABLET, FILM COATED ORAL DAILY
Qty: 30 TABLET | Refills: 12 | Status: SHIPPED | OUTPATIENT
Start: 2024-05-28

## 2024-05-28 RX ORDER — LAMOTRIGINE 100 MG/1
TABLET ORAL
Qty: 60 TABLET | Refills: 12 | Status: SHIPPED | OUTPATIENT
Start: 2024-05-28

## 2024-05-28 RX ORDER — QUETIAPINE FUMARATE 50 MG/1
50 TABLET, FILM COATED ORAL EVERY 6 HOURS PRN
Qty: 60 TABLET | Refills: 12 | Status: SHIPPED | OUTPATIENT
Start: 2024-05-28

## 2024-05-28 RX ORDER — QUETIAPINE FUMARATE 100 MG/1
100 TABLET, FILM COATED ORAL 3 TIMES DAILY
Qty: 90 TABLET | Refills: 12 | Status: SHIPPED | OUTPATIENT
Start: 2024-05-28

## 2024-05-28 RX ORDER — PALIPERIDONE 3 MG/1
TABLET, EXTENDED RELEASE ORAL
Qty: 30 TABLET | Refills: 12 | Status: SHIPPED | OUTPATIENT
Start: 2024-05-28

## 2024-05-28 NOTE — PROGRESS NOTES
"Outpatient Psychiatry Follow-Up Visit (MD/NP)   05/28/2024    Clinical Status of Patient: Outpatient (Ambulatory)     Session Length:  30 minutes (E&M level 4)      Chief Complaint: Bernard Olvera is a 53 y.o. male who presents today for follow-up of impulsivity, irritibility, mental retardation.   Met with patient and 1 caregiver -- (Daniela).        Interval History and Content of Current Session:   General impression:  First appointment with me since 12/21/2022 (> 1 year).  History of interim events is obtained from caregiver, as pt cannot communicate verbally -- see below.    His caregiver provides collateral Hx, as pt is near mute and is unable to provide any meaningful information.      Target symptoms: Impulsivity, irritibility, voracious appetite, self-mutilation, mental retardation.     Interim events:    Med plan at last appt:   "Increase citalopram to 40 mg one tablet daily for anxiety and compulsive skin picking.  Caregivers may give this med at night if it causes drowsiness.    Continue Invega 3 mg daily.    Change Seroquel to 100 mg TID (8 am, noon and 8 pm).    Increase PRN Seroquel to 50 mg one tablet up to q 6 hrs prn agitation.   Continue all other current medications with refills as noted."      He resides at the [address] (on [facility]) since 2022 -- he is NOT at [facility] as before.      They give him all of his night meds around 8 - 8:30 PM.   He lives in an apt with 24-hour caregivers.      He's "more hyper" now per caregiver.    Caregiver states she has had to take off more days lately because of her own needs.  This may be causing more problems with his behaviors and anxiety/self-mutilation, as it's a change in routine.       He continues to have self-mutilating behaviors.    He continues to pick at his arms, nose; not as much on the scalp.    He bathes nightly.  He needs minimal assistance, except when defecating.    He still occasionally bites his hand or wrist.    He also will " sometimes hit his forehead with his hand.      He also will sometimes  a table and drop it on the floor & make a loud noise, or will throw objects on the floor.    He often smiles and/or chuckles after acting out in ways he knows he should not.     Overall, he has been responding fairly well to redirection.     He has his own room.   Pt still impulsively takes other individuals' food.  He will often take his house mates' food.  Genaro is one of his house mates.  Genaro is less often in his room, so Bernard has more access to him in the common area.  They often pinch each other.  Pt has not been in a true physical altercation for quite some time.       Pt spontaneously gets up from the chair during session and goes into my bathroom to urinate.  He also has opened food and beverage containers to consume the contents in the past without warning.    ADL's: He can feed self and toilet, brushes his teeth; he needs some assistance with donning clothes correctly.    He likes to watch certain shows on TV, mainly cartoons.  He has toys, stuffed animals.    He will occasionally hit his hand vs his head, or vs the wall or door, likely out of boredom.      Regarding eating:  He continues to have problems with eating too fast, not chewing his food.    His caregiver must cut up the food for him for safety reasons.    He will eat too fast at times, which likely causes gagging and regurgitation of food.    They keep prompting him to eat more slowly, to chew his food.      She denies any recent rumination of food.  No difficulty swallowing chewed food.    He has slowly gained wt over the past year.      He has been compliant taking his meds as below.        He does not speak words -- he only makes grunting sounds and non-verbal expressions.      Review of Systems   PSYCHIATRIC: Pertinant items are noted in the narrative.  CONSTITUTIONAL: Wt has been stable.   MUSCULOSKELETAL: No pain or stiffness of the joints.  NEUROLOGIC: No  weakness, sensory changes, seizures, tremor or other abnormal movements.  ENDOCRINE: No polydipsia or polyuria.  INTEGUMENTARY: no rash, no lacerations.  EYES: No exophthalmos, jaundice or blindness.  ENT: No dizziness, tinnitus or hearing loss.  RESPIRATORY: No shortness of breath.  CARDIOVASCULAR: No tachycardia or chest pain.  GASTROINTESTINAL: No nausea, vomiting, pain, constipation or diarrhea.  GENITOURINARY: No frequency, dysuria.         Current Outpatient Medications:     citalopram (CELEXA) 20 MG tablet, Take 1 tablet (20 mg total) by mouth once daily., Disp: 30 tablet, Rfl: 6    docusate sodium (COLACE) 100 MG capsule, Take 100 mg by mouth 2 (two) times daily., Disp: , Rfl:     ketoconazole (NIZORAL) 2 % shampoo, USE TO WASH HAIR TWICE A WEEK AS DIRECTED, Disp: 240 mL, Rfl: 4    lamoTRIgine (LAMICTAL) 100 MG tablet, TAKE 1 TAB BY MOUTH TWICE AT 8AM AND 8PM, Disp: 60 tablet, Rfl: 6    levothyroxine (SYNTHROID) 50 MCG tablet, TAKE 1 TAB BY MOUTH BEFORE BREAKFAST DAILY AT 8AM, Disp: 30 tablet, Rfl: 11    multivitamin capsule, Take 1 capsule by mouth once daily., Disp: 30 capsule, Rfl: 11    omega-3 fatty acids-vitamin E 1,000 mg Cap, One tablet maday dixon, Disp: 60 each, Rfl: 11    paliperidone (INVEGA) 3 MG TR24, TAKE 1 TAB BY MOUTH EVERY DAY, Disp: 30 tablet, Rfl: 6    pantoprazole (PROTONIX) 40 MG tablet, TAKE 1 TAB BY MOUTH ONCE DAILY AT 8AM, Disp: 30 tablet, Rfl: 11    polyethylene glycol (GLYCOLAX) 17 gram/dose powder, Take 17 g by mouth once daily. (Patient not taking: Reported on 7/1/2022), Disp: 595 g, Rfl: 3    propranoloL (INDERAL LA) 60 MG 24 hr capsule, TAKE 1 CAP BY MOUTH ONCE DAILY AT 8PM, Disp: 90 capsule, Rfl: 3    QUEtiapine (SEROQUEL) 100 MG Tab, Take oral one tablet BID at 7 am and 12 noon, Disp: 60 tablet, Rfl: 6    QUEtiapine (SEROQUEL) 25 MG Tab, Take 2 tablets (50 mg total) by mouth every 6 (six) hours as needed (agitation)., Disp: 120 tablet, Rfl: 6    QUEtiapine (SEROQUEL) 50 MG  tablet, Take 1 tablet (50 mg total) by mouth nightly., Disp: 30 tablet, Rfl: 6    senna (SENOKOT) 8.6 mg tablet, Take 2 tablets by mouth once daily., Disp: , Rfl:     triamcinolone acetonide 0.1% (KENALOG) 0.1 % cream, Apply topically 2 (two) times daily., Disp: 90 g, Rfl: 3    VITAMIN D3 1,000 unit tablet, TAKE 1 TABLET BY MOUTH EVERY DAY, Disp: 30 tablet, Rfl: 2    Compliance: Yes     Side effects: None     Risk Parameters:   Patient reports no suicidal ideation   Patient reports no homicidal ideation   Patient reports no self-injurious behavior   Patient reports no violent behavior     Patient's Response to Intervention:   The patient's response to intervention is accepting.     Progress Toward Goals and Other Mental Status Changes:   The patient's progress toward goals is limited.     Vitals: Most recent vital signs, dated today just prior to this appointment, were reviewed:     Vitals:    05/28/24 1611   BP: 104/64   Pulse: 85   Weight: 56.8 kg (125 lb 3.5 oz)        My Vitals       Weight Blood Pressure BMI Heartrate   9/3/2020 132 lb    84    9/4/2020 117 lb 1 oz  110/80  18.9  86    10/23/2020 115 lb 4.8 oz  129/80   80    11/19/2020 115 lb 8.3 oz  117/72  18.7  90    1/7/2021 120 lb 9.5 oz  110/77   74    7/20/2021 125 lb 3.5 oz  116/62  20.2  89    5/3/2022 131 lb   21.2     7/1/2022 134 lb 4.2 oz  100/60  21.7  78    12/21/2022 135 lb 9.3 oz  111/64   77    9/28/2023 126 lb 12.2 oz  92/60  20.5  71    5/28/2024 125 lb 3.5 oz  104/64   85        Labs:    No visits with results within 7 Month(s) from this visit.   Latest known visit with results is:   Lab Visit on 09/28/2023   Component Date Value Ref Range Status    WBC 09/28/2023 5.25  3.90 - 12.70 K/uL Final    RBC 09/28/2023 4.30 (L)  4.60 - 6.20 M/uL Final    Hemoglobin 09/28/2023 12.8 (L)  14.0 - 18.0 g/dL Final    Hematocrit 09/28/2023 39.4 (L)  40.0 - 54.0 % Final    MCV 09/28/2023 92  82 - 98 fL Final    MCH 09/28/2023 29.8  27.0 - 31.0 pg Final     MCHC 09/28/2023 32.5  32.0 - 36.0 g/dL Final    RDW 09/28/2023 12.1  11.5 - 14.5 % Final    Platelets 09/28/2023 242  150 - 450 K/uL Final    MPV 09/28/2023 10.8  9.2 - 12.9 fL Final    Immature Granulocytes 09/28/2023 0.2  0.0 - 0.5 % Final    Gran # (ANC) 09/28/2023 3.5  1.8 - 7.7 K/uL Final    Immature Grans (Abs) 09/28/2023 0.01  0.00 - 0.04 K/uL Final    Lymph # 09/28/2023 1.1  1.0 - 4.8 K/uL Final    Mono # 09/28/2023 0.4  0.3 - 1.0 K/uL Final    Eos # 09/28/2023 0.2  0.0 - 0.5 K/uL Final    Baso # 09/28/2023 0.04  0.00 - 0.20 K/uL Final    nRBC 09/28/2023 0  0 /100 WBC Final    Gran % 09/28/2023 67.3  38.0 - 73.0 % Final    Lymph % 09/28/2023 20.2  18.0 - 48.0 % Final    Mono % 09/28/2023 6.9  4.0 - 15.0 % Final    Eosinophil % 09/28/2023 4.6  0.0 - 8.0 % Final    Basophil % 09/28/2023 0.8  0.0 - 1.9 % Final    Differential Method 09/28/2023 Automated   Final    Sodium 09/28/2023 137  136 - 145 mmol/L Final    Potassium 09/28/2023 4.3  3.5 - 5.1 mmol/L Final    Chloride 09/28/2023 103  95 - 110 mmol/L Final    CO2 09/28/2023 28  23 - 29 mmol/L Final    Glucose 09/28/2023 90  70 - 110 mg/dL Final    BUN 09/28/2023 14  6 - 20 mg/dL Final    Creatinine 09/28/2023 1.0  0.5 - 1.4 mg/dL Final    Calcium 09/28/2023 9.7  8.7 - 10.5 mg/dL Final    Total Protein 09/28/2023 7.2  6.0 - 8.4 g/dL Final    Albumin 09/28/2023 4.1  3.5 - 5.2 g/dL Final    Total Bilirubin 09/28/2023 0.6  0.1 - 1.0 mg/dL Final    Alkaline Phosphatase 09/28/2023 79  55 - 135 U/L Final    AST 09/28/2023 17  10 - 40 U/L Final    ALT 09/28/2023 15  10 - 44 U/L Final    eGFR 09/28/2023 >60.0  >60 mL/min/1.73 m^2 Final    Anion Gap 09/28/2023 6 (L)  8 - 16 mmol/L Final    Cholesterol 09/28/2023 275 (H)  120 - 199 mg/dL Final    Triglycerides 09/28/2023 180 (H)  30 - 150 mg/dL Final    HDL 09/28/2023 35 (L)  40 - 75 mg/dL Final    LDL Cholesterol 09/28/2023 204.0 (H)  63.0 - 159.0 mg/dL Final    HDL/Cholesterol Ratio 09/28/2023 12.7 (L)  20.0 - 50.0 %  Final    Total Cholesterol/HDL Ratio 09/28/2023 7.9 (H)  2.0 - 5.0 Final    Non-HDL Cholesterol 09/28/2023 240  mg/dL Final    TSH 09/28/2023 1.715  0.400 - 4.000 uIU/mL Final    PSA, Screen 09/28/2023 0.50  0.00 - 4.00 ng/mL Final    Hemoglobin A1C 09/28/2023 4.7  4.0 - 5.6 % Final    Estimated Avg Glucose 09/28/2023 88  68 - 131 mg/dL Final         Mental Status Evaluation      Appearance:  casually dressed, disheveled   Behavior:  Not fully cooperative, impulsive/unpredictable, but mostly calm and redirectable, eye contact minimal, occasionally smiles   Speech:  no intelligible speech; sometimes makes noises: grunts, moans    Mood:  Unable to assess; patient cannot verbally communicate    Affect:  Euthymic to mildly irritible at times, blunted, constricted   Thought Process:  Profound poverty of thought    Thought Content:  Unable to assess; patient cannot verbally communicate    Sensorium:  Unable to assess; patient cannot verbally communicate   Attention Span & Concentration  Poor    Cognition:  severely impaired    Insight:  very poor    Judgment:  very poor      AIMS Screening:  Pt minimally cooperates with exam.  He has some blunting of facies, no appreciable tremor.  Gait is generally normal, deliberate, no shuffling observed.      Impression:   Intellectual Disability, profound  Impulse-Control Disorder, unspecified  Compulsive Skin Picking  Unspecified Psychosis -- for coding purposes for insurance coverage of pt's neuroleptic meds   Akathisia, improving (NOT CODED)   Seizure Disorder   Hx of Fatty liver with elevated liver enzymes (NOT CODED)    Plan:  Medication management:  Increase citalopram to 40 mg one tablet daily for anxiety and compulsive skin picking.  Caregivers may give this med at night if it causes drowsiness.    Increase Seroquel to 100 mg TID (8 am, noon and 8 pm).    Increase PRN Seroquel to 50 mg one tablet up to q 6 hrs prn agitation.   Continue Invega and Lamictal as above.            Additional Notes:  Follow up with Dr. Elaina Patterson for medical issues.     Return to Clinic: 6 months, or sooner prn.    LIMITED AIMS DONE TODAY.  NEXT AIMS DUE 11/'24.

## 2024-05-29 RX ORDER — FERROUS SULFATE 325(65) MG
TABLET ORAL
Qty: 30 TABLET | Refills: 0 | Status: SHIPPED | OUTPATIENT
Start: 2024-05-29

## 2024-05-29 NOTE — TELEPHONE ENCOUNTER
Refill Routing Note   Medication(s) are not appropriate for processing by [facility] for the following reason(s):        Outside of protocol: Ferrous Sulfate    [facility] action(s):  Route               Appointments  past 12m or future 3m with PCP    Date Provider   Last Visit   9/28/2023 Elaina Patterson MD   Next Visit   Visit date not found Elaina Patterson MD   ED visits in past 90 days: 0        Note composed:4:02 PM 05/29/2024

## 2024-06-10 ENCOUNTER — PATIENT MESSAGE (OUTPATIENT)
Dept: INTERNAL MEDICINE | Facility: CLINIC | Age: 54
End: 2024-06-10
Payer: MEDICARE

## 2024-06-19 ENCOUNTER — PATIENT MESSAGE (OUTPATIENT)
Dept: NEUROLOGY | Facility: CLINIC | Age: 54
End: 2024-06-19
Payer: MEDICARE

## 2024-06-25 RX ORDER — FERROUS SULFATE 325(65) MG
TABLET ORAL
Qty: 30 TABLET | Refills: 4 | Status: SHIPPED | OUTPATIENT
Start: 2024-06-25

## 2024-07-21 PROBLEM — F42.4 COMPULSIVE SKIN PICKING: Status: ACTIVE | Noted: 2024-07-21

## 2024-08-05 ENCOUNTER — OFFICE VISIT (OUTPATIENT)
Dept: INTERNAL MEDICINE | Facility: CLINIC | Age: 54
End: 2024-08-05
Payer: MEDICARE

## 2024-08-05 VITALS
HEIGHT: 66 IN | HEART RATE: 72 BPM | DIASTOLIC BLOOD PRESSURE: 60 MMHG | BODY MASS INDEX: 20.78 KG/M2 | SYSTOLIC BLOOD PRESSURE: 98 MMHG | WEIGHT: 129.31 LBS

## 2024-08-05 DIAGNOSIS — E78.49 OTHER HYPERLIPIDEMIA: Primary | ICD-10-CM

## 2024-08-05 DIAGNOSIS — F73 PROFOUND INTELLECTUAL DISABILITY: ICD-10-CM

## 2024-08-05 DIAGNOSIS — G40.909 SEIZURE DISORDER: ICD-10-CM

## 2024-08-05 DIAGNOSIS — E02 SUBCLINICAL IODINE-DEFICIENCY HYPOTHYROIDISM: ICD-10-CM

## 2024-08-05 DIAGNOSIS — E55.9 VITAMIN D DEFICIENCY DISEASE: ICD-10-CM

## 2024-08-05 DIAGNOSIS — R25.1 TREMOR: ICD-10-CM

## 2024-08-05 PROCEDURE — 99214 OFFICE O/P EST MOD 30 MIN: CPT | Mod: S$PBB,,, | Performed by: INTERNAL MEDICINE

## 2024-08-05 PROCEDURE — 99999 PR PBB SHADOW E&M-EST. PATIENT-LVL II: CPT | Mod: PBBFAC,,, | Performed by: INTERNAL MEDICINE

## 2024-08-05 PROCEDURE — 99212 OFFICE O/P EST SF 10 MIN: CPT | Mod: PBBFAC | Performed by: INTERNAL MEDICINE

## 2024-08-05 RX ORDER — ACETAMINOPHEN 500 MG
TABLET ORAL
Qty: 60 CAPSULE | Refills: 11 | Status: SHIPPED | OUTPATIENT
Start: 2024-08-05

## 2024-08-05 RX ORDER — PROPRANOLOL HYDROCHLORIDE 60 MG/1
CAPSULE, EXTENDED RELEASE ORAL
Qty: 30 CAPSULE | Refills: 11 | Status: SHIPPED | OUTPATIENT
Start: 2024-08-05

## 2024-08-05 RX ORDER — SENNOSIDES 8.6 MG/1
2 TABLET ORAL DAILY
Qty: 60 TABLET | Refills: 11 | Status: SHIPPED | OUTPATIENT
Start: 2024-08-05

## 2024-08-05 RX ORDER — DOCUSATE SODIUM 100 MG/1
100 CAPSULE, LIQUID FILLED ORAL 2 TIMES DAILY
Qty: 60 CAPSULE | Refills: 11 | Status: SHIPPED | OUTPATIENT
Start: 2024-08-05

## 2024-08-05 RX ORDER — LEVOTHYROXINE SODIUM 50 UG/1
50 TABLET ORAL
Qty: 30 TABLET | Refills: 11 | Status: SHIPPED | OUTPATIENT
Start: 2024-08-05

## 2024-08-05 RX ORDER — TRIAMCINOLONE ACETONIDE 1 MG/G
CREAM TOPICAL 2 TIMES DAILY
Qty: 90 G | Refills: 3 | Status: SHIPPED | OUTPATIENT
Start: 2024-08-05

## 2024-08-05 RX ORDER — CHOLECALCIFEROL (VITAMIN D3) 25 MCG
1000 TABLET ORAL DAILY
Qty: 30 TABLET | Refills: 11 | Status: SHIPPED | OUTPATIENT
Start: 2024-08-05

## 2024-08-05 RX ORDER — PANTOPRAZOLE SODIUM 40 MG/1
40 TABLET, DELAYED RELEASE ORAL DAILY
Qty: 30 TABLET | Refills: 11 | Status: SHIPPED | OUTPATIENT
Start: 2024-08-05

## 2025-01-13 DIAGNOSIS — Z00.00 ENCOUNTER FOR MEDICARE ANNUAL WELLNESS EXAM: ICD-10-CM

## 2025-03-19 ENCOUNTER — PATIENT MESSAGE (OUTPATIENT)
Dept: PSYCHIATRY | Facility: CLINIC | Age: 55
End: 2025-03-19
Payer: MEDICARE

## 2025-07-17 ENCOUNTER — PATIENT MESSAGE (OUTPATIENT)
Dept: INTERNAL MEDICINE | Facility: CLINIC | Age: 55
End: 2025-07-17
Payer: MEDICARE

## 2025-08-12 ENCOUNTER — PATIENT MESSAGE (OUTPATIENT)
Dept: PSYCHIATRY | Facility: CLINIC | Age: 55
End: 2025-08-12
Payer: MEDICARE

## 2025-08-12 DIAGNOSIS — R45.1 AGITATION: ICD-10-CM

## 2025-08-12 DIAGNOSIS — F42.4 COMPULSIVE SKIN PICKING: ICD-10-CM

## 2025-08-12 DIAGNOSIS — R25.1 TREMOR: ICD-10-CM

## 2025-08-12 DIAGNOSIS — F63.9 IMPULSE CONTROL DISORDER: ICD-10-CM

## 2025-08-12 DIAGNOSIS — F29 PSYCHOSIS, UNSPECIFIED PSYCHOSIS TYPE: ICD-10-CM

## 2025-08-12 DIAGNOSIS — F73 PROFOUND INTELLECTUAL DISABILITY: ICD-10-CM

## 2025-08-12 RX ORDER — PANTOPRAZOLE SODIUM 40 MG/1
40 TABLET, DELAYED RELEASE ORAL DAILY
Qty: 90 TABLET | Refills: 0 | Status: CANCELLED | OUTPATIENT
Start: 2025-08-12

## 2025-08-12 RX ORDER — PANTOPRAZOLE SODIUM 40 MG/1
40 TABLET, DELAYED RELEASE ORAL DAILY
Qty: 90 TABLET | Refills: 0 | Status: SHIPPED | OUTPATIENT
Start: 2025-08-12

## 2025-08-13 RX ORDER — LEVOTHYROXINE SODIUM 50 UG/1
50 TABLET ORAL
Qty: 30 TABLET | Refills: 11 | Status: SHIPPED | OUTPATIENT
Start: 2025-08-13

## 2025-08-13 RX ORDER — TRIAMCINOLONE ACETONIDE 1 MG/G
CREAM TOPICAL 2 TIMES DAILY
Qty: 90 G | Refills: 3 | Status: SHIPPED | OUTPATIENT
Start: 2025-08-13

## 2025-08-13 RX ORDER — ACETAMINOPHEN 500 MG
TABLET ORAL
Qty: 60 CAPSULE | Refills: 11 | Status: SHIPPED | OUTPATIENT
Start: 2025-08-13

## 2025-08-13 RX ORDER — KETOCONAZOLE 20 MG/ML
SHAMPOO, SUSPENSION TOPICAL
Qty: 240 ML | Refills: 4 | Status: SHIPPED | OUTPATIENT
Start: 2025-08-13

## 2025-08-13 RX ORDER — PALIPERIDONE 3 MG/1
TABLET, EXTENDED RELEASE ORAL
Qty: 30 TABLET | Refills: 2 | Status: SHIPPED | OUTPATIENT
Start: 2025-08-13

## 2025-08-13 RX ORDER — POLYETHYLENE GLYCOL 3350 17 G/17G
17 POWDER, FOR SOLUTION ORAL DAILY
Qty: 595 G | Refills: 3 | Status: SHIPPED | OUTPATIENT
Start: 2025-08-13

## 2025-08-13 RX ORDER — CHOLECALCIFEROL (VITAMIN D3) 25 MCG
1000 TABLET ORAL DAILY
Qty: 30 TABLET | Refills: 11 | Status: SHIPPED | OUTPATIENT
Start: 2025-08-13

## 2025-08-13 RX ORDER — DOCUSATE SODIUM 100 MG/1
CAPSULE, LIQUID FILLED ORAL
Qty: 180 CAPSULE | Refills: 0 | Status: SHIPPED | OUTPATIENT
Start: 2025-08-13

## 2025-08-13 RX ORDER — LAMOTRIGINE 100 MG/1
TABLET ORAL
Qty: 60 TABLET | Refills: 2 | Status: SHIPPED | OUTPATIENT
Start: 2025-08-13

## 2025-08-13 RX ORDER — DOCUSATE SODIUM 100 MG/1
100 CAPSULE, LIQUID FILLED ORAL 2 TIMES DAILY
Qty: 60 CAPSULE | Refills: 11 | Status: SHIPPED | OUTPATIENT
Start: 2025-08-13

## 2025-08-13 RX ORDER — PROPRANOLOL HYDROCHLORIDE 60 MG/1
CAPSULE, EXTENDED RELEASE ORAL
Qty: 30 CAPSULE | Refills: 11 | Status: SHIPPED | OUTPATIENT
Start: 2025-08-13

## 2025-08-13 RX ORDER — QUETIAPINE FUMARATE 100 MG/1
100 TABLET, FILM COATED ORAL 3 TIMES DAILY
Qty: 90 TABLET | Refills: 2 | Status: SHIPPED | OUTPATIENT
Start: 2025-08-13

## 2025-08-13 RX ORDER — PROPRANOLOL HYDROCHLORIDE 60 MG/1
CAPSULE, EXTENDED RELEASE ORAL
Qty: 90 CAPSULE | Refills: 0 | Status: SHIPPED | OUTPATIENT
Start: 2025-08-13

## 2025-08-13 RX ORDER — CITALOPRAM 40 MG/1
40 TABLET ORAL DAILY
Qty: 30 TABLET | Refills: 2 | Status: SHIPPED | OUTPATIENT
Start: 2025-08-13

## 2025-08-13 RX ORDER — ACETAMINOPHEN 500 MG
TABLET ORAL
Qty: 180 CAPSULE | Refills: 0 | Status: SHIPPED | OUTPATIENT
Start: 2025-08-13

## 2025-08-13 RX ORDER — LEVOTHYROXINE SODIUM 50 UG/1
50 TABLET ORAL
Qty: 90 TABLET | Refills: 0 | Status: SHIPPED | OUTPATIENT
Start: 2025-08-13

## 2025-08-13 RX ORDER — SENNOSIDES 8.6 MG/1
2 TABLET ORAL DAILY
Qty: 60 TABLET | Refills: 11 | Status: SHIPPED | OUTPATIENT
Start: 2025-08-13

## 2025-08-13 RX ORDER — FERROUS SULFATE 325(65) MG
325 TABLET ORAL DAILY
Qty: 30 TABLET | Refills: 4 | Status: SHIPPED | OUTPATIENT
Start: 2025-08-13

## 2025-08-13 RX ORDER — QUETIAPINE FUMARATE 50 MG/1
50 TABLET, FILM COATED ORAL EVERY 6 HOURS PRN
Qty: 60 TABLET | Refills: 2 | Status: SHIPPED | OUTPATIENT
Start: 2025-08-13

## 2025-08-13 RX ORDER — CHOLECALCIFEROL (VITAMIN D3) 25 MCG
1000 TABLET ORAL
Qty: 90 TABLET | Refills: 0 | Status: SHIPPED | OUTPATIENT
Start: 2025-08-13

## 2025-08-18 ENCOUNTER — OFFICE VISIT (OUTPATIENT)
Dept: INTERNAL MEDICINE | Facility: CLINIC | Age: 55
End: 2025-08-18
Payer: MEDICARE

## 2025-08-18 ENCOUNTER — LAB VISIT (OUTPATIENT)
Dept: LAB | Facility: HOSPITAL | Age: 55
End: 2025-08-18
Attending: INTERNAL MEDICINE
Payer: MEDICARE

## 2025-08-18 VITALS
DIASTOLIC BLOOD PRESSURE: 60 MMHG | BODY MASS INDEX: 19.13 KG/M2 | HEIGHT: 66 IN | HEART RATE: 62 BPM | SYSTOLIC BLOOD PRESSURE: 100 MMHG | WEIGHT: 119.06 LBS

## 2025-08-18 DIAGNOSIS — E55.9 VITAMIN D DEFICIENCY DISEASE: ICD-10-CM

## 2025-08-18 DIAGNOSIS — Z12.5 SCREENING PSA (PROSTATE SPECIFIC ANTIGEN): ICD-10-CM

## 2025-08-18 DIAGNOSIS — E03.9 HYPOTHYROIDISM, UNSPECIFIED TYPE: ICD-10-CM

## 2025-08-18 DIAGNOSIS — E78.49 OTHER HYPERLIPIDEMIA: ICD-10-CM

## 2025-08-18 DIAGNOSIS — R79.9 ABNORMAL BLOOD CHEMISTRY: ICD-10-CM

## 2025-08-18 DIAGNOSIS — E53.8 VITAMIN B12 DEFICIENCY: ICD-10-CM

## 2025-08-18 DIAGNOSIS — G40.909 SEIZURE DISORDER: ICD-10-CM

## 2025-08-18 DIAGNOSIS — Z00.00 ROUTINE GENERAL MEDICAL EXAMINATION AT A HEALTH CARE FACILITY: Primary | ICD-10-CM

## 2025-08-18 DIAGNOSIS — F73 PROFOUND INTELLECTUAL DISABILITY: ICD-10-CM

## 2025-08-18 DIAGNOSIS — F29 PSYCHOSIS, UNSPECIFIED PSYCHOSIS TYPE: ICD-10-CM

## 2025-08-18 LAB
ABSOLUTE EOSINOPHIL (OHS): 0.27 K/UL
ABSOLUTE MONOCYTE (OHS): 0.44 K/UL (ref 0.3–1)
ABSOLUTE NEUTROPHIL COUNT (OHS): 2.68 K/UL (ref 1.8–7.7)
ALBUMIN SERPL BCP-MCNC: 3.9 G/DL (ref 3.5–5.2)
ALP SERPL-CCNC: 65 UNIT/L (ref 40–150)
ALT SERPL W/O P-5'-P-CCNC: 14 UNIT/L (ref 0–55)
ANION GAP (OHS): 7 MMOL/L (ref 8–16)
AST SERPL-CCNC: 29 UNIT/L (ref 0–50)
BASOPHILS # BLD AUTO: 0.05 K/UL
BASOPHILS NFR BLD AUTO: 1 %
BILIRUB SERPL-MCNC: 0.5 MG/DL (ref 0.1–1)
BUN SERPL-MCNC: 18 MG/DL (ref 6–20)
CALCIUM SERPL-MCNC: 9 MG/DL (ref 8.7–10.5)
CHLORIDE SERPL-SCNC: 105 MMOL/L (ref 95–110)
CHOLEST SERPL-MCNC: 207 MG/DL (ref 120–199)
CHOLEST/HDLC SERPL: 5.4 {RATIO} (ref 2–5)
CO2 SERPL-SCNC: 29 MMOL/L (ref 23–29)
CREAT SERPL-MCNC: 0.8 MG/DL (ref 0.5–1.4)
EAG (OHS): 94 MG/DL (ref 68–131)
ERYTHROCYTE [DISTWIDTH] IN BLOOD BY AUTOMATED COUNT: 11.7 % (ref 11.5–14.5)
GFR SERPLBLD CREATININE-BSD FMLA CKD-EPI: >60 ML/MIN/1.73/M2
GLUCOSE SERPL-MCNC: 61 MG/DL (ref 70–110)
HBA1C MFR BLD: 4.9 % (ref 4–5.6)
HCT VFR BLD AUTO: 35.8 % (ref 40–54)
HDLC SERPL-MCNC: 38 MG/DL (ref 40–75)
HDLC SERPL: 18.4 % (ref 20–50)
HGB BLD-MCNC: 11.4 GM/DL (ref 14–18)
IMM GRANULOCYTES # BLD AUTO: 0.01 K/UL (ref 0–0.04)
IMM GRANULOCYTES NFR BLD AUTO: 0.2 % (ref 0–0.5)
LDLC SERPL CALC-MCNC: 152.8 MG/DL (ref 63–159)
LYMPHOCYTES # BLD AUTO: 1.46 K/UL (ref 1–4.8)
MCH RBC QN AUTO: 30.1 PG (ref 27–31)
MCHC RBC AUTO-ENTMCNC: 31.8 G/DL (ref 32–36)
MCV RBC AUTO: 95 FL (ref 82–98)
NONHDLC SERPL-MCNC: 169 MG/DL
NUCLEATED RBC (/100WBC) (OHS): 0 /100 WBC
PLATELET # BLD AUTO: 268 K/UL (ref 150–450)
PMV BLD AUTO: 10.5 FL (ref 9.2–12.9)
POTASSIUM SERPL-SCNC: 4.2 MMOL/L (ref 3.5–5.1)
PROT SERPL-MCNC: 6.8 GM/DL (ref 6–8.4)
RBC # BLD AUTO: 3.79 M/UL (ref 4.6–6.2)
RELATIVE EOSINOPHIL (OHS): 5.5 %
RELATIVE LYMPHOCYTE (OHS): 29.7 % (ref 18–48)
RELATIVE MONOCYTE (OHS): 9 % (ref 4–15)
RELATIVE NEUTROPHIL (OHS): 54.6 % (ref 38–73)
SODIUM SERPL-SCNC: 141 MMOL/L (ref 136–145)
TRIGL SERPL-MCNC: 81 MG/DL (ref 30–150)
WBC # BLD AUTO: 4.91 K/UL (ref 3.9–12.7)

## 2025-08-18 PROCEDURE — 85025 COMPLETE CBC W/AUTO DIFF WBC: CPT

## 2025-08-18 PROCEDURE — 82306 VITAMIN D 25 HYDROXY: CPT

## 2025-08-18 PROCEDURE — 99999 PR PBB SHADOW E&M-EST. PATIENT-LVL III: CPT | Mod: PBBFAC,,, | Performed by: INTERNAL MEDICINE

## 2025-08-18 PROCEDURE — 82607 VITAMIN B-12: CPT

## 2025-08-18 PROCEDURE — 83036 HEMOGLOBIN GLYCOSYLATED A1C: CPT

## 2025-08-18 PROCEDURE — 99213 OFFICE O/P EST LOW 20 MIN: CPT | Mod: PBBFAC | Performed by: INTERNAL MEDICINE

## 2025-08-18 PROCEDURE — 80061 LIPID PANEL: CPT

## 2025-08-18 PROCEDURE — 36415 COLL VENOUS BLD VENIPUNCTURE: CPT

## 2025-08-18 PROCEDURE — 99396 PREV VISIT EST AGE 40-64: CPT | Mod: S$PBB,GZ,, | Performed by: INTERNAL MEDICINE

## 2025-08-18 PROCEDURE — 82040 ASSAY OF SERUM ALBUMIN: CPT

## 2025-08-18 PROCEDURE — 84153 ASSAY OF PSA TOTAL: CPT

## 2025-08-18 PROCEDURE — 84443 ASSAY THYROID STIM HORMONE: CPT

## 2025-08-18 PROCEDURE — 99213 OFFICE O/P EST LOW 20 MIN: CPT | Mod: S$PBB,25,, | Performed by: INTERNAL MEDICINE

## 2025-08-19 LAB
25(OH)D3+25(OH)D2 SERPL-MCNC: 53 NG/ML (ref 30–96)
PSA SERPL-MCNC: 0.48 NG/ML
TSH SERPL-ACNC: 1.22 UIU/ML (ref 0.4–4)
VIT B12 SERPL-MCNC: 1411 PG/ML (ref 210–950)

## 2025-08-26 DIAGNOSIS — R25.1 TREMOR: ICD-10-CM

## 2025-08-26 RX ORDER — PROPRANOLOL HYDROCHLORIDE 60 MG/1
CAPSULE, EXTENDED RELEASE ORAL
Qty: 90 CAPSULE | Refills: 3 | Status: SHIPPED | OUTPATIENT
Start: 2025-08-26

## 2025-08-26 RX ORDER — CHOLECALCIFEROL (VITAMIN D3) 25 MCG
1000 TABLET ORAL
Qty: 90 TABLET | Refills: 11 | Status: SHIPPED | OUTPATIENT
Start: 2025-08-26

## 2025-08-26 RX ORDER — LEVOTHYROXINE SODIUM 50 UG/1
50 TABLET ORAL
Qty: 90 TABLET | Refills: 3 | Status: SHIPPED | OUTPATIENT
Start: 2025-08-26

## 2025-08-26 RX ORDER — PANTOPRAZOLE SODIUM 40 MG/1
40 TABLET, DELAYED RELEASE ORAL DAILY
Qty: 90 TABLET | Refills: 3 | Status: SHIPPED | OUTPATIENT
Start: 2025-08-26

## 2025-08-31 ENCOUNTER — RESULTS FOLLOW-UP (OUTPATIENT)
Dept: INTERNAL MEDICINE | Facility: CLINIC | Age: 55
End: 2025-08-31
Payer: MEDICARE

## 2025-08-31 DIAGNOSIS — D64.9 ANEMIA, UNSPECIFIED TYPE: Primary | ICD-10-CM

## 2025-09-02 ENCOUNTER — TELEPHONE (OUTPATIENT)
Dept: INTERNAL MEDICINE | Facility: CLINIC | Age: 55
End: 2025-09-02
Payer: MEDICARE